# Patient Record
Sex: FEMALE | Race: WHITE | Employment: OTHER | URBAN - METROPOLITAN AREA
[De-identification: names, ages, dates, MRNs, and addresses within clinical notes are randomized per-mention and may not be internally consistent; named-entity substitution may affect disease eponyms.]

---

## 2017-01-03 ENCOUNTER — HOSPITAL ENCOUNTER (OUTPATIENT)
Dept: MAMMOGRAPHY | Age: 77
Discharge: HOME | End: 2017-01-03
Attending: INTERNAL MEDICINE | Admitting: INTERNAL MEDICINE

## 2017-01-03 DIAGNOSIS — R92.8 ABNORMAL MAMMOGRAM, UNSPECIFIED: ICD-10-CM

## 2017-01-03 DIAGNOSIS — Z92.81 PERSONAL HISTORY OF EXTRACORPOREAL MEMBRANE OXYGENATION: ICD-10-CM

## 2017-01-03 DIAGNOSIS — R92.8 ABNORMAL MAMMOGRAM OF LEFT BREAST: ICD-10-CM

## 2017-01-09 RX ORDER — TRAMADOL HYDROCHLORIDE 50 MG/1
TABLET ORAL
Qty: 360 TABLET | Refills: 0 | Status: SHIPPED | OUTPATIENT
Start: 2017-01-09 | End: 2017-04-21 | Stop reason: SDUPTHER

## 2017-02-06 ENCOUNTER — OFFICE VISIT (OUTPATIENT)
Dept: INTERNAL MEDICINE CLINIC | Age: 77
End: 2017-02-06

## 2017-02-06 VITALS — DIASTOLIC BLOOD PRESSURE: 84 MMHG | WEIGHT: 184 LBS | SYSTOLIC BLOOD PRESSURE: 142 MMHG | BODY MASS INDEX: 31.58 KG/M2

## 2017-02-06 DIAGNOSIS — M79.672 PAIN OF LEFT HEEL: Primary | ICD-10-CM

## 2017-02-06 DIAGNOSIS — M77.32 HEEL SPUR, LEFT: ICD-10-CM

## 2017-02-06 DIAGNOSIS — M72.2 PLANTAR FASCIITIS OF LEFT FOOT: ICD-10-CM

## 2017-02-06 PROCEDURE — 99213 OFFICE O/P EST LOW 20 MIN: CPT | Performed by: INTERNAL MEDICINE

## 2017-02-23 ENCOUNTER — PATIENT MESSAGE (OUTPATIENT)
Dept: INTERNAL MEDICINE CLINIC | Age: 77
End: 2017-02-23

## 2017-02-23 DIAGNOSIS — N28.9 RENAL INSUFFICIENCY: ICD-10-CM

## 2017-02-23 DIAGNOSIS — I25.10 CAD IN NATIVE ARTERY: ICD-10-CM

## 2017-02-23 DIAGNOSIS — I10 BENIGN ESSENTIAL HTN: Primary | ICD-10-CM

## 2017-02-23 DIAGNOSIS — E78.5 DYSLIPIDEMIA: ICD-10-CM

## 2017-02-24 ENCOUNTER — HOSPITAL ENCOUNTER (OUTPATIENT)
Dept: OTHER | Age: 77
Discharge: HOME | End: 2017-02-24
Attending: INTERNAL MEDICINE | Admitting: INTERNAL MEDICINE

## 2017-02-24 DIAGNOSIS — N28.9 RENAL INSUFFICIENCY: ICD-10-CM

## 2017-02-24 DIAGNOSIS — I10 BENIGN ESSENTIAL HTN: ICD-10-CM

## 2017-02-24 DIAGNOSIS — E78.5 DYSLIPIDEMIA: ICD-10-CM

## 2017-02-24 DIAGNOSIS — I25.10 CAD IN NATIVE ARTERY: ICD-10-CM

## 2017-02-24 LAB
A/G RATIO: 1.2 (ref 1.1–2.2)
ALBUMIN SERPL-MCNC: 4 G/DL (ref 3.4–5)
ALP BLD-CCNC: 59 U/L (ref 40–129)
ALT SERPL-CCNC: 18 U/L (ref 10–40)
ANION GAP SERPL CALCULATED.3IONS-SCNC: 15 MMOL/L (ref 3–16)
AST SERPL-CCNC: 25 U/L (ref 15–37)
BASOPHILS ABSOLUTE: 0.1 K/UL (ref 0–0.2)
BASOPHILS RELATIVE PERCENT: 1.2 %
BILIRUB SERPL-MCNC: 0.5 MG/DL (ref 0–1)
BUN BLDV-MCNC: 24 MG/DL (ref 7–20)
CALCIUM SERPL-MCNC: 9.8 MG/DL (ref 8.3–10.6)
CHLORIDE BLD-SCNC: 98 MMOL/L (ref 99–110)
CHOLESTEROL, TOTAL: 257 MG/DL (ref 0–199)
CO2: 29 MMOL/L (ref 21–32)
CREAT SERPL-MCNC: 1.1 MG/DL (ref 0.6–1.2)
EOSINOPHILS ABSOLUTE: 0.2 K/UL (ref 0–0.6)
EOSINOPHILS RELATIVE PERCENT: 3 %
GFR AFRICAN AMERICAN: 58
GFR NON-AFRICAN AMERICAN: 48
GLOBULIN: 3.3 G/DL
GLUCOSE BLD-MCNC: 93 MG/DL (ref 70–99)
HCT VFR BLD CALC: 49.3 % (ref 36–48)
HDLC SERPL-MCNC: 50 MG/DL (ref 40–60)
HEMOGLOBIN: 15.9 G/DL (ref 12–16)
LDL CHOLESTEROL CALCULATED: 168 MG/DL
LYMPHOCYTES ABSOLUTE: 1.5 K/UL (ref 1–5.1)
LYMPHOCYTES RELATIVE PERCENT: 25.7 %
MCH RBC QN AUTO: 32 PG (ref 26–34)
MCHC RBC AUTO-ENTMCNC: 32.2 G/DL (ref 31–36)
MCV RBC AUTO: 99.2 FL (ref 80–100)
MONOCYTES ABSOLUTE: 0.4 K/UL (ref 0–1.3)
MONOCYTES RELATIVE PERCENT: 6.6 %
NEUTROPHILS ABSOLUTE: 3.7 K/UL (ref 1.7–7.7)
NEUTROPHILS RELATIVE PERCENT: 63.5 %
PDW BLD-RTO: 14 % (ref 12.4–15.4)
PLATELET # BLD: 136 K/UL (ref 135–450)
PMV BLD AUTO: 10.7 FL (ref 5–10.5)
POTASSIUM SERPL-SCNC: 4.3 MMOL/L (ref 3.5–5.1)
RBC # BLD: 4.97 M/UL (ref 4–5.2)
SODIUM BLD-SCNC: 142 MMOL/L (ref 136–145)
TOTAL PROTEIN: 7.3 G/DL (ref 6.4–8.2)
TRIGL SERPL-MCNC: 197 MG/DL (ref 0–150)
TSH SERPL DL<=0.05 MIU/L-ACNC: 2.22 UIU/ML (ref 0.27–4.2)
VLDLC SERPL CALC-MCNC: 39 MG/DL
WBC # BLD: 5.9 K/UL (ref 4–11)

## 2017-03-02 ENCOUNTER — OFFICE VISIT (OUTPATIENT)
Dept: INTERNAL MEDICINE CLINIC | Age: 77
End: 2017-03-02

## 2017-03-02 VITALS
WEIGHT: 178 LBS | RESPIRATION RATE: 12 BRPM | SYSTOLIC BLOOD PRESSURE: 138 MMHG | HEIGHT: 64 IN | OXYGEN SATURATION: 94 % | HEART RATE: 70 BPM | BODY MASS INDEX: 30.39 KG/M2 | DIASTOLIC BLOOD PRESSURE: 69 MMHG

## 2017-03-02 DIAGNOSIS — E78.5 DYSLIPIDEMIA: ICD-10-CM

## 2017-03-02 DIAGNOSIS — M85.80 OSTEOPENIA: ICD-10-CM

## 2017-03-02 DIAGNOSIS — I10 BENIGN ESSENTIAL HTN: Primary | ICD-10-CM

## 2017-03-02 DIAGNOSIS — Z78.9 STATIN INTOLERANCE: ICD-10-CM

## 2017-03-02 DIAGNOSIS — N18.30 CKD (CHRONIC KIDNEY DISEASE) STAGE 3, GFR 30-59 ML/MIN (HCC): ICD-10-CM

## 2017-03-02 DIAGNOSIS — I25.10 CAD IN NATIVE ARTERY: ICD-10-CM

## 2017-03-02 LAB — PARATHYROID HORMONE INTACT: 39 PG/ML (ref 14–72)

## 2017-03-02 PROCEDURE — 99214 OFFICE O/P EST MOD 30 MIN: CPT | Performed by: INTERNAL MEDICINE

## 2017-03-03 LAB
ALBUMIN SERPL-MCNC: 3.5 G/DL (ref 3.1–4.9)
ALPHA-1-GLOBULIN: 0.3 G/DL (ref 0.2–0.4)
ALPHA-2-GLOBULIN: 0.9 G/DL (ref 0.4–1.1)
BETA GLOBULIN: 1.6 G/DL (ref 0.9–1.6)
GAMMA GLOBULIN: 0.9 G/DL (ref 0.6–1.8)
SPE/IFE INTERPRETATION: NORMAL
TOTAL PROTEIN: 7.1 G/DL (ref 6.4–8.2)

## 2017-03-06 LAB
24HR URINE VOLUME (ML): 1500 ML
PROTEIN 24 HOUR URINE: 0.06 G/24HR

## 2017-03-07 LAB
CREAT SERPL-MCNC: 1.3 MG/DL (ref 0.6–1.2)
CREATININE 24 HOUR URINE: 0.6 G/24HR (ref 0.6–1.5)
CREATININE CLEARANCE: 35 ML/MIN (ref 88–128)
Lab: 24 HOURS
Lab: 24 HR

## 2017-03-08 LAB — URINE ELECTROPHORESIS INTERP: NORMAL

## 2017-03-09 ENCOUNTER — HOSPITAL ENCOUNTER (OUTPATIENT)
Dept: ULTRASOUND IMAGING | Age: 77
Discharge: HOME | End: 2017-03-09
Attending: INTERNAL MEDICINE | Admitting: INTERNAL MEDICINE

## 2017-03-09 DIAGNOSIS — N18.30 CHRONIC KIDNEY DISEASE, STAGE III (MODERATE) (HCC): ICD-10-CM

## 2017-03-09 DIAGNOSIS — N18.30 CKD (CHRONIC KIDNEY DISEASE) STAGE 3, GFR 30-59 ML/MIN (HCC): ICD-10-CM

## 2017-03-10 ENCOUNTER — PATIENT MESSAGE (OUTPATIENT)
Dept: INTERNAL MEDICINE CLINIC | Age: 77
End: 2017-03-10

## 2017-03-10 DIAGNOSIS — N18.30 CKD (CHRONIC KIDNEY DISEASE) STAGE 3, GFR 30-59 ML/MIN (HCC): Primary | ICD-10-CM

## 2017-04-21 RX ORDER — TRAMADOL HYDROCHLORIDE 50 MG/1
TABLET ORAL
Qty: 360 TABLET | Refills: 0 | Status: SHIPPED | OUTPATIENT
Start: 2017-04-21 | End: 2017-07-18 | Stop reason: SDUPTHER

## 2017-06-27 ENCOUNTER — PATIENT MESSAGE (OUTPATIENT)
Dept: INTERNAL MEDICINE CLINIC | Age: 77
End: 2017-06-27

## 2017-06-27 DIAGNOSIS — I25.10 CAD IN NATIVE ARTERY: Primary | ICD-10-CM

## 2017-06-27 DIAGNOSIS — E78.5 DYSLIPIDEMIA: ICD-10-CM

## 2017-06-27 DIAGNOSIS — N18.30 CKD (CHRONIC KIDNEY DISEASE), STAGE III (HCC): ICD-10-CM

## 2017-06-28 ENCOUNTER — HOSPITAL ENCOUNTER (OUTPATIENT)
Dept: OTHER | Age: 77
Discharge: HOME | End: 2017-06-28
Attending: INTERNAL MEDICINE | Admitting: INTERNAL MEDICINE

## 2017-06-28 DIAGNOSIS — N18.30 CKD (CHRONIC KIDNEY DISEASE), STAGE III (HCC): ICD-10-CM

## 2017-06-28 DIAGNOSIS — I25.10 CAD IN NATIVE ARTERY: ICD-10-CM

## 2017-06-28 DIAGNOSIS — E78.5 DYSLIPIDEMIA: ICD-10-CM

## 2017-06-28 LAB
A/G RATIO: 1.3 (ref 1.1–2.2)
ALBUMIN SERPL-MCNC: 4.2 G/DL (ref 3.4–5)
ALP BLD-CCNC: 58 U/L (ref 40–129)
ALT SERPL-CCNC: 19 U/L (ref 10–40)
ANION GAP SERPL CALCULATED.3IONS-SCNC: 11 MMOL/L (ref 3–16)
AST SERPL-CCNC: 25 U/L (ref 15–37)
BASOPHILS ABSOLUTE: 0 K/UL (ref 0–0.2)
BASOPHILS RELATIVE PERCENT: 0.7 %
BILIRUB SERPL-MCNC: 0.5 MG/DL (ref 0–1)
BUN BLDV-MCNC: 24 MG/DL (ref 7–20)
CALCIUM SERPL-MCNC: 10 MG/DL (ref 8.3–10.6)
CHLORIDE BLD-SCNC: 99 MMOL/L (ref 99–110)
CHOLESTEROL, TOTAL: 254 MG/DL (ref 0–199)
CO2: 32 MMOL/L (ref 21–32)
CREAT SERPL-MCNC: 1.1 MG/DL (ref 0.6–1.2)
EOSINOPHILS ABSOLUTE: 0.2 K/UL (ref 0–0.6)
EOSINOPHILS RELATIVE PERCENT: 3.6 %
GFR AFRICAN AMERICAN: 58
GFR NON-AFRICAN AMERICAN: 48
GLOBULIN: 3.3 G/DL
GLUCOSE BLD-MCNC: 87 MG/DL (ref 70–99)
HCT VFR BLD CALC: 49 % (ref 36–48)
HDLC SERPL-MCNC: 56 MG/DL (ref 40–60)
HEMOGLOBIN: 16.4 G/DL (ref 12–16)
LDL CHOLESTEROL CALCULATED: 159 MG/DL
LYMPHOCYTES ABSOLUTE: 1.6 K/UL (ref 1–5.1)
LYMPHOCYTES RELATIVE PERCENT: 32 %
MCH RBC QN AUTO: 33.2 PG (ref 26–34)
MCHC RBC AUTO-ENTMCNC: 33.5 G/DL (ref 31–36)
MCV RBC AUTO: 99.1 FL (ref 80–100)
MONOCYTES ABSOLUTE: 0.4 K/UL (ref 0–1.3)
MONOCYTES RELATIVE PERCENT: 8.2 %
NEUTROPHILS ABSOLUTE: 2.7 K/UL (ref 1.7–7.7)
NEUTROPHILS RELATIVE PERCENT: 55.5 %
PARATHYROID HORMONE INTACT: 45.7 PG/ML (ref 14–72)
PDW BLD-RTO: 13.9 % (ref 12.4–15.4)
PLATELET # BLD: 150 K/UL (ref 135–450)
PMV BLD AUTO: 10.2 FL (ref 5–10.5)
POTASSIUM SERPL-SCNC: 4.7 MMOL/L (ref 3.5–5.1)
RBC # BLD: 4.94 M/UL (ref 4–5.2)
SODIUM BLD-SCNC: 142 MMOL/L (ref 136–145)
TOTAL PROTEIN: 7.5 G/DL (ref 6.4–8.2)
TRIGL SERPL-MCNC: 197 MG/DL (ref 0–150)
VLDLC SERPL CALC-MCNC: 39 MG/DL
WBC # BLD: 4.8 K/UL (ref 4–11)

## 2017-07-03 ENCOUNTER — PATIENT MESSAGE (OUTPATIENT)
Dept: INTERNAL MEDICINE CLINIC | Age: 77
End: 2017-07-03

## 2017-07-03 DIAGNOSIS — R92.8 ABNORMAL MAMMOGRAM: Primary | ICD-10-CM

## 2017-07-13 ENCOUNTER — HOSPITAL ENCOUNTER (OUTPATIENT)
Dept: MAMMOGRAPHY | Age: 77
Discharge: HOME | End: 2017-07-13
Attending: INTERNAL MEDICINE | Admitting: INTERNAL MEDICINE

## 2017-07-13 DIAGNOSIS — R92.8 OTHER ABNORMAL AND INCONCLUSIVE FINDINGS ON DIAGNOSTIC IMAGING OF BREAST: ICD-10-CM

## 2017-07-13 DIAGNOSIS — R92.8 ABNORMAL MAMMOGRAM: ICD-10-CM

## 2017-07-18 RX ORDER — TRAMADOL HYDROCHLORIDE 50 MG/1
TABLET ORAL
Qty: 360 TABLET | Refills: 0 | Status: SHIPPED | OUTPATIENT
Start: 2017-07-18 | End: 2017-10-16 | Stop reason: SDUPTHER

## 2017-08-30 RX ORDER — HYDROCHLOROTHIAZIDE 12.5 MG/1
CAPSULE, GELATIN COATED ORAL
Qty: 90 CAPSULE | Refills: 1 | Status: SHIPPED | OUTPATIENT
Start: 2017-08-30 | End: 2018-03-20 | Stop reason: SDUPTHER

## 2017-08-30 RX ORDER — IRBESARTAN 150 MG/1
TABLET ORAL
Qty: 90 TABLET | Refills: 3 | Status: SHIPPED | OUTPATIENT
Start: 2017-08-30 | End: 2018-09-02 | Stop reason: SDUPTHER

## 2017-08-31 RX ORDER — GABAPENTIN 300 MG/1
CAPSULE ORAL
Qty: 90 CAPSULE | Refills: 0 | Status: SHIPPED | OUTPATIENT
Start: 2017-08-31 | End: 2017-12-06 | Stop reason: SDUPTHER

## 2017-10-02 LAB
24HR URINE VOLUME (ML): 1700 ML
ALBUMIN SERPL-MCNC: 4.1 G/DL (ref 3.4–5)
ANION GAP SERPL CALCULATED.3IONS-SCNC: 15 MMOL/L (ref 3–16)
BASOPHILS ABSOLUTE: 0.1 K/UL (ref 0–0.2)
BASOPHILS RELATIVE PERCENT: 1.1 %
BILIRUBIN URINE: NEGATIVE
BLOOD, URINE: NEGATIVE
BUN BLDV-MCNC: 24 MG/DL (ref 7–20)
CALCIUM SERPL-MCNC: 9.6 MG/DL (ref 8.3–10.6)
CHLORIDE BLD-SCNC: 101 MMOL/L (ref 99–110)
CLARITY: CLEAR
CO2: 28 MMOL/L (ref 21–32)
COLOR: YELLOW
CREAT SERPL-MCNC: 1 MG/DL (ref 0.6–1.2)
CREAT SERPL-MCNC: 1 MG/DL (ref 0.6–1.2)
CREATININE 24 HOUR URINE: 0.8 G/24HR (ref 0.6–1.5)
CREATININE CLEARANCE: 55 ML/MIN (ref 88–128)
CREATININE URINE: 65.1 MG/DL (ref 28–259)
EOSINOPHILS ABSOLUTE: 0.2 K/UL (ref 0–0.6)
EOSINOPHILS RELATIVE PERCENT: 3.3 %
EPITHELIAL CELLS, UA: 0 /HPF (ref 0–5)
GFR AFRICAN AMERICAN: >60
GFR NON-AFRICAN AMERICAN: 54
GLUCOSE BLD-MCNC: 101 MG/DL (ref 70–99)
GLUCOSE URINE: NEGATIVE MG/DL
HCT VFR BLD CALC: 45.4 % (ref 36–48)
HEMOGLOBIN: 15.3 G/DL (ref 12–16)
HYALINE CASTS: 0 /LPF (ref 0–8)
KETONES, URINE: NEGATIVE MG/DL
LEUKOCYTE ESTERASE, URINE: ABNORMAL
LYMPHOCYTES ABSOLUTE: 1.6 K/UL (ref 1–5.1)
LYMPHOCYTES RELATIVE PERCENT: 30.4 %
Lab: 24 HR
MAGNESIUM: 2.2 MG/DL (ref 1.8–2.4)
MCH RBC QN AUTO: 33.3 PG (ref 26–34)
MCHC RBC AUTO-ENTMCNC: 33.6 G/DL (ref 31–36)
MCV RBC AUTO: 99 FL (ref 80–100)
MICROSCOPIC EXAMINATION: YES
MONOCYTES ABSOLUTE: 0.4 K/UL (ref 0–1.3)
MONOCYTES RELATIVE PERCENT: 8 %
NEUTROPHILS ABSOLUTE: 3.1 K/UL (ref 1.7–7.7)
NEUTROPHILS RELATIVE PERCENT: 57.2 %
NITRITE, URINE: NEGATIVE
PARATHYROID HORMONE INTACT: 43.1 PG/ML (ref 14–72)
PDW BLD-RTO: 13.3 % (ref 12.4–15.4)
PH UA: 7
PHOSPHORUS: 3.4 MG/DL (ref 2.5–4.9)
PLATELET # BLD: 148 K/UL (ref 135–450)
PMV BLD AUTO: 10.3 FL (ref 5–10.5)
POTASSIUM SERPL-SCNC: 4.2 MMOL/L (ref 3.5–5.1)
PROTEIN PROTEIN: 7 MG/DL
PROTEIN UA: NEGATIVE MG/DL
RBC # BLD: 4.59 M/UL (ref 4–5.2)
RBC UA: 1 /HPF (ref 0–4)
SODIUM BLD-SCNC: 144 MMOL/L (ref 136–145)
SPECIFIC GRAVITY UA: 1.02
URINE TYPE: ABNORMAL
UROBILINOGEN, URINE: 0.2 E.U./DL
VITAMIN D 25-HYDROXY: 48.4 NG/ML
WBC # BLD: 5.4 K/UL (ref 4–11)
WBC UA: 4 /HPF (ref 0–5)

## 2017-10-06 ENCOUNTER — HOSPITAL ENCOUNTER (OUTPATIENT)
Dept: OTHER | Age: 77
Discharge: HOME | End: 2017-10-06
Attending: INTERNAL MEDICINE | Admitting: INTERNAL MEDICINE

## 2017-10-06 ENCOUNTER — OFFICE VISIT (OUTPATIENT)
Dept: INTERNAL MEDICINE CLINIC | Age: 77
End: 2017-10-06

## 2017-10-06 VITALS
SYSTOLIC BLOOD PRESSURE: 132 MMHG | HEART RATE: 68 BPM | HEIGHT: 64 IN | WEIGHT: 176 LBS | RESPIRATION RATE: 12 BRPM | BODY MASS INDEX: 30.05 KG/M2 | DIASTOLIC BLOOD PRESSURE: 70 MMHG

## 2017-10-06 DIAGNOSIS — R06.09 DOE (DYSPNEA ON EXERTION): ICD-10-CM

## 2017-10-06 DIAGNOSIS — E78.5 DYSLIPIDEMIA: ICD-10-CM

## 2017-10-06 DIAGNOSIS — I10 BENIGN ESSENTIAL HTN: ICD-10-CM

## 2017-10-06 DIAGNOSIS — I25.10 CAD IN NATIVE ARTERY: Primary | ICD-10-CM

## 2017-10-06 DIAGNOSIS — N18.30 CKD (CHRONIC KIDNEY DISEASE), STAGE III (HCC): ICD-10-CM

## 2017-10-06 DIAGNOSIS — Z23 NEED FOR INFLUENZA VACCINATION: ICD-10-CM

## 2017-10-06 DIAGNOSIS — Z78.9 STATIN INTOLERANCE: ICD-10-CM

## 2017-10-06 PROCEDURE — 90662 IIV NO PRSV INCREASED AG IM: CPT | Performed by: INTERNAL MEDICINE

## 2017-10-06 PROCEDURE — 99214 OFFICE O/P EST MOD 30 MIN: CPT | Performed by: INTERNAL MEDICINE

## 2017-10-06 PROCEDURE — G0008 ADMIN INFLUENZA VIRUS VAC: HCPCS | Performed by: INTERNAL MEDICINE

## 2017-10-06 NOTE — MR AVS SNAPSHOT
After Visit Summary             Jazmin Del Castillo   10/6/2017 10:15 AM   Office Visit    Description:  Female : 1940   Provider:  Rossana Pak DO   Department:  [facility]              Your Follow-Up and Future Appointments         Below is a list of your follow-up and future appointments. This may not be a complete list as you may have made appointments directly with providers that we are not aware of or your providers may have made some for you. Please call your providers to confirm appointments. It is important to keep your appointments. Please bring your current insurance card, photo ID, co-pay, and all medication bottles to your appointment. If self-pay, payment is expected at the time of service. Your To-Do List     Follow-Up    Return in about 4 months (around 2018) for check up for chronic medical problems, Medication check. Information from Your Visit        Department     Name Address Phone Fax    [address] [facility] [address] [phone]      You Were Seen for:         Comments    CAD in native artery   [[id number]]         Vital Signs     Blood Pressure Pulse Respirations Height Weight Body Mass Index    132/70 68 12 5' 4\" (1.626 m) 176 lb (79.8 kg) 30.21 kg/m2    Smoking Status                   Never Smoker           Additional Information about your Body Mass Index (BMI)           Your BMI as listed above is considered obese (30 or more). BMI is an estimate of body fat, calculated from your height and weight. The higher your BMI, the greater your risk of heart disease, high blood pressure, type 2 diabetes, stroke, gallstones, arthritis, sleep apnea, and certain cancers. BMI is not perfect. It may overestimate body fat in athletes and people who are more muscular.   Even a small weight loss (between 5 and 10

## 2017-10-06 NOTE — PATIENT INSTRUCTIONS
Chronic Kidney Disease: Care Instructions  Your Care Instructions  Chronic kidney disease happens when your kidneys don't work as well as they should. Your kidneys have a few important jobs. They remove waste from your blood. This waste leaves your body in your urine. They also balance your body's fluids and chemicals. When your kidneys don't work well, extra waste and fluid can build up. This can poison the body and sometimes cause death. The most common causes of this disease are diabetes and high blood pressure. In some cases, the disease develops in 2 to 3 months. But it usually develops over many years. If you take medicine and make healthy changes to your lifestyle, you may be able to prevent the disease from getting worse. But if your kidney damage gets worse, you may need dialysis or a kidney transplant. Dialysis uses a machine to filter waste from the blood. A transplant is surgery to give you a healthy kidney from another person. Follow-up care is a key part of your treatment and safety. Be sure to make and go to all appointments, and call your doctor if you are having problems. It's also a good idea to know your test results and keep a list of the medicines you take. How can you care for yourself at home? Treatments and appointments  · Be safe with medicines. Take your medicines exactly as prescribed. Call your doctor if you have any problems with your medicine. You also may take medicine to control your blood pressure or to treat diabetes. Many people who have diabetes take blood pressure medicine. · If you have diabetes, do your best to keep your blood sugar in your target range. You may do this by eating healthy food and exercising. You may also take medicines. · Go to your dialysis appointments if you have this treatment. · Do not take ibuprofen (Advil, Motrin), naproxen (Aleve), or similar medicines, unless your doctor tells you to. These may make the disease worse.   · Do not take any vitamins, over-the-counter medicines, or herbal products without talking to your doctor first.  · Do not smoke or use other tobacco products. Smoking can reduce blood flow to the kidneys. If you need help quitting, talk to your doctor about stop-smoking programs and medicines. These can increase your chances of quitting for good. · Do not drink alcohol or use illegal drugs. · Talk to your doctor about an exercise plan. Exercise helps lower your blood pressure. It also makes you feel better. · If you have an advance directive, let your doctor know. It may include a living will and a durable power of  for health care. If you don't have one, you may want to prepare one. It lets your doctor and loved ones know your health care wishes if you become unable to speak for yourself. Diet  · Talk to a registered dietitian. He or she can help you make a meal plan that is right for you. Most people with kidney disease need to limit salt (sodium), fluids, and protein. Some also have to limit potassium and phosphorus. · You may have to give up many foods you like. But try to focus on the fact that this will help you stay healthy for as long as possible. · If you have a hard time eating enough, talk to your doctor or dietitian about ways to add calories to your diet. · Your diet may change as your disease changes. See your doctor for regular testing. And work with a dietitian to change your diet as needed. When should you call for help? Call 911 anytime you think you may need emergency care. For example, call if:  · You passed out (lost consciousness). Call your doctor now or seek immediate medical care if:  · You have less urine than normal or no urine. · You have trouble urinating or can urinate only very small amounts. · You are confused or have trouble thinking clearly. · You feel weaker or more tired than usual.  · You are very thirsty, lightheaded, or dizzy. · You have nausea and vomiting.   · You have

## 2017-10-06 NOTE — PROGRESS NOTES
[address] Internal Medicine  Patient Encounter  Dr. Mata Perry    Chief Complaint   Patient presents with   Judy Eckert    Medication Check         HPI-- 69 y.o. female with multiple medical problems seen today for follow up regarding the status of these issues listed below. Overall she has been doing well but reporting some increased shortness of breath with exertion. Coronary Artery Disease--   Patient sees Dr. Duane Lawless, but has not seen for almost three years. Her last stress test was December 8, 2014. Since then she has had no problems with exertional chest pain, palpitations, orthopnea,syncope, dizziness, or increased edema. She does have some SOB with exertion. She remains compliant with her beta blocker, aspirin, ARB. She has lost about 10 pounds. She has plateaued. She has been working on smaller portions. HTN-- patient remains asymptomatic without episodes of unilateral weakness or paresthesias. She specifically denies any new development of headaches, lightheadedness, syncope, swelling, visual disturbances. She is on Avapro 150 mg daily, hydrochlorothiazide 12.5 mg daily, Ziac 106.25 daily. Hyperlipidemia:    Lab Results   Component Value Date    LDLCALC 159 (H) 06/28/2017     Unfortunately, the patient has been intolerant to statin as well as Zetia. She tries to adhere to a low-fat diet but not very strictly. She would like to wait on lab. CKD-- She has seen Dr. Malachi Isabel. She had another 24 hour urine collection. Results reviewed----> CrCl 55. Previously in March CrCl was 35. Protein was OK. She is on HCTZ. The latter seems to be effective for [facility]. But, may need to change. She will follow up on Monday.       Lab Results   Component Value Date    BUN 24 (H) 10/02/2017     Lab Results   Component Value Date    CREATININE 1.0 10/02/2017    CREATININE 1.0 10/02/2017               Past Medical History:   Diagnosis Date    CAD (coronary artery disease)     CKD (chronic kidney disease), stage III     Fibromyalgia     Dr. Bradley Feliz    GERD (gastroesophageal reflux disease)     Headache     Heel spur 08/2016    Hyperlipidemia     Hypertension     IBS (irritable bowel syndrome)     Lyme disease 2009    suspected    MVP (mitral valve prolapse)     Neuroma nos     Osteoarthritis     Osteopenia     Rotator cuff tear     Dr. Susannah Simpson incontinence, female     Urinary incontinence          Review of Systems-- As per HPI     Objective     /70  Pulse 68  Resp 12  Ht 5' 4\" (1.626 m)  Wt 176 lb (79.8 kg)  BMI 30.21 kg/m2  Wt Readings from Last 3 Encounters:   10/06/17 176 lb (79.8 kg)   03/02/17 178 lb (80.7 kg)   02/06/17 184 lb (83.5 kg)     GEN: AND. A&O  HEENT: NC/AT, SWAPNA, EOMI. Oral cavity clear without mucosal lesions. NECK: Supple, No thyromegaly or nodules. No cervical soft tissue masses. LYMPH:  No C/SC nodes  CV: Regular rhythm. Rate normal.   No murmurs, No ectopy  PULM: CTA, no wheezing  VASC: No carotid bruits. Pedal pulses sym. EXT:  No edema  NEURO:  No focal deficits. (-) SLR. SKIN:  No rashes. ASSESSMENT/PLAN:    1. CAD in native artery  Condition stability is uncertain. She's not had a stress test in about 3 years. With worsening shortness of breath with exertion patient should have another stress test.  Refer back to cardiology  Also consider echocardiogram  - Hortensia Darnell MD    2. ZAVALA (dyspnea on exertion)  Etiology is unclear. May be an element of deconditioning rule out anginal equivalent. Hortensia Darnell MD    3. Benign essential HTN  Condition is well controlled  Continue current medications  We'll need to consider stopping the hydrochlorothiazide as part of her regimen due to some renal insufficiency. We'll wait for further recommendations from nephrology    4. Dyslipidemia  Condition is uncontrolled.   She is intolerant to statins and Zetia  Continue aggressive lifestyle modification with low-fat diet along with efforts to increase cardiovascular exercise    5. Statin intolerance      6. CKD (chronic kidney disease), stage III (HCC)  Condition is stable and may be somewhat improved with improved creatinine clearance. Continue to avoid nonsteroidal anti-inflammatories  Continue to stay well hydrated    7.  Need for influenza vaccination    - INFLUENZA, HIGH DOSE, 65 YRS +, IM, PF, PREFILL SYR, 0.5ML (FLUZONE HD)

## 2017-10-16 RX ORDER — TRAMADOL HYDROCHLORIDE 50 MG/1
TABLET ORAL
Qty: 360 TABLET | Refills: 0 | Status: SHIPPED | OUTPATIENT
Start: 2017-10-16 | End: 2018-01-23 | Stop reason: SDUPTHER

## 2017-11-20 ENCOUNTER — OFFICE VISIT (OUTPATIENT)
Dept: INTERNAL MEDICINE CLINIC | Age: 77
End: 2017-11-20

## 2017-11-20 ENCOUNTER — HOSPITAL ENCOUNTER (OUTPATIENT)
Dept: OTHER | Age: 77
Discharge: HOME | End: 2017-11-20
Attending: INTERNAL MEDICINE | Admitting: INTERNAL MEDICINE

## 2017-11-20 VITALS
WEIGHT: 176 LBS | DIASTOLIC BLOOD PRESSURE: 90 MMHG | SYSTOLIC BLOOD PRESSURE: 150 MMHG | OXYGEN SATURATION: 96 % | HEART RATE: 72 BPM | BODY MASS INDEX: 30.21 KG/M2

## 2017-11-20 DIAGNOSIS — J40 BRONCHITIS: Primary | ICD-10-CM

## 2017-11-20 DIAGNOSIS — J40 BRONCHITIS: ICD-10-CM

## 2017-11-20 PROCEDURE — 1036F TOBACCO NON-USER: CPT | Performed by: INTERNAL MEDICINE

## 2017-11-20 PROCEDURE — G8417 CALC BMI ABV UP PARAM F/U: HCPCS | Performed by: INTERNAL MEDICINE

## 2017-11-20 PROCEDURE — G8399 PT W/DXA RESULTS DOCUMENT: HCPCS | Performed by: INTERNAL MEDICINE

## 2017-11-20 PROCEDURE — G8484 FLU IMMUNIZE NO ADMIN: HCPCS | Performed by: INTERNAL MEDICINE

## 2017-11-20 PROCEDURE — 99213 OFFICE O/P EST LOW 20 MIN: CPT | Performed by: INTERNAL MEDICINE

## 2017-11-20 PROCEDURE — G8598 ASA/ANTIPLAT THER USED: HCPCS | Performed by: INTERNAL MEDICINE

## 2017-11-20 PROCEDURE — G8427 DOCREV CUR MEDS BY ELIG CLIN: HCPCS | Performed by: INTERNAL MEDICINE

## 2017-11-20 PROCEDURE — 1123F ACP DISCUSS/DSCN MKR DOCD: CPT | Performed by: INTERNAL MEDICINE

## 2017-11-20 PROCEDURE — 4040F PNEUMOC VAC/ADMIN/RCVD: CPT | Performed by: INTERNAL MEDICINE

## 2017-11-20 PROCEDURE — 1090F PRES/ABSN URINE INCON ASSESS: CPT | Performed by: INTERNAL MEDICINE

## 2017-11-20 RX ORDER — GUAIFENESIN 600 MG/1
600 TABLET, EXTENDED RELEASE ORAL 2 TIMES DAILY
COMMUNITY
Start: 2017-11-20 | End: 2018-02-08 | Stop reason: ALTCHOICE

## 2017-11-20 RX ORDER — METHYLPREDNISOLONE 4 MG/1
4 TABLET ORAL SEE ADMIN INSTRUCTIONS
Qty: 1 KIT | Refills: 0 | Status: SHIPPED | OUTPATIENT
Start: 2017-11-20 | End: 2017-11-26

## 2017-11-20 RX ORDER — AZITHROMYCIN 250 MG/1
TABLET, FILM COATED ORAL
Qty: 1 PACKET | Refills: 0 | Status: SHIPPED | OUTPATIENT
Start: 2017-11-20 | End: 2017-11-30

## 2017-11-20 RX ORDER — ALBUTEROL SULFATE 90 UG/1
2 AEROSOL, METERED RESPIRATORY (INHALATION) EVERY 6 HOURS PRN
Qty: 1 INHALER | Refills: 0 | Status: SHIPPED | OUTPATIENT
Start: 2017-11-20 | End: 2018-05-22 | Stop reason: ALTCHOICE

## 2017-11-20 NOTE — PATIENT INSTRUCTIONS
Patient Education        Bronchitis: Care Instructions  Your Care Instructions    Bronchitis is inflammation of the bronchial tubes, which carry air to the lungs. The tubes swell and produce mucus, or phlegm. The mucus and inflamed bronchial tubes make you cough. You may have trouble breathing. Most cases of bronchitis are caused by viruses like those that cause colds. Antibiotics usually do not help and they may be harmful. Bronchitis usually develops rapidly and lasts about 2 to 3 weeks in otherwise healthy people. Follow-up care is a key part of your treatment and safety. Be sure to make and go to all appointments, and call your doctor if you are having problems. It's also a good idea to know your test results and keep a list of the medicines you take. How can you care for yourself at home? · Take all medicines exactly as prescribed. Call your doctor if you think you are having a problem with your medicine. · Get some extra rest.  · Take an over-the-counter pain medicine, such as acetaminophen (Tylenol), ibuprofen (Advil, Motrin), or naproxen (Aleve) to reduce fever and relieve body aches. Read and follow all instructions on the label. · Do not take two or more pain medicines at the same time unless the doctor told you to. Many pain medicines have acetaminophen, which is Tylenol. Too much acetaminophen (Tylenol) can be harmful. · Take an over-the-counter cough medicine that contains dextromethorphan to help quiet a dry, hacking cough so that you can sleep. Avoid cough medicines that have more than one active ingredient. Read and follow all instructions on the label. · Breathe moist air from a humidifier, hot shower, or sink filled with hot water. The heat and moisture will thin mucus so you can cough it out. · Do not smoke. Smoking can make bronchitis worse. If you need help quitting, talk to your doctor about stop-smoking programs and medicines.  These can increase your chances of quitting for good.  When should you call for help? Call 911 anytime you think you may need emergency care. For example, call if:  · You have severe trouble breathing. Call your doctor now or seek immediate medical care if:  · You have new or worse trouble breathing. · You cough up dark brown or bloody mucus (sputum). · You have a new or higher fever. · You have a new rash. Watch closely for changes in your health, and be sure to contact your doctor if:  · You cough more deeply or more often, especially if you notice more mucus or a change in the color of your mucus. · You are not getting better as expected. Where can you learn more? Go to https://Zentila.fav.or.it. org and sign in to your Accumulate account. Enter H333 in the Wunsch-Brautkleid box to learn more about \"Bronchitis: Care Instructions. \"     If you do not have an account, please click on the \"Sign Up Now\" link. Current as of: March 25, 2017  Content Version: 11.3  © 3304-7411 [employer]. Care instructions adapted under license by [facility] ([facility]). If you have questions about a medical condition or this instruction, always ask your healthcare professional. [place] [address] any warranty or liability for your use of this information.

## 2018-01-23 DIAGNOSIS — M51.36 DDD (DEGENERATIVE DISC DISEASE), LUMBAR: Primary | ICD-10-CM

## 2018-01-23 RX ORDER — TRAMADOL HYDROCHLORIDE 50 MG/1
TABLET ORAL
Qty: 360 TABLET | Refills: 0 | Status: SHIPPED | OUTPATIENT
Start: 2018-01-23 | End: 2018-04-13 | Stop reason: DRUGHIGH

## 2018-02-08 ENCOUNTER — OFFICE VISIT (OUTPATIENT)
Dept: INTERNAL MEDICINE CLINIC | Age: 78
End: 2018-02-08

## 2018-02-08 VITALS
WEIGHT: 177 LBS | HEART RATE: 72 BPM | HEIGHT: 64 IN | RESPIRATION RATE: 16 BRPM | DIASTOLIC BLOOD PRESSURE: 80 MMHG | SYSTOLIC BLOOD PRESSURE: 150 MMHG | BODY MASS INDEX: 30.22 KG/M2

## 2018-02-08 DIAGNOSIS — G89.29 CHRONIC BILATERAL LOW BACK PAIN WITHOUT SCIATICA: ICD-10-CM

## 2018-02-08 DIAGNOSIS — I10 BENIGN ESSENTIAL HTN: Primary | ICD-10-CM

## 2018-02-08 DIAGNOSIS — I25.10 CAD IN NATIVE ARTERY: ICD-10-CM

## 2018-02-08 DIAGNOSIS — M25.551 BILATERAL HIP PAIN: ICD-10-CM

## 2018-02-08 DIAGNOSIS — M43.16 SPONDYLOLISTHESIS OF LUMBAR REGION: ICD-10-CM

## 2018-02-08 DIAGNOSIS — N18.30 CKD (CHRONIC KIDNEY DISEASE), STAGE III (HCC): ICD-10-CM

## 2018-02-08 DIAGNOSIS — M25.552 BILATERAL HIP PAIN: ICD-10-CM

## 2018-02-08 DIAGNOSIS — E78.5 DYSLIPIDEMIA: ICD-10-CM

## 2018-02-08 DIAGNOSIS — M51.36 DDD (DEGENERATIVE DISC DISEASE), LUMBAR: ICD-10-CM

## 2018-02-08 DIAGNOSIS — M54.50 CHRONIC BILATERAL LOW BACK PAIN WITHOUT SCIATICA: ICD-10-CM

## 2018-02-08 LAB
ANION GAP SERPL CALCULATED.3IONS-SCNC: 12 MMOL/L (ref 3–16)
BUN BLDV-MCNC: 26 MG/DL (ref 7–20)
CALCIUM SERPL-MCNC: 10.4 MG/DL (ref 8.3–10.6)
CHLORIDE BLD-SCNC: 97 MMOL/L (ref 99–110)
CHOLESTEROL, TOTAL: 281 MG/DL (ref 0–199)
CO2: 34 MMOL/L (ref 21–32)
CREAT SERPL-MCNC: 1 MG/DL (ref 0.6–1.2)
GFR AFRICAN AMERICAN: >60
GFR NON-AFRICAN AMERICAN: 54
GLUCOSE BLD-MCNC: 89 MG/DL (ref 70–99)
HDLC SERPL-MCNC: 51 MG/DL (ref 40–60)
LDL CHOLESTEROL CALCULATED: ABNORMAL MG/DL
LDL CHOLESTEROL DIRECT: 178 MG/DL
POTASSIUM SERPL-SCNC: 5 MMOL/L (ref 3.5–5.1)
SODIUM BLD-SCNC: 143 MMOL/L (ref 136–145)
TRIGL SERPL-MCNC: 382 MG/DL (ref 0–150)
TSH SERPL DL<=0.05 MIU/L-ACNC: 1.65 UIU/ML (ref 0.27–4.2)
VLDLC SERPL CALC-MCNC: ABNORMAL MG/DL

## 2018-02-08 PROCEDURE — 1123F ACP DISCUSS/DSCN MKR DOCD: CPT | Performed by: INTERNAL MEDICINE

## 2018-02-08 PROCEDURE — 1036F TOBACCO NON-USER: CPT | Performed by: INTERNAL MEDICINE

## 2018-02-08 PROCEDURE — G8417 CALC BMI ABV UP PARAM F/U: HCPCS | Performed by: INTERNAL MEDICINE

## 2018-02-08 PROCEDURE — 99214 OFFICE O/P EST MOD 30 MIN: CPT | Performed by: INTERNAL MEDICINE

## 2018-02-08 PROCEDURE — G8427 DOCREV CUR MEDS BY ELIG CLIN: HCPCS | Performed by: INTERNAL MEDICINE

## 2018-02-08 PROCEDURE — 1090F PRES/ABSN URINE INCON ASSESS: CPT | Performed by: INTERNAL MEDICINE

## 2018-02-08 PROCEDURE — G8598 ASA/ANTIPLAT THER USED: HCPCS | Performed by: INTERNAL MEDICINE

## 2018-02-08 PROCEDURE — G8484 FLU IMMUNIZE NO ADMIN: HCPCS | Performed by: INTERNAL MEDICINE

## 2018-02-08 PROCEDURE — G8399 PT W/DXA RESULTS DOCUMENT: HCPCS | Performed by: INTERNAL MEDICINE

## 2018-02-08 PROCEDURE — 4040F PNEUMOC VAC/ADMIN/RCVD: CPT | Performed by: INTERNAL MEDICINE

## 2018-02-08 ASSESSMENT — PATIENT HEALTH QUESTIONNAIRE - PHQ9
SUM OF ALL RESPONSES TO PHQ QUESTIONS 1-9: 0
2. FEELING DOWN, DEPRESSED OR HOPELESS: 0
SUM OF ALL RESPONSES TO PHQ9 QUESTIONS 1 & 2: 0
1. LITTLE INTEREST OR PLEASURE IN DOING THINGS: 0

## 2018-02-08 NOTE — PATIENT INSTRUCTIONS
muscles. Protect your back when you sit  · Place a small pillow, a rolled-up towel, or a lumbar roll in the curve of your back if you need extra support. · Sit in a chair that is low enough to let you place both feet flat on the floor with both knees nearly level with your hips. If your chair or desk is too high, use a foot rest to raise your knees. · When driving, keep your knees nearly level with your hips. Sit straight, and drive with both hands on the steering wheel. Your arms should be in a slightly bent position. · Try a kneeling chair, which helps tilt your hips forward. This takes pressure off your lower back. · Try sitting on an exercise ball. It can rock from side to side, which helps keep your back loose. Lift properly  · Squat down, bending at the hips and knees only. If you need to, put one knee to the floor and extend your other knee in front of you, bent at a right angle (half kneeling). · Press your chest straight forward. This helps keep your upper back straight while keeping a slight arch in your low back. · Hold the load as close to your body as possible, at the level of your navel. · Use your feet to change direction, taking small steps. · Lead with your hips as you change direction. Keep your shoulders in line with your hips as you move. Do not twist your body. · Set down your load carefully, squatting with your knees and hips only. When should you call for help? Watch closely for changes in your health, and be sure to contact your doctor if you have any problems. Where can you learn more? Go to https://rosa.[employer]. org and sign in to your DealsNear.me account. Enter S810 in the Kluster box to learn more about \"Back Care and Preventing Injuries: Care Instructions. \"     If you do not have an account, please click on the \"Sign Up Now\" link. Current as of: March 21, 2017  Content Version: 11.5  © 4246-9158 [employer].  Care instructions adapted under license by [facility] [address] If you have questions about a medical condition or this instruction, always ask your healthcare professional. [place] [address] any warranty or liability for your use of this information. Patient Education        Back Stretches: Exercises  Your Care Instructions  Here are some examples of exercises for stretching your back. Start each exercise slowly. Ease off the exercise if you start to have pain. Your doctor or physical therapist will tell you when you can start these exercises and which ones will work best for you. How to do the exercises  Overhead stretch    1. Stand comfortably with your feet shoulder-width apart. 2. Looking straight ahead, raise both arms over your head and reach toward the ceiling. Do not allow your head to tilt back. 3. Hold for 15 to 30 seconds, then lower your arms to your sides. 4. Repeat 2 to 4 times. Side stretch    1. Stand comfortably with your feet shoulder-width apart. 2. Raise one arm over your head, and then lean to the other side. 3. Slide your hand down your leg as you let the weight of your arm gently stretch your side muscles. Hold for 15 to 30 seconds. 4. Repeat 2 to 4 times on each side. Press-up    1. Lie on your stomach, supporting your body with your forearms. 2. Press your elbows down into the floor to raise your upper back. As you do this, relax your stomach muscles and allow your back to arch without using your back muscles. As your press up, do not let your hips or pelvis come off the floor. 3. Hold for 15 to 30 seconds, then relax. 4. Repeat 2 to 4 times. Relax and rest    1. Lie on your back with a rolled towel under your neck and a pillow under your knees. Extend your arms comfortably to your sides. 2. Relax and breathe normally. 3. Remain in this position for about 10 minutes. 4. If you can, do this 2 or 3 times each day. Follow-up care is a key part of your treatment and safety.  Be servings of grains each day. A serving is 1 slice of bread, 1 ounce of dry cereal, or ½ cup of cooked rice, pasta, or cooked cereal. Try to choose whole-grain products as much as possible. · Limit lean meat, poultry, and fish to 2 servings each day. A serving is 3 ounces, about the size of a deck of cards. · Eat 4 to 5 servings of nuts, seeds, and legumes (cooked dried beans, lentils, and split peas) each week. A serving is 1/3 cup of nuts, 2 tablespoons of seeds, or ½ cup of cooked beans or peas. · Limit fats and oils to 2 to 3 servings each day. A serving is 1 teaspoon of vegetable oil or 2 tablespoons of salad dressing. · Limit sweets and added sugars to 5 servings or less a week. A serving is 1 tablespoon jelly or jam, ½ cup sorbet, or 1 cup of lemonade. · Eat less than 2,300 milligrams (mg) of sodium a day. If you limit your sodium to 1,500 mg a day, you can lower your blood pressure even more. Tips for success  · Start small. Do not try to make dramatic changes to your diet all at once. You might feel that you are missing out on your favorite foods and then be more likely to not follow the plan. Make small changes, and stick with them. Once those changes become habit, add a few more changes. · Try some of the following:  ¨ Make it a goal to eat a fruit or vegetable at every meal and at snacks. This will make it easy to get the recommended amount of fruits and vegetables each day. ¨ Try yogurt topped with fruit and nuts for a snack or healthy dessert. ¨ Add lettuce, tomato, cucumber, and onion to sandwiches. ¨ Combine a ready-made pizza crust with low-fat mozzarella cheese and lots of vegetable toppings. Try using tomatoes, squash, spinach, broccoli, carrots, cauliflower, and onions. ¨ Have a variety of cut-up vegetables with a low-fat dip as an appetizer instead of chips and dip. ¨ Sprinkle sunflower seeds or chopped almonds over salads.  Or try adding chopped walnuts or almonds to cooked

## 2018-02-13 ENCOUNTER — HOSPITAL ENCOUNTER (OUTPATIENT)
Dept: OTHER | Age: 78
Discharge: HOME | End: 2018-02-13
Attending: INTERNAL MEDICINE | Admitting: INTERNAL MEDICINE

## 2018-02-13 DIAGNOSIS — M25.552 LEFT HIP PAIN: ICD-10-CM

## 2018-02-13 DIAGNOSIS — M25.551 RIGHT HIP PAIN: ICD-10-CM

## 2018-03-05 RX ORDER — GABAPENTIN 300 MG/1
CAPSULE ORAL
Qty: 90 CAPSULE | Refills: 0 | Status: SHIPPED | OUTPATIENT
Start: 2018-03-05 | End: 2018-05-31 | Stop reason: SDUPTHER

## 2018-04-13 ENCOUNTER — OFFICE VISIT (OUTPATIENT)
Dept: INTERNAL MEDICINE CLINIC | Age: 78
End: 2018-04-13

## 2018-04-13 VITALS — SYSTOLIC BLOOD PRESSURE: 120 MMHG | HEART RATE: 68 BPM | DIASTOLIC BLOOD PRESSURE: 86 MMHG

## 2018-04-13 DIAGNOSIS — Z98.1 HISTORY OF LUMBAR FUSION: ICD-10-CM

## 2018-04-13 DIAGNOSIS — M51.36 DDD (DEGENERATIVE DISC DISEASE), LUMBAR: ICD-10-CM

## 2018-04-13 DIAGNOSIS — M54.50 CHRONIC BILATERAL LOW BACK PAIN WITHOUT SCIATICA: Primary | ICD-10-CM

## 2018-04-13 DIAGNOSIS — G89.29 CHRONIC BILATERAL LOW BACK PAIN WITHOUT SCIATICA: Primary | ICD-10-CM

## 2018-04-13 DIAGNOSIS — M16.0 PRIMARY OSTEOARTHRITIS OF BOTH HIPS: ICD-10-CM

## 2018-04-13 PROCEDURE — G8399 PT W/DXA RESULTS DOCUMENT: HCPCS | Performed by: INTERNAL MEDICINE

## 2018-04-13 PROCEDURE — G8598 ASA/ANTIPLAT THER USED: HCPCS | Performed by: INTERNAL MEDICINE

## 2018-04-13 PROCEDURE — 1036F TOBACCO NON-USER: CPT | Performed by: INTERNAL MEDICINE

## 2018-04-13 PROCEDURE — 99213 OFFICE O/P EST LOW 20 MIN: CPT | Performed by: INTERNAL MEDICINE

## 2018-04-13 PROCEDURE — 4040F PNEUMOC VAC/ADMIN/RCVD: CPT | Performed by: INTERNAL MEDICINE

## 2018-04-13 PROCEDURE — G8427 DOCREV CUR MEDS BY ELIG CLIN: HCPCS | Performed by: INTERNAL MEDICINE

## 2018-04-13 PROCEDURE — G8417 CALC BMI ABV UP PARAM F/U: HCPCS | Performed by: INTERNAL MEDICINE

## 2018-04-13 PROCEDURE — 1090F PRES/ABSN URINE INCON ASSESS: CPT | Performed by: INTERNAL MEDICINE

## 2018-04-13 PROCEDURE — 1123F ACP DISCUSS/DSCN MKR DOCD: CPT | Performed by: INTERNAL MEDICINE

## 2018-04-13 RX ORDER — METHYLPREDNISOLONE 4 MG/1
4 TABLET ORAL SEE ADMIN INSTRUCTIONS
Qty: 1 KIT | Refills: 1 | Status: SHIPPED | OUTPATIENT
Start: 2018-04-13 | End: 2018-04-19

## 2018-04-13 RX ORDER — AMOXICILLIN 500 MG/1
CAPSULE ORAL
Refills: 0 | COMMUNITY
Start: 2018-04-03 | End: 2018-10-18

## 2018-04-13 RX ORDER — TRAMADOL HYDROCHLORIDE 50 MG/1
TABLET ORAL
Qty: 360 TABLET | Refills: 0 | Status: SHIPPED | OUTPATIENT
Start: 2018-04-13 | End: 2018-05-08 | Stop reason: CLARIF

## 2018-04-20 ENCOUNTER — HOSPITAL ENCOUNTER (OUTPATIENT)
Dept: MRI IMAGING | Age: 78
Discharge: HOME | End: 2018-04-20
Attending: INTERNAL MEDICINE | Admitting: INTERNAL MEDICINE

## 2018-04-20 DIAGNOSIS — M54.50 CHRONIC BILATERAL LOW BACK PAIN WITHOUT SCIATICA: ICD-10-CM

## 2018-04-20 DIAGNOSIS — G89.29 CHRONIC BILATERAL LOW BACK PAIN WITHOUT SCIATICA: ICD-10-CM

## 2018-04-20 DIAGNOSIS — Z98.1 HISTORY OF LUMBAR FUSION: ICD-10-CM

## 2018-04-20 DIAGNOSIS — M51.36 DDD (DEGENERATIVE DISC DISEASE), LUMBAR: ICD-10-CM

## 2018-04-20 DIAGNOSIS — M54.50 LOW BACK PAIN: ICD-10-CM

## 2018-04-23 ENCOUNTER — PATIENT MESSAGE (OUTPATIENT)
Dept: INTERNAL MEDICINE CLINIC | Age: 78
End: 2018-04-23

## 2018-04-23 DIAGNOSIS — M47.816 LUMBAR FACET ARTHROPATHY: ICD-10-CM

## 2018-04-23 DIAGNOSIS — M48.061 LUMBAR FORAMINAL STENOSIS: ICD-10-CM

## 2018-04-23 DIAGNOSIS — M54.50 CHRONIC BILATERAL LOW BACK PAIN WITHOUT SCIATICA: Primary | ICD-10-CM

## 2018-04-23 DIAGNOSIS — Z98.1 S/P LUMBAR FUSION: ICD-10-CM

## 2018-04-23 DIAGNOSIS — G89.29 CHRONIC BILATERAL LOW BACK PAIN WITHOUT SCIATICA: Primary | ICD-10-CM

## 2018-04-23 DIAGNOSIS — M51.36 DDD (DEGENERATIVE DISC DISEASE), LUMBAR: ICD-10-CM

## 2018-05-06 ENCOUNTER — PATIENT MESSAGE (OUTPATIENT)
Dept: INTERNAL MEDICINE CLINIC | Age: 78
End: 2018-05-06

## 2018-05-06 DIAGNOSIS — I25.10 CAD IN NATIVE ARTERY: Primary | ICD-10-CM

## 2018-05-06 DIAGNOSIS — N18.30 CKD (CHRONIC KIDNEY DISEASE), STAGE III (HCC): ICD-10-CM

## 2018-05-06 DIAGNOSIS — I10 BENIGN ESSENTIAL HTN: ICD-10-CM

## 2018-05-06 DIAGNOSIS — E78.5 DYSLIPIDEMIA: ICD-10-CM

## 2018-05-08 ENCOUNTER — TELEPHONE (OUTPATIENT)
Dept: INTERNAL MEDICINE CLINIC | Age: 78
End: 2018-05-08

## 2018-05-08 ENCOUNTER — HOSPITAL ENCOUNTER (OUTPATIENT)
Dept: OTHER | Age: 78
Discharge: HOME | End: 2018-05-08
Attending: INTERNAL MEDICINE | Admitting: INTERNAL MEDICINE

## 2018-05-08 DIAGNOSIS — E78.5 DYSLIPIDEMIA: ICD-10-CM

## 2018-05-08 DIAGNOSIS — I25.10 CAD IN NATIVE ARTERY: ICD-10-CM

## 2018-05-08 DIAGNOSIS — N18.30 CKD (CHRONIC KIDNEY DISEASE), STAGE III (HCC): ICD-10-CM

## 2018-05-08 DIAGNOSIS — I10 BENIGN ESSENTIAL HTN: ICD-10-CM

## 2018-05-08 LAB
A/G RATIO: 1.4 (ref 1.1–2.2)
ALBUMIN SERPL-MCNC: 4.1 G/DL (ref 3.4–5)
ALP BLD-CCNC: 64 U/L (ref 40–129)
ALT SERPL-CCNC: 20 U/L (ref 10–40)
ANION GAP SERPL CALCULATED.3IONS-SCNC: 16 MMOL/L (ref 3–16)
AST SERPL-CCNC: 30 U/L (ref 15–37)
BASOPHILS ABSOLUTE: 0.1 K/UL (ref 0–0.2)
BASOPHILS RELATIVE PERCENT: 2.3 %
BILIRUB SERPL-MCNC: 0.4 MG/DL (ref 0–1)
BUN BLDV-MCNC: 20 MG/DL (ref 7–20)
CALCIUM SERPL-MCNC: 9.8 MG/DL (ref 8.3–10.6)
CHLORIDE BLD-SCNC: 100 MMOL/L (ref 99–110)
CHOLESTEROL, TOTAL: 278 MG/DL (ref 0–199)
CO2: 30 MMOL/L (ref 21–32)
CREAT SERPL-MCNC: 1 MG/DL (ref 0.6–1.2)
EOSINOPHILS ABSOLUTE: 0.2 K/UL (ref 0–0.6)
EOSINOPHILS RELATIVE PERCENT: 4.6 %
GFR AFRICAN AMERICAN: >60
GFR NON-AFRICAN AMERICAN: 54
GLOBULIN: 3 G/DL
GLUCOSE BLD-MCNC: 93 MG/DL (ref 70–99)
HCT VFR BLD CALC: 45.8 % (ref 36–48)
HDLC SERPL-MCNC: 54 MG/DL (ref 40–60)
HEMOGLOBIN: 15.6 G/DL (ref 12–16)
LDL CHOLESTEROL CALCULATED: 173 MG/DL
LYMPHOCYTES ABSOLUTE: 1.5 K/UL (ref 1–5.1)
LYMPHOCYTES RELATIVE PERCENT: 34.1 %
MCH RBC QN AUTO: 33.3 PG (ref 26–34)
MCHC RBC AUTO-ENTMCNC: 34 G/DL (ref 31–36)
MCV RBC AUTO: 97.8 FL (ref 80–100)
MONOCYTES ABSOLUTE: 0.3 K/UL (ref 0–1.3)
MONOCYTES RELATIVE PERCENT: 6.9 %
NEUTROPHILS ABSOLUTE: 2.4 K/UL (ref 1.7–7.7)
NEUTROPHILS RELATIVE PERCENT: 52.1 %
PDW BLD-RTO: 14 % (ref 12.4–15.4)
PLATELET # BLD: 171 K/UL (ref 135–450)
PMV BLD AUTO: 9.9 FL (ref 5–10.5)
POTASSIUM SERPL-SCNC: 4.1 MMOL/L (ref 3.5–5.1)
RBC # BLD: 4.68 M/UL (ref 4–5.2)
SODIUM BLD-SCNC: 146 MMOL/L (ref 136–145)
TOTAL PROTEIN: 7.1 G/DL (ref 6.4–8.2)
TRIGL SERPL-MCNC: 256 MG/DL (ref 0–150)
VLDLC SERPL CALC-MCNC: 51 MG/DL
WBC # BLD: 4.5 K/UL (ref 4–11)

## 2018-05-10 ENCOUNTER — TELEPHONE (OUTPATIENT)
Dept: DERMATOLOGY | Age: 78
End: 2018-05-10

## 2018-05-10 ENCOUNTER — OFFICE VISIT (OUTPATIENT)
Dept: INTERNAL MEDICINE CLINIC | Age: 78
End: 2018-05-10

## 2018-05-10 VITALS
BODY MASS INDEX: 31 KG/M2 | HEART RATE: 64 BPM | DIASTOLIC BLOOD PRESSURE: 80 MMHG | SYSTOLIC BLOOD PRESSURE: 156 MMHG | RESPIRATION RATE: 14 BRPM | WEIGHT: 180.6 LBS

## 2018-05-10 DIAGNOSIS — M51.36 DDD (DEGENERATIVE DISC DISEASE), LUMBAR: ICD-10-CM

## 2018-05-10 DIAGNOSIS — I25.10 CAD IN NATIVE ARTERY: ICD-10-CM

## 2018-05-10 DIAGNOSIS — L98.9 SKIN LESION OF HAND: ICD-10-CM

## 2018-05-10 DIAGNOSIS — E78.5 DYSLIPIDEMIA: ICD-10-CM

## 2018-05-10 DIAGNOSIS — Z23 NEED FOR SHINGLES VACCINE: ICD-10-CM

## 2018-05-10 DIAGNOSIS — M54.50 CHRONIC BILATERAL LOW BACK PAIN WITHOUT SCIATICA: ICD-10-CM

## 2018-05-10 DIAGNOSIS — Z78.9 STATIN INTOLERANCE: ICD-10-CM

## 2018-05-10 DIAGNOSIS — G89.29 CHRONIC BILATERAL LOW BACK PAIN WITHOUT SCIATICA: ICD-10-CM

## 2018-05-10 DIAGNOSIS — M48.061 SPINAL STENOSIS OF LUMBAR REGION WITHOUT NEUROGENIC CLAUDICATION: ICD-10-CM

## 2018-05-10 DIAGNOSIS — I10 BENIGN ESSENTIAL HTN: Primary | ICD-10-CM

## 2018-05-10 DIAGNOSIS — L85.8 KERATOACANTHOMA: ICD-10-CM

## 2018-05-10 PROCEDURE — 99214 OFFICE O/P EST MOD 30 MIN: CPT | Performed by: INTERNAL MEDICINE

## 2018-05-10 PROCEDURE — 1090F PRES/ABSN URINE INCON ASSESS: CPT | Performed by: INTERNAL MEDICINE

## 2018-05-10 PROCEDURE — G8427 DOCREV CUR MEDS BY ELIG CLIN: HCPCS | Performed by: INTERNAL MEDICINE

## 2018-05-10 PROCEDURE — 1036F TOBACCO NON-USER: CPT | Performed by: INTERNAL MEDICINE

## 2018-05-10 PROCEDURE — G8598 ASA/ANTIPLAT THER USED: HCPCS | Performed by: INTERNAL MEDICINE

## 2018-05-10 PROCEDURE — 4040F PNEUMOC VAC/ADMIN/RCVD: CPT | Performed by: INTERNAL MEDICINE

## 2018-05-10 PROCEDURE — G8417 CALC BMI ABV UP PARAM F/U: HCPCS | Performed by: INTERNAL MEDICINE

## 2018-05-10 PROCEDURE — 1123F ACP DISCUSS/DSCN MKR DOCD: CPT | Performed by: INTERNAL MEDICINE

## 2018-05-10 PROCEDURE — G8399 PT W/DXA RESULTS DOCUMENT: HCPCS | Performed by: INTERNAL MEDICINE

## 2018-05-10 RX ORDER — TRAMADOL HYDROCHLORIDE 50 MG/1
TABLET ORAL
Qty: 360 TABLET | Refills: 0 | Status: SHIPPED | OUTPATIENT
Start: 2018-05-10 | End: 2018-07-16 | Stop reason: SDUPTHER

## 2018-05-11 ENCOUNTER — OFFICE VISIT (OUTPATIENT)
Dept: ORTHOPEDIC SURGERY | Age: 78
End: 2018-05-11

## 2018-05-11 VITALS
HEIGHT: 64 IN | SYSTOLIC BLOOD PRESSURE: 147 MMHG | DIASTOLIC BLOOD PRESSURE: 84 MMHG | WEIGHT: 180.56 LBS | BODY MASS INDEX: 30.83 KG/M2 | HEART RATE: 68 BPM

## 2018-05-11 DIAGNOSIS — M51.36 DDD (DEGENERATIVE DISC DISEASE), LUMBAR: ICD-10-CM

## 2018-05-11 DIAGNOSIS — M96.1 LUMBAR POST-LAMINECTOMY SYNDROME: Primary | ICD-10-CM

## 2018-05-11 DIAGNOSIS — M48.061 SPINAL STENOSIS OF LUMBAR REGION WITHOUT NEUROGENIC CLAUDICATION: ICD-10-CM

## 2018-05-11 DIAGNOSIS — M47.816 SPONDYLOSIS OF LUMBAR REGION WITHOUT MYELOPATHY OR RADICULOPATHY: ICD-10-CM

## 2018-05-11 PROCEDURE — G8427 DOCREV CUR MEDS BY ELIG CLIN: HCPCS | Performed by: PHYSICAL MEDICINE & REHABILITATION

## 2018-05-11 PROCEDURE — 99204 OFFICE O/P NEW MOD 45 MIN: CPT | Performed by: PHYSICAL MEDICINE & REHABILITATION

## 2018-05-11 PROCEDURE — 1090F PRES/ABSN URINE INCON ASSESS: CPT | Performed by: PHYSICAL MEDICINE & REHABILITATION

## 2018-05-11 PROCEDURE — G8417 CALC BMI ABV UP PARAM F/U: HCPCS | Performed by: PHYSICAL MEDICINE & REHABILITATION

## 2018-05-11 RX ORDER — TRAMADOL HYDROCHLORIDE 50 MG/1
50 TABLET ORAL
Qty: 360 TABLET | Refills: 0 | OUTPATIENT
Start: 2018-05-11 | End: 2018-05-21

## 2018-05-14 ENCOUNTER — TELEPHONE (OUTPATIENT)
Dept: ORTHOPEDIC SURGERY | Age: 78
End: 2018-05-14

## 2018-05-14 ENCOUNTER — TELEPHONE (OUTPATIENT)
Dept: DERMATOLOGY | Age: 78
End: 2018-05-14

## 2018-05-14 ENCOUNTER — TELEPHONE (OUTPATIENT)
Dept: INTERNAL MEDICINE CLINIC | Age: 78
End: 2018-05-14

## 2018-05-15 ENCOUNTER — OFFICE VISIT (OUTPATIENT)
Dept: DERMATOLOGY | Age: 78
End: 2018-05-15

## 2018-05-15 DIAGNOSIS — D48.5 NEOPLASM OF UNCERTAIN BEHAVIOR OF SKIN: Primary | ICD-10-CM

## 2018-05-15 PROCEDURE — 11100 PR BIOPSY OF SKIN LESION: CPT | Performed by: DERMATOLOGY

## 2018-05-18 ENCOUNTER — PAT TELEPHONE (OUTPATIENT)
Dept: PREADMISSION TESTING | Age: 78
End: 2018-05-18

## 2018-05-18 ENCOUNTER — TELEPHONE (OUTPATIENT)
Dept: DERMATOLOGY | Age: 78
End: 2018-05-18

## 2018-05-18 DIAGNOSIS — C44.622 SQUAMOUS CELL CARCINOMA OF DORSUM OF RIGHT HAND: Primary | ICD-10-CM

## 2018-05-22 ENCOUNTER — HOSPITAL ENCOUNTER (OUTPATIENT)
Dept: PAIN MANAGEMENT | Age: 78
Discharge: HOME | End: 2018-05-22
Attending: PHYSICAL MEDICINE & REHABILITATION | Admitting: PHYSICAL MEDICINE & REHABILITATION

## 2018-05-22 VITALS
DIASTOLIC BLOOD PRESSURE: 78 MMHG | RESPIRATION RATE: 15 BRPM | SYSTOLIC BLOOD PRESSURE: 146 MMHG | HEART RATE: 53 BPM | OXYGEN SATURATION: 96 % | HEIGHT: 64 IN | WEIGHT: 176 LBS | BODY MASS INDEX: 30.05 KG/M2

## 2018-05-22 ASSESSMENT — PAIN DESCRIPTION - DESCRIPTORS: DESCRIPTORS: ACHING

## 2018-05-22 ASSESSMENT — PAIN - FUNCTIONAL ASSESSMENT
PAIN_FUNCTIONAL_ASSESSMENT: 0-10
PAIN_FUNCTIONAL_ASSESSMENT: 0-10

## 2018-05-22 ASSESSMENT — ACTIVITIES OF DAILY LIVING (ADL): EFFECT OF PAIN ON DAILY ACTIVITIES: STANDING, WALKING

## 2018-05-24 ENCOUNTER — PROCEDURE VISIT (OUTPATIENT)
Dept: SURGERY | Age: 78
End: 2018-05-24

## 2018-05-24 VITALS
SYSTOLIC BLOOD PRESSURE: 138 MMHG | TEMPERATURE: 97.3 F | WEIGHT: 178 LBS | HEART RATE: 57 BPM | BODY MASS INDEX: 30.55 KG/M2 | DIASTOLIC BLOOD PRESSURE: 77 MMHG | OXYGEN SATURATION: 91 %

## 2018-05-24 DIAGNOSIS — C44.622 SQUAMOUS CELL CARCINOMA OF RIGHT HAND: Primary | ICD-10-CM

## 2018-05-24 PROCEDURE — 17311 MOHS 1 STAGE H/N/HF/G: CPT | Performed by: DERMATOLOGY

## 2018-05-24 PROCEDURE — 12042 INTMD RPR N-HF/GENIT2.6-7.5: CPT | Performed by: DERMATOLOGY

## 2018-05-25 ENCOUNTER — TELEPHONE (OUTPATIENT)
Dept: SURGERY | Age: 78
End: 2018-05-25

## 2018-06-01 RX ORDER — GABAPENTIN 300 MG/1
CAPSULE ORAL
Qty: 90 CAPSULE | Refills: 0 | Status: SHIPPED | OUTPATIENT
Start: 2018-06-01 | End: 2018-08-27 | Stop reason: SDUPTHER

## 2018-06-07 ENCOUNTER — NURSE ONLY (OUTPATIENT)
Dept: SURGERY | Age: 78
End: 2018-06-07

## 2018-06-07 DIAGNOSIS — Z48.02 VISIT FOR SUTURE REMOVAL: Primary | ICD-10-CM

## 2018-07-16 DIAGNOSIS — G89.29 CHRONIC BILATERAL LOW BACK PAIN WITHOUT SCIATICA: ICD-10-CM

## 2018-07-16 DIAGNOSIS — M51.36 DDD (DEGENERATIVE DISC DISEASE), LUMBAR: ICD-10-CM

## 2018-07-16 DIAGNOSIS — M48.061 SPINAL STENOSIS OF LUMBAR REGION WITHOUT NEUROGENIC CLAUDICATION: ICD-10-CM

## 2018-07-16 DIAGNOSIS — M54.50 CHRONIC BILATERAL LOW BACK PAIN WITHOUT SCIATICA: ICD-10-CM

## 2018-07-16 RX ORDER — TRAMADOL HYDROCHLORIDE 50 MG/1
TABLET ORAL
Qty: 360 TABLET | Refills: 0 | Status: SHIPPED | OUTPATIENT
Start: 2018-07-16 | End: 2018-11-01 | Stop reason: SDUPTHER

## 2018-07-16 NOTE — TELEPHONE ENCOUNTER
From: Alise Burciaga  Sent: 7/16/2018 1:11 PM EDT  Subject: Medication Renewal Request    Alise Burciaga would like a refill of the following medications:     traMADol (ULTRAM) 50 MG tablet [Hill James, DO]   Patient Comment: Previously sent order from Dr. Sangeeta Hernandez is now past 30 days. [facility] needs a new prescription.     Preferred pharmacy: [address] [place] [address] , [address] [facility] [phone] - F [phone]

## 2018-08-09 ENCOUNTER — PATIENT MESSAGE (OUTPATIENT)
Dept: INTERNAL MEDICINE CLINIC | Age: 78
End: 2018-08-09

## 2018-08-09 DIAGNOSIS — I25.10 CAD IN NATIVE ARTERY: Primary | ICD-10-CM

## 2018-08-09 DIAGNOSIS — E78.5 DYSLIPIDEMIA: ICD-10-CM

## 2018-08-09 DIAGNOSIS — I10 BENIGN ESSENTIAL HTN: ICD-10-CM

## 2018-08-09 NOTE — TELEPHONE ENCOUNTER
From: Chaitanya Mcgraw  To: Chelle Mendez DO  Sent: 8/9/2018 1:19 PM EDT  Subject: Non-Urgent Medical Question    Do I need blood work this time for my appointment on the 15th?     Radha Love

## 2018-08-10 ENCOUNTER — HOSPITAL ENCOUNTER (OUTPATIENT)
Dept: OTHER | Age: 78
Discharge: HOME | End: 2018-08-10
Attending: INTERNAL MEDICINE | Admitting: INTERNAL MEDICINE

## 2018-08-10 DIAGNOSIS — E78.5 DYSLIPIDEMIA: ICD-10-CM

## 2018-08-10 DIAGNOSIS — I25.10 CAD IN NATIVE ARTERY: ICD-10-CM

## 2018-08-10 DIAGNOSIS — I10 BENIGN ESSENTIAL HTN: ICD-10-CM

## 2018-08-10 LAB
A/G RATIO: 1.3 (ref 1.1–2.2)
ALBUMIN SERPL-MCNC: 4.1 G/DL (ref 3.4–5)
ALP BLD-CCNC: 64 U/L (ref 40–129)
ALT SERPL-CCNC: 20 U/L (ref 10–40)
ANION GAP SERPL CALCULATED.3IONS-SCNC: 12 MMOL/L (ref 3–16)
AST SERPL-CCNC: 27 U/L (ref 15–37)
BILIRUB SERPL-MCNC: 0.4 MG/DL (ref 0–1)
BUN BLDV-MCNC: 28 MG/DL (ref 7–20)
CALCIUM SERPL-MCNC: 9.9 MG/DL (ref 8.3–10.6)
CHLORIDE BLD-SCNC: 99 MMOL/L (ref 99–110)
CHOLESTEROL, TOTAL: 260 MG/DL (ref 0–199)
CO2: 32 MMOL/L (ref 21–32)
CREAT SERPL-MCNC: 1.2 MG/DL (ref 0.6–1.2)
GFR AFRICAN AMERICAN: 53
GFR NON-AFRICAN AMERICAN: 44
GLOBULIN: 3.1 G/DL
GLUCOSE BLD-MCNC: 92 MG/DL (ref 70–99)
HDLC SERPL-MCNC: 49 MG/DL (ref 40–60)
LDL CHOLESTEROL CALCULATED: 175 MG/DL
POTASSIUM SERPL-SCNC: 4.5 MMOL/L (ref 3.5–5.1)
SODIUM BLD-SCNC: 143 MMOL/L (ref 136–145)
TOTAL PROTEIN: 7.2 G/DL (ref 6.4–8.2)
TRIGL SERPL-MCNC: 178 MG/DL (ref 0–150)
VLDLC SERPL CALC-MCNC: 36 MG/DL

## 2018-08-15 ENCOUNTER — OFFICE VISIT (OUTPATIENT)
Dept: INTERNAL MEDICINE CLINIC | Age: 78
End: 2018-08-15

## 2018-08-15 VITALS
RESPIRATION RATE: 12 BRPM | DIASTOLIC BLOOD PRESSURE: 70 MMHG | HEART RATE: 70 BPM | WEIGHT: 183 LBS | SYSTOLIC BLOOD PRESSURE: 126 MMHG | BODY MASS INDEX: 32.43 KG/M2 | HEIGHT: 63 IN

## 2018-08-15 DIAGNOSIS — I10 BENIGN ESSENTIAL HTN: ICD-10-CM

## 2018-08-15 DIAGNOSIS — M54.50 CHRONIC BILATERAL LOW BACK PAIN WITHOUT SCIATICA: ICD-10-CM

## 2018-08-15 DIAGNOSIS — M48.061 SPINAL STENOSIS OF LUMBAR REGION WITHOUT NEUROGENIC CLAUDICATION: ICD-10-CM

## 2018-08-15 DIAGNOSIS — N18.30 CKD (CHRONIC KIDNEY DISEASE), STAGE III (HCC): ICD-10-CM

## 2018-08-15 DIAGNOSIS — G89.29 CHRONIC BILATERAL LOW BACK PAIN WITHOUT SCIATICA: ICD-10-CM

## 2018-08-15 DIAGNOSIS — I25.10 CAD IN NATIVE ARTERY: Primary | ICD-10-CM

## 2018-08-15 DIAGNOSIS — E78.5 DYSLIPIDEMIA: ICD-10-CM

## 2018-08-15 DIAGNOSIS — C44.622 SQUAMOUS CELL CANCER OF SKIN OF RIGHT HAND: ICD-10-CM

## 2018-08-15 PROCEDURE — 1101F PT FALLS ASSESS-DOCD LE1/YR: CPT | Performed by: INTERNAL MEDICINE

## 2018-08-15 PROCEDURE — G8399 PT W/DXA RESULTS DOCUMENT: HCPCS | Performed by: INTERNAL MEDICINE

## 2018-08-15 PROCEDURE — 1090F PRES/ABSN URINE INCON ASSESS: CPT | Performed by: INTERNAL MEDICINE

## 2018-08-15 PROCEDURE — 4040F PNEUMOC VAC/ADMIN/RCVD: CPT | Performed by: INTERNAL MEDICINE

## 2018-08-15 PROCEDURE — G8417 CALC BMI ABV UP PARAM F/U: HCPCS | Performed by: INTERNAL MEDICINE

## 2018-08-15 PROCEDURE — 99214 OFFICE O/P EST MOD 30 MIN: CPT | Performed by: INTERNAL MEDICINE

## 2018-08-15 PROCEDURE — 1036F TOBACCO NON-USER: CPT | Performed by: INTERNAL MEDICINE

## 2018-08-15 PROCEDURE — G8598 ASA/ANTIPLAT THER USED: HCPCS | Performed by: INTERNAL MEDICINE

## 2018-08-15 PROCEDURE — G8427 DOCREV CUR MEDS BY ELIG CLIN: HCPCS | Performed by: INTERNAL MEDICINE

## 2018-08-15 PROCEDURE — 1123F ACP DISCUSS/DSCN MKR DOCD: CPT | Performed by: INTERNAL MEDICINE

## 2018-08-15 NOTE — PATIENT INSTRUCTIONS
Patient Education        Learning About Coronary Artery Disease (CAD)  What is coronary artery disease? Coronary artery disease (CAD) occurs when plaque builds up in the arteries that bring oxygen-rich blood to your heart. Plaque is a fatty substance made of cholesterol, calcium, and other substances in the blood. This process is called hardening of the arteries, or atherosclerosis. What happens when you have coronary artery disease? · Plaque may narrow the coronary arteries. Narrowed arteries cause poor blood flow. This can lead to angina symptoms such as chest pain or discomfort. If blood flow is completely blocked, you could have a heart attack. · You can slow CAD and reduce the risk of future problems by making changes in your lifestyle. These include quitting smoking and eating heart-healthy foods. · Treatments for CAD, along with changes in your lifestyle, can help you live a longer and healthier life. How can you prevent coronary artery disease? · Do not smoke. It may be the best thing you can do to prevent heart disease. If you need help quitting, talk to your doctor about stop-smoking programs and medicines. These can increase your chances of quitting for good. · Be active. Get at least 30 minutes of exercise on most days of the week. Walking is a good choice. You also may want to do other activities, such as running, swimming, cycling, or playing tennis or team sports. · Eat heart-healthy foods. Eat more fruits and vegetables and less foods that contain saturated and trans fats. Limit alcohol, sodium, and sweets. · Stay at a healthy weight. Lose weight if you need to. · Manage other health problems such as diabetes, high blood pressure, and high cholesterol. · Manage stress. Stress can hurt your heart. To keep stress low, talk about your problems and feelings. Don't keep your feelings hidden. · If you have talked about it with your doctor, take a low-dose aspirin every day.  Aspirin can help certain people lower their risk of a heart attack or stroke. But taking aspirin isn't right for everyone, because it can cause serious bleeding. Do not start taking daily aspirin unless your doctor knows about it. How is coronary artery disease treated? · Your doctor will suggest that you make lifestyle changes. For example, your doctor may ask you to eat healthy foods, quit smoking, lose extra weight, and be more active. · You will have to take medicines. · Your doctor may suggest a procedure to open narrowed or blocked arteries. This is called angioplasty. Or your doctor may suggest using healthy blood vessels to create detours around narrowed or blocked arteries. This is called bypass surgery. Follow-up care is a key part of your treatment and safety. Be sure to make and go to all appointments, and call your doctor if you are having problems. It's also a good idea to know your test results and keep a list of the medicines you take. Where can you learn more? Go to https://[employer].Vatler. org and sign in to your OneFold account. Enter ([phone] in the Neoprospecta box to learn more about \"Learning About Coronary Artery Disease (CAD). \"     If you do not have an account, please click on the \"Sign Up Now\" link. Current as of: December 6, 2017  Content Version: 11.7  © 0863-0354 [employer]. Care instructions adapted under license by [facility] ([facility]). If you have questions about a medical condition or this instruction, always ask your healthcare professional. [place] [address] any warranty or liability for your use of this information.

## 2018-08-27 RX ORDER — GABAPENTIN 300 MG/1
CAPSULE ORAL
Qty: 90 CAPSULE | Refills: 0 | Status: SHIPPED | OUTPATIENT
Start: 2018-08-27 | End: 2018-11-23 | Stop reason: SDUPTHER

## 2018-09-04 RX ORDER — IRBESARTAN 150 MG/1
TABLET ORAL
Qty: 90 TABLET | Refills: 3 | Status: SHIPPED | OUTPATIENT
Start: 2018-09-04 | End: 2019-08-19 | Stop reason: SDUPTHER

## 2018-09-04 RX ORDER — BISOPROLOL FUMARATE AND HYDROCHLOROTHIAZIDE 10; 6.25 MG/1; MG/1
TABLET ORAL
Qty: 90 TABLET | Refills: 1 | Status: SHIPPED | OUTPATIENT
Start: 2018-09-04 | End: 2019-01-28 | Stop reason: SDUPTHER

## 2018-09-04 RX ORDER — HYDROCHLOROTHIAZIDE 12.5 MG/1
CAPSULE, GELATIN COATED ORAL
Qty: 90 CAPSULE | Refills: 1 | Status: SHIPPED | OUTPATIENT
Start: 2018-09-04 | End: 2019-01-28 | Stop reason: SDUPTHER

## 2018-09-17 NOTE — PROGRESS NOTES
[facility]     Outpatient Cardiology         Chief Complaint   Patient presents with    Check-Up       HPI     Cecy Rawls a 69 y.o. female hx CAD s/p PCI in 2016, HTN, HLD (statin intolerant), comes today for follow up. No acute complains. Does complain of mild SOB but other than that she is ok. No chest pain/jaw/shoulder, palpitations, shortness of breath, ZAVALA, orthopnea, lower extremity edema, or PND. No cramping in legs or buttocks. No recent numbness, weakness, or paralysis.     PMH  Past Medical History:   Diagnosis Date    Bilateral primary osteoarthritis of hip     CAD (coronary artery disease)     Chronic bilateral low back pain without sciatica 2/8/2018    CKD (chronic kidney disease), stage III     DDD (degenerative disc disease), lumbar     Dr. Sreedhar Adair GERD (gastroesophageal reflux disease)     Headache(784.0)     Heel spur 08/2016    History of lumbar spinal fusion     Hyperlipidemia     Hypertension     IBS (irritable bowel syndrome)     Lyme disease 2009    suspected    MVP (mitral valve prolapse)     Neuroma nos     Osteoarthritis     Osteopenia     Rotator cuff tear     Dr. Juan Brantley    Squamous cell cancer of skin of right hand     Stress incontinence, female     Urinary incontinence        PSH  Past Surgical History:   Procedure Laterality Date    BREAST SURGERY      cyst removal    CARDIAC SURGERY  2002    stentx1    COLONOSCOPY  2006    LAMINECTOMY  5/19/14    LUMBAR 4-5 DECOMPRESSIVE LAMINECTOMY AND POSTERIOR LATERAL FUSION LUMBAR 4-5 AND PEDICLE SCREW FIXATION     PTCA  2002    stent, LAD    TONSILLECTOMY      age 10        Social HIstory  Social History   Substance Use Topics    Smoking status: Never Smoker    Smokeless tobacco: Never Used    Alcohol use No       Family History  Family History   Problem Relation Age of Onset    Diabetes Brother     Diabetes Mother     Heart Disease Mother reveals no gallop and no friction rub. No murmur heard. Pulmonary/Chest: Effort normal and breath sounds normal. No stridor. No respiratory distress. She has no wheezes. She has no rales. She exhibits no tenderness. Abdominal: Soft. Bowel sounds are normal. She exhibits no distension. There is no tenderness. There is no rebound and no guarding. Musculoskeletal: Normal range of motion. She exhibits no edema or tenderness. Lymphadenopathy:     She has no cervical adenopathy. Neurological: She is alert and oriented to person, place, and time. Coordination normal.   Skin: Skin is warm and dry. No rash noted. She is not diaphoretic. No erythema. Psychiatric: She has a normal mood and affect.  Her behavior is normal. Judgment and thought content normal.       Labs:       Lab Results   Component Value Date    WBC 4.5 05/08/2018    HGB 15.6 05/08/2018    HCT 45.8 05/08/2018    MCV 97.8 05/08/2018     05/08/2018     Lab Results   Component Value Date     08/10/2018    K 4.5 08/10/2018    CL 99 08/10/2018    CO2 32 08/10/2018    BUN 28 (H) 08/10/2018    CREATININE 1.2 08/10/2018    GLUCOSE 92 08/10/2018    CALCIUM 9.9 08/10/2018    PROT 7.2 08/10/2018    LABALBU 4.1 08/10/2018    BILITOT 0.4 08/10/2018    ALKPHOS 64 08/10/2018    AST 27 08/10/2018    ALT 20 08/10/2018    LABGLOM 44 (A) 08/10/2018    GFRAA 53 (A) 08/10/2018    AGRATIO 1.3 08/10/2018    GLOB 3.1 08/10/2018         Lab Results   Component Value Date    CHOL 260 (H) 08/10/2018    CHOL 278 (H) 05/08/2018    CHOL 281 (H) 02/08/2018     Lab Results   Component Value Date    TRIG 178 (H) 08/10/2018    TRIG 256 (H) 05/08/2018    TRIG 382 (H) 02/08/2018     Lab Results   Component Value Date    HDL 49 08/10/2018    HDL 54 05/08/2018    HDL 51 02/08/2018     Lab Results   Component Value Date    LDLCALC 175 (H) 08/10/2018    LDLCALC 173 (H) 05/08/2018    LDLCALC see below 02/08/2018     Lab Results   Component Value Date    LABVLDL 36

## 2018-09-18 ENCOUNTER — OFFICE VISIT (OUTPATIENT)
Dept: CARDIOLOGY CLINIC | Age: 78
End: 2018-09-18

## 2018-09-18 VITALS
HEART RATE: 73 BPM | WEIGHT: 184 LBS | SYSTOLIC BLOOD PRESSURE: 121 MMHG | BODY MASS INDEX: 32.6 KG/M2 | DIASTOLIC BLOOD PRESSURE: 70 MMHG | OXYGEN SATURATION: 97 % | HEIGHT: 63 IN

## 2018-09-18 DIAGNOSIS — I10 BENIGN ESSENTIAL HTN: ICD-10-CM

## 2018-09-18 DIAGNOSIS — N39.3 STRESS INCONTINENCE, FEMALE: ICD-10-CM

## 2018-09-18 DIAGNOSIS — I25.10 CAD IN NATIVE ARTERY: ICD-10-CM

## 2018-09-18 DIAGNOSIS — E78.5 DYSLIPIDEMIA: ICD-10-CM

## 2018-09-18 PROCEDURE — 1101F PT FALLS ASSESS-DOCD LE1/YR: CPT | Performed by: INTERNAL MEDICINE

## 2018-09-18 PROCEDURE — 1090F PRES/ABSN URINE INCON ASSESS: CPT | Performed by: INTERNAL MEDICINE

## 2018-09-18 PROCEDURE — 1123F ACP DISCUSS/DSCN MKR DOCD: CPT | Performed by: INTERNAL MEDICINE

## 2018-09-18 PROCEDURE — G8598 ASA/ANTIPLAT THER USED: HCPCS | Performed by: INTERNAL MEDICINE

## 2018-09-18 PROCEDURE — 1036F TOBACCO NON-USER: CPT | Performed by: INTERNAL MEDICINE

## 2018-09-18 PROCEDURE — G8399 PT W/DXA RESULTS DOCUMENT: HCPCS | Performed by: INTERNAL MEDICINE

## 2018-09-18 PROCEDURE — G8417 CALC BMI ABV UP PARAM F/U: HCPCS | Performed by: INTERNAL MEDICINE

## 2018-09-18 PROCEDURE — 4040F PNEUMOC VAC/ADMIN/RCVD: CPT | Performed by: INTERNAL MEDICINE

## 2018-09-18 PROCEDURE — 93000 ELECTROCARDIOGRAM COMPLETE: CPT | Performed by: INTERNAL MEDICINE

## 2018-09-18 PROCEDURE — G8427 DOCREV CUR MEDS BY ELIG CLIN: HCPCS | Performed by: INTERNAL MEDICINE

## 2018-09-18 PROCEDURE — 99204 OFFICE O/P NEW MOD 45 MIN: CPT | Performed by: INTERNAL MEDICINE

## 2018-09-18 PROCEDURE — 0509F URINE INCON PLAN DOCD: CPT | Performed by: INTERNAL MEDICINE

## 2018-09-18 ASSESSMENT — ENCOUNTER SYMPTOMS
ABDOMINAL PAIN: 0
EYE DISCHARGE: 0
CHEST TIGHTNESS: 0
COLOR CHANGE: 0
WHEEZING: 0
BLOOD IN STOOL: 0
BACK PAIN: 0
VOMITING: 0
COUGH: 0
ABDOMINAL DISTENTION: 0
FACIAL SWELLING: 0
SHORTNESS OF BREATH: 0

## 2018-09-24 ENCOUNTER — HOSPITAL ENCOUNTER (OUTPATIENT)
Dept: ULTRASOUND IMAGING | Age: 78
Discharge: HOME | End: 2018-09-24
Payer: MEDICARE

## 2018-09-24 DIAGNOSIS — N28.1 ACQUIRED CYST OF KIDNEY: ICD-10-CM

## 2018-09-24 DIAGNOSIS — N28.1 RENAL CYST, LEFT: ICD-10-CM

## 2018-09-24 PROCEDURE — 76770 US EXAM ABDO BACK WALL COMP: CPT

## 2018-10-05 ENCOUNTER — HOSPITAL ENCOUNTER (OUTPATIENT)
Age: 78
Discharge: HOME | End: 2018-10-05
Payer: MEDICARE

## 2018-10-05 LAB
ALBUMIN SERPL-MCNC: 4.3 G/DL (ref 3.4–5)
ANION GAP SERPL CALCULATED.3IONS-SCNC: 13 MMOL/L (ref 3–16)
BASOPHILS ABSOLUTE: 0 K/UL (ref 0–0.2)
BASOPHILS RELATIVE PERCENT: 0.8 %
BUN BLDV-MCNC: 27 MG/DL (ref 7–20)
CALCIUM SERPL-MCNC: 9.9 MG/DL (ref 8.3–10.6)
CHLORIDE BLD-SCNC: 100 MMOL/L (ref 99–110)
CO2: 28 MMOL/L (ref 21–32)
CREAT SERPL-MCNC: 1.3 MG/DL (ref 0.6–1.2)
CREATININE URINE: 41.1 MG/DL (ref 28–259)
EOSINOPHILS ABSOLUTE: 0.2 K/UL (ref 0–0.6)
EOSINOPHILS RELATIVE PERCENT: 3.2 %
GFR AFRICAN AMERICAN: 48
GFR NON-AFRICAN AMERICAN: 40
GLUCOSE BLD-MCNC: 84 MG/DL (ref 70–99)
HCT VFR BLD CALC: 45.2 % (ref 36–48)
HEMOGLOBIN: 15.2 G/DL (ref 12–16)
LYMPHOCYTES ABSOLUTE: 2 K/UL (ref 1–5.1)
LYMPHOCYTES RELATIVE PERCENT: 31.9 %
MCH RBC QN AUTO: 33.2 PG (ref 26–34)
MCHC RBC AUTO-ENTMCNC: 33.6 G/DL (ref 31–36)
MCV RBC AUTO: 99.1 FL (ref 80–100)
MONOCYTES ABSOLUTE: 0.6 K/UL (ref 0–1.3)
MONOCYTES RELATIVE PERCENT: 9.1 %
NEUTROPHILS ABSOLUTE: 3.4 K/UL (ref 1.7–7.7)
NEUTROPHILS RELATIVE PERCENT: 55 %
PARATHYROID HORMONE INTACT: 38.2 PG/ML (ref 14–72)
PDW BLD-RTO: 13.3 % (ref 12.4–15.4)
PHOSPHORUS: 3.2 MG/DL (ref 2.5–4.9)
PLATELET # BLD: 154 K/UL (ref 135–450)
PMV BLD AUTO: 10.7 FL (ref 5–10.5)
POTASSIUM SERPL-SCNC: 4.6 MMOL/L (ref 3.5–5.1)
PROTEIN PROTEIN: <4 MG/DL
RBC # BLD: 4.56 M/UL (ref 4–5.2)
SODIUM BLD-SCNC: 141 MMOL/L (ref 136–145)
VITAMIN D 25-HYDROXY: 45.4 NG/ML
WBC # BLD: 6.2 K/UL (ref 4–11)

## 2018-10-05 PROCEDURE — 36415 COLL VENOUS BLD VENIPUNCTURE: CPT

## 2018-10-05 PROCEDURE — 82306 VITAMIN D 25 HYDROXY: CPT

## 2018-10-05 PROCEDURE — 84156 ASSAY OF PROTEIN URINE: CPT

## 2018-10-05 PROCEDURE — 85025 COMPLETE CBC W/AUTO DIFF WBC: CPT

## 2018-10-05 PROCEDURE — 83970 ASSAY OF PARATHORMONE: CPT

## 2018-10-05 PROCEDURE — 80069 RENAL FUNCTION PANEL: CPT

## 2018-10-05 PROCEDURE — 82570 ASSAY OF URINE CREATININE: CPT

## 2018-10-12 ENCOUNTER — OFFICE VISIT (OUTPATIENT)
Dept: ORTHOPEDIC SURGERY | Age: 78
End: 2018-10-12
Payer: MEDICARE

## 2018-10-12 VITALS
WEIGHT: 182 LBS | HEIGHT: 64 IN | SYSTOLIC BLOOD PRESSURE: 131 MMHG | BODY MASS INDEX: 31.07 KG/M2 | DIASTOLIC BLOOD PRESSURE: 78 MMHG

## 2018-10-12 DIAGNOSIS — M48.061 LUMBAR STENOSIS WITHOUT NEUROGENIC CLAUDICATION: ICD-10-CM

## 2018-10-12 DIAGNOSIS — M51.36 DDD (DEGENERATIVE DISC DISEASE), LUMBAR: ICD-10-CM

## 2018-10-12 DIAGNOSIS — M96.1 LUMBAR POST-LAMINECTOMY SYNDROME: Primary | ICD-10-CM

## 2018-10-12 DIAGNOSIS — M47.816 SPONDYLOSIS OF LUMBAR REGION WITHOUT MYELOPATHY OR RADICULOPATHY: ICD-10-CM

## 2018-10-12 PROCEDURE — 4040F PNEUMOC VAC/ADMIN/RCVD: CPT | Performed by: PHYSICAL MEDICINE & REHABILITATION

## 2018-10-12 PROCEDURE — 1036F TOBACCO NON-USER: CPT | Performed by: PHYSICAL MEDICINE & REHABILITATION

## 2018-10-12 PROCEDURE — G8399 PT W/DXA RESULTS DOCUMENT: HCPCS | Performed by: PHYSICAL MEDICINE & REHABILITATION

## 2018-10-12 PROCEDURE — 99213 OFFICE O/P EST LOW 20 MIN: CPT | Performed by: PHYSICAL MEDICINE & REHABILITATION

## 2018-10-12 PROCEDURE — G8417 CALC BMI ABV UP PARAM F/U: HCPCS | Performed by: PHYSICAL MEDICINE & REHABILITATION

## 2018-10-12 PROCEDURE — G8598 ASA/ANTIPLAT THER USED: HCPCS | Performed by: PHYSICAL MEDICINE & REHABILITATION

## 2018-10-12 PROCEDURE — G8427 DOCREV CUR MEDS BY ELIG CLIN: HCPCS | Performed by: PHYSICAL MEDICINE & REHABILITATION

## 2018-10-12 PROCEDURE — 1123F ACP DISCUSS/DSCN MKR DOCD: CPT | Performed by: PHYSICAL MEDICINE & REHABILITATION

## 2018-10-12 PROCEDURE — G8484 FLU IMMUNIZE NO ADMIN: HCPCS | Performed by: PHYSICAL MEDICINE & REHABILITATION

## 2018-10-12 PROCEDURE — 1090F PRES/ABSN URINE INCON ASSESS: CPT | Performed by: PHYSICAL MEDICINE & REHABILITATION

## 2018-10-12 PROCEDURE — 1101F PT FALLS ASSESS-DOCD LE1/YR: CPT | Performed by: PHYSICAL MEDICINE & REHABILITATION

## 2018-10-12 NOTE — PROGRESS NOTES
Lumbar alignment is normal. No instability is noted. · Palpation:   No evidence of tenderness at the midline. No tenderness bilaterally at the paraspinal or trochanters. There is no paraspinal spasm. · Range of Motion: limited by 25% in all planes due to pain  · Strength:   Strength testing is 5/5 in all muscle groups tested. · Special Tests:   Straight leg raise and crossed SLR negative  · Skin: There are no rashes, ulcerations or lesions. · Reflexes: Reflexes are symmetrically 2+ at the patellar and ankle tendons. Clonus absent bilaterally at the feet. · Gait & station: normal, patient ambulates without assistance  · Additional Examinations:  · RIGHT LOWER EXTREMITY: Inspection/examination of the right lower extremity does not show any tenderness, deformity or injury. Range of motion is normal and pain-free. There is no gross instability. There are no rashes, ulcerations or lesions. Strength and tone are normal. No atrophy or abnormal movements are noted. · LEFT LOWER EXTREMITY:  Inspection/examination of the left lower extremity does not show any tenderness, deformity or injury. Range of motion is normal and pain-free. There is no gross instability. There are no rashes, ulcerations or lesions. Strength and tone are normal. No atrophy or abnormal movements are noted.       Diagnostic Testing:    MR Lumbar spine shows    Approximately 8 mm anterolisthesis at L4-5.  Posterior fixation at L4-5 is   noted.       There is moderate spinal canal stenosis at L2-3.  There is moderate   multilevel neural foraminal narrowing.           Results for orders placed or performed during the hospital encounter of 10/05/18   Vitamin D 25 Hydroxy   Result Value Ref Range    Vit D, 25-Hydroxy 45.4 >=30 ng/mL   CBC Auto Differential   Result Value Ref Range    WBC 6.2 4.0 - 11.0 K/uL    RBC 4.56 4.00 - 5.20 M/uL    Hemoglobin 15.2 12.0 - 16.0 g/dL    Hematocrit 45.2 36.0 - 48.0 %    MCV 99.1 80.0 - 100.0 fL    MCH 33.2 26.0 -

## 2018-10-17 ENCOUNTER — TELEPHONE (OUTPATIENT)
Dept: ORTHOPEDIC SURGERY | Age: 78
End: 2018-10-17

## 2018-10-24 ENCOUNTER — HOSPITAL ENCOUNTER (OUTPATIENT)
Age: 78
Setting detail: OUTPATIENT SURGERY
Discharge: HOME | End: 2018-10-24
Attending: PHYSICAL MEDICINE & REHABILITATION | Admitting: PHYSICAL MEDICINE & REHABILITATION
Payer: MEDICARE

## 2018-10-24 ENCOUNTER — APPOINTMENT (OUTPATIENT)
Dept: GENERAL RADIOLOGY | Age: 78
End: 2018-10-24
Attending: PHYSICAL MEDICINE & REHABILITATION
Payer: MEDICARE

## 2018-10-24 VITALS
HEIGHT: 64 IN | HEART RATE: 57 BPM | TEMPERATURE: 97 F | DIASTOLIC BLOOD PRESSURE: 64 MMHG | BODY MASS INDEX: 31.58 KG/M2 | WEIGHT: 185 LBS | SYSTOLIC BLOOD PRESSURE: 172 MMHG | RESPIRATION RATE: 16 BRPM | OXYGEN SATURATION: 97 %

## 2018-10-24 PROCEDURE — 2709999900 HC NON-CHARGEABLE SUPPLY: Performed by: PHYSICAL MEDICINE & REHABILITATION

## 2018-10-24 PROCEDURE — 77003 FLUOROGUIDE FOR SPINE INJECT: CPT

## 2018-10-24 PROCEDURE — 6360000002 HC RX W HCPCS: Performed by: PHYSICAL MEDICINE & REHABILITATION

## 2018-10-24 PROCEDURE — 2580000003 HC RX 258: Performed by: PHYSICAL MEDICINE & REHABILITATION

## 2018-10-24 PROCEDURE — 2500000003 HC RX 250 WO HCPCS: Performed by: PHYSICAL MEDICINE & REHABILITATION

## 2018-10-24 PROCEDURE — 3610000054 HC PAIN LEVEL 3 BASE (NON-OR): Performed by: PHYSICAL MEDICINE & REHABILITATION

## 2018-10-24 RX ORDER — LIDOCAINE HYDROCHLORIDE 10 MG/ML
INJECTION, SOLUTION EPIDURAL; INFILTRATION; INTRACAUDAL; PERINEURAL
Status: COMPLETED | OUTPATIENT
Start: 2018-10-24 | End: 2018-10-24

## 2018-10-24 RX ORDER — METHYLPREDNISOLONE ACETATE 80 MG/ML
INJECTION, SUSPENSION INTRA-ARTICULAR; INTRALESIONAL; INTRAMUSCULAR; SOFT TISSUE
Status: COMPLETED | OUTPATIENT
Start: 2018-10-24 | End: 2018-10-24

## 2018-10-24 RX ORDER — SODIUM CHLORIDE 0.9 % (FLUSH) 0.9 %
SYRINGE (ML) INJECTION
Status: COMPLETED | OUTPATIENT
Start: 2018-10-24 | End: 2018-10-24

## 2018-10-24 ASSESSMENT — PAIN SCALES - GENERAL: PAINLEVEL_OUTOF10: 2

## 2018-10-24 ASSESSMENT — PAIN - FUNCTIONAL ASSESSMENT: PAIN_FUNCTIONAL_ASSESSMENT: 0-10

## 2018-10-24 ASSESSMENT — PAIN DESCRIPTION - DESCRIPTORS: DESCRIPTORS: ACHING

## 2018-10-24 NOTE — OP NOTE
Patient:  Angela Hinds  YOB: 1940  Medical Record #:  [id number]   Place:   [address] [place] [address]  Date:  10/24/2018   Physician:  Shameka Deshpande MD, MARCELINA    Procedure:  Caudal Lumbar Epidural Steroid Injection     Pre-Procedure Diagnosis: Post lumbar laminectomy syndrome, Lumbar stenosis with neurogenic claudication    Post-Procedure Diagnosis: Same    Sedation: Local with 1% Lidocaine 3 ml and no IV sedation    EBL: None    Complications: None    Procedure Summary:  The patient was seen in the office for complaints of low back and bilateral leg pain. Review of the imaging and physical exam of the patient confirmed the pre-procedure diagnosis. After a thorough discussion of risks, benefits and alternatives informed consent was obtained. The patient was brought to the procedure suite and placed in the prone position. The skin overlying the lumbar spine was prepped and draped in the usual sterile fashion. Using fluoroscopic guidance, the sacrococcygeal ligament was identified. Through anesthetized skin a 22 gauge 3.5 inch curved tip needle  was advanced into the epidural spac. Isovue M300 was instilled and an epidurogram was noted without evidence of intrathecal or vascular spread. 10 ml of a solution containing preservative free normal saline and 80 mg of Depomedrol was instilled. The needle was removed and a band-aid applied. The patient was transferred to the post-operative area in stable condition.

## 2018-10-31 DIAGNOSIS — G89.29 CHRONIC BILATERAL LOW BACK PAIN WITHOUT SCIATICA: ICD-10-CM

## 2018-10-31 DIAGNOSIS — M54.50 CHRONIC BILATERAL LOW BACK PAIN WITHOUT SCIATICA: ICD-10-CM

## 2018-10-31 DIAGNOSIS — M48.061 SPINAL STENOSIS OF LUMBAR REGION WITHOUT NEUROGENIC CLAUDICATION: ICD-10-CM

## 2018-10-31 DIAGNOSIS — M51.36 DDD (DEGENERATIVE DISC DISEASE), LUMBAR: ICD-10-CM

## 2018-10-31 RX ORDER — TRAMADOL HYDROCHLORIDE 50 MG/1
50 TABLET ORAL EVERY 6 HOURS PRN
Qty: 360 TABLET | Refills: 0 | Status: CANCELLED | OUTPATIENT
Start: 2018-10-31 | End: 2019-01-29

## 2018-10-31 RX ORDER — TRAMADOL HYDROCHLORIDE 50 MG/1
50 TABLET ORAL EVERY 6 HOURS PRN
Qty: 360 TABLET | Refills: 0 | Status: CANCELLED | OUTPATIENT
Start: 2018-10-31 | End: 2018-11-10

## 2018-11-01 RX ORDER — TRAMADOL HYDROCHLORIDE 50 MG/1
50 TABLET ORAL 4 TIMES DAILY PRN
Qty: 360 TABLET | Refills: 1 | Status: SHIPPED | OUTPATIENT
Start: 2018-11-01 | End: 2019-04-30

## 2018-11-12 ENCOUNTER — OFFICE VISIT (OUTPATIENT)
Dept: ORTHOPEDIC SURGERY | Age: 78
End: 2018-11-12
Payer: MEDICARE

## 2018-11-12 VITALS
DIASTOLIC BLOOD PRESSURE: 78 MMHG | WEIGHT: 184.97 LBS | SYSTOLIC BLOOD PRESSURE: 131 MMHG | HEIGHT: 64 IN | BODY MASS INDEX: 31.58 KG/M2

## 2018-11-12 DIAGNOSIS — M25.552 PAIN OF BOTH HIP JOINTS: ICD-10-CM

## 2018-11-12 DIAGNOSIS — M25.551 PAIN OF BOTH HIP JOINTS: ICD-10-CM

## 2018-11-12 DIAGNOSIS — M48.061 LUMBAR FORAMINAL STENOSIS: ICD-10-CM

## 2018-11-12 DIAGNOSIS — M43.16 SPONDYLOLISTHESIS OF LUMBAR REGION: Primary | ICD-10-CM

## 2018-11-12 PROCEDURE — 1123F ACP DISCUSS/DSCN MKR DOCD: CPT | Performed by: PHYSICAL MEDICINE & REHABILITATION

## 2018-11-12 PROCEDURE — G8484 FLU IMMUNIZE NO ADMIN: HCPCS | Performed by: PHYSICAL MEDICINE & REHABILITATION

## 2018-11-12 PROCEDURE — 4040F PNEUMOC VAC/ADMIN/RCVD: CPT | Performed by: PHYSICAL MEDICINE & REHABILITATION

## 2018-11-12 PROCEDURE — G8598 ASA/ANTIPLAT THER USED: HCPCS | Performed by: PHYSICAL MEDICINE & REHABILITATION

## 2018-11-12 PROCEDURE — 1036F TOBACCO NON-USER: CPT | Performed by: PHYSICAL MEDICINE & REHABILITATION

## 2018-11-12 PROCEDURE — 1090F PRES/ABSN URINE INCON ASSESS: CPT | Performed by: PHYSICAL MEDICINE & REHABILITATION

## 2018-11-12 PROCEDURE — 99213 OFFICE O/P EST LOW 20 MIN: CPT | Performed by: PHYSICAL MEDICINE & REHABILITATION

## 2018-11-12 PROCEDURE — 1101F PT FALLS ASSESS-DOCD LE1/YR: CPT | Performed by: PHYSICAL MEDICINE & REHABILITATION

## 2018-11-12 PROCEDURE — G8399 PT W/DXA RESULTS DOCUMENT: HCPCS | Performed by: PHYSICAL MEDICINE & REHABILITATION

## 2018-11-12 PROCEDURE — G8417 CALC BMI ABV UP PARAM F/U: HCPCS | Performed by: PHYSICAL MEDICINE & REHABILITATION

## 2018-11-12 PROCEDURE — G8427 DOCREV CUR MEDS BY ELIG CLIN: HCPCS | Performed by: PHYSICAL MEDICINE & REHABILITATION

## 2018-11-13 ENCOUNTER — TELEPHONE (OUTPATIENT)
Dept: INTERNAL MEDICINE CLINIC | Age: 78
End: 2018-11-13

## 2018-11-13 DIAGNOSIS — I10 BENIGN ESSENTIAL HTN: Primary | ICD-10-CM

## 2018-11-13 DIAGNOSIS — E78.5 DYSLIPIDEMIA: ICD-10-CM

## 2018-11-13 DIAGNOSIS — I25.10 CAD IN NATIVE ARTERY: ICD-10-CM

## 2018-11-13 NOTE — TELEPHONE ENCOUNTER
Patient is requesting an order for all labs deemed necessary please be placed in Epic to be done @ that hospital.  She wants to get them done tomorrow so results are available for appointment this  Friday. Please contact patient @ phone # provided.

## 2018-11-16 ENCOUNTER — OFFICE VISIT (OUTPATIENT)
Dept: INTERNAL MEDICINE CLINIC | Age: 78
End: 2018-11-16
Payer: MEDICARE

## 2018-11-16 VITALS
RESPIRATION RATE: 12 BRPM | DIASTOLIC BLOOD PRESSURE: 80 MMHG | HEIGHT: 64 IN | WEIGHT: 182 LBS | HEART RATE: 76 BPM | BODY MASS INDEX: 31.07 KG/M2 | SYSTOLIC BLOOD PRESSURE: 120 MMHG

## 2018-11-16 DIAGNOSIS — G89.29 CHRONIC BILATERAL LOW BACK PAIN WITHOUT SCIATICA: ICD-10-CM

## 2018-11-16 DIAGNOSIS — M54.50 CHRONIC BILATERAL LOW BACK PAIN WITHOUT SCIATICA: ICD-10-CM

## 2018-11-16 DIAGNOSIS — I10 BENIGN ESSENTIAL HTN: Primary | ICD-10-CM

## 2018-11-16 DIAGNOSIS — I83.93 VARICOSE VEINS OF BOTH LOWER EXTREMITIES, UNSPECIFIED WHETHER COMPLICATED: ICD-10-CM

## 2018-11-16 DIAGNOSIS — E78.5 DYSLIPIDEMIA: ICD-10-CM

## 2018-11-16 DIAGNOSIS — I25.10 CAD IN NATIVE ARTERY: ICD-10-CM

## 2018-11-16 DIAGNOSIS — N18.30 CKD (CHRONIC KIDNEY DISEASE), STAGE III (HCC): ICD-10-CM

## 2018-11-16 DIAGNOSIS — Z23 FLU VACCINE NEED: ICD-10-CM

## 2018-11-16 PROBLEM — Z48.02 VISIT FOR SUTURE REMOVAL: Status: RESOLVED | Noted: 2018-06-07 | Resolved: 2018-11-16

## 2018-11-16 PROCEDURE — G8482 FLU IMMUNIZE ORDER/ADMIN: HCPCS | Performed by: INTERNAL MEDICINE

## 2018-11-16 PROCEDURE — G8417 CALC BMI ABV UP PARAM F/U: HCPCS | Performed by: INTERNAL MEDICINE

## 2018-11-16 PROCEDURE — G8399 PT W/DXA RESULTS DOCUMENT: HCPCS | Performed by: INTERNAL MEDICINE

## 2018-11-16 PROCEDURE — 1123F ACP DISCUSS/DSCN MKR DOCD: CPT | Performed by: INTERNAL MEDICINE

## 2018-11-16 PROCEDURE — 99214 OFFICE O/P EST MOD 30 MIN: CPT | Performed by: INTERNAL MEDICINE

## 2018-11-16 PROCEDURE — 4040F PNEUMOC VAC/ADMIN/RCVD: CPT | Performed by: INTERNAL MEDICINE

## 2018-11-16 PROCEDURE — 1036F TOBACCO NON-USER: CPT | Performed by: INTERNAL MEDICINE

## 2018-11-16 PROCEDURE — 1090F PRES/ABSN URINE INCON ASSESS: CPT | Performed by: INTERNAL MEDICINE

## 2018-11-16 PROCEDURE — G8427 DOCREV CUR MEDS BY ELIG CLIN: HCPCS | Performed by: INTERNAL MEDICINE

## 2018-11-16 PROCEDURE — G0008 ADMIN INFLUENZA VIRUS VAC: HCPCS | Performed by: INTERNAL MEDICINE

## 2018-11-16 PROCEDURE — 90662 IIV NO PRSV INCREASED AG IM: CPT | Performed by: INTERNAL MEDICINE

## 2018-11-16 PROCEDURE — 1101F PT FALLS ASSESS-DOCD LE1/YR: CPT | Performed by: INTERNAL MEDICINE

## 2018-11-16 PROCEDURE — G8598 ASA/ANTIPLAT THER USED: HCPCS | Performed by: INTERNAL MEDICINE

## 2018-11-16 NOTE — PROGRESS NOTES
program  Continue tramadol. OARRS is up to date    10. Flu vaccine need    - INFLUENZA, HIGH DOSE, 65 YRS +, IM, PF, PREFILL SYR, 0.5ML (FLUZONE HD)    7. Varicose veins of both lower extremities, unspecified whether complicated  Compression stockings may be helpful.

## 2018-11-16 NOTE — PATIENT INSTRUCTIONS
Patient Education        Varicose Veins: Care Instructions  Your Care Instructions  Varicose veins are twisted, enlarged veins near the surface of the skin. They develop most often in the legs and ankles. Some people may be more likely than others to get varicose veins because of aging or hormone changes or because a parent has them. Being overweight or pregnant can make varicose veins worse. Jobs that require standing for long periods of time also can make them worse. Follow-up care is a key part of your treatment and safety. Be sure to make and go to all appointments, and call your doctor if you are having problems. It's also a good idea to know your test results and keep a list of the medicines you take. How can you care for yourself at home? · Wear compression stockings during the day to help relieve symptoms. They improve blood flow and are the main treatment for varicose veins. Talk to your doctor about which ones to get and where to get them. · Prop up your legs at or above the level of your heart when possible. This helps keep the blood from pooling in your lower legs and improves blood flow to the rest of your body. · Avoid sitting and standing for long periods. This puts added stress on your veins. · Get regular exercise, and control your weight. Walk, bicycle, or swim to improve blood flow in your legs. · If you bump your leg so hard that you know it is likely to bruise, prop up your leg and put ice or a cold pack on the area for 10 to 20 minutes at a time. Try to do this every 1 to 2 hours for the next 3 days (when you are awake) or until the swelling goes down. Put a thin cloth between the ice and your skin. · If you cut or scratch the skin over a vein, it may bleed a lot. Prop up your leg and apply firm pressure with a clean bandage over the site of the bleeding. Continue to apply pressure for a full 15 minutes. Do not check sooner to see if the bleeding has stopped.  If the bleeding has not

## 2018-11-26 RX ORDER — GABAPENTIN 300 MG/1
CAPSULE ORAL
Qty: 90 CAPSULE | Refills: 0 | Status: SHIPPED | OUTPATIENT
Start: 2018-11-26 | End: 2019-03-08 | Stop reason: SDUPTHER

## 2019-01-18 ENCOUNTER — TELEPHONE (OUTPATIENT)
Dept: ORTHOPEDIC SURGERY | Age: 79
End: 2019-01-18

## 2019-01-18 ENCOUNTER — OFFICE VISIT (OUTPATIENT)
Dept: ORTHOPEDIC SURGERY | Age: 79
End: 2019-01-18
Payer: MEDICARE

## 2019-01-18 VITALS
WEIGHT: 182.1 LBS | SYSTOLIC BLOOD PRESSURE: 131 MMHG | BODY MASS INDEX: 31.09 KG/M2 | DIASTOLIC BLOOD PRESSURE: 78 MMHG | HEIGHT: 64 IN

## 2019-01-18 DIAGNOSIS — M51.36 DDD (DEGENERATIVE DISC DISEASE), LUMBAR: ICD-10-CM

## 2019-01-18 DIAGNOSIS — M16.0 PRIMARY OSTEOARTHRITIS OF BOTH HIPS: ICD-10-CM

## 2019-01-18 DIAGNOSIS — M47.816 SPONDYLOSIS OF LUMBAR REGION WITHOUT MYELOPATHY OR RADICULOPATHY: ICD-10-CM

## 2019-01-18 DIAGNOSIS — G89.4 CHRONIC PAIN SYNDROME: ICD-10-CM

## 2019-01-18 DIAGNOSIS — M96.1 LUMBAR POST-LAMINECTOMY SYNDROME: Primary | ICD-10-CM

## 2019-01-18 PROCEDURE — G8399 PT W/DXA RESULTS DOCUMENT: HCPCS | Performed by: PHYSICAL MEDICINE & REHABILITATION

## 2019-01-18 PROCEDURE — 1123F ACP DISCUSS/DSCN MKR DOCD: CPT | Performed by: PHYSICAL MEDICINE & REHABILITATION

## 2019-01-18 PROCEDURE — G8482 FLU IMMUNIZE ORDER/ADMIN: HCPCS | Performed by: PHYSICAL MEDICINE & REHABILITATION

## 2019-01-18 PROCEDURE — 1090F PRES/ABSN URINE INCON ASSESS: CPT | Performed by: PHYSICAL MEDICINE & REHABILITATION

## 2019-01-18 PROCEDURE — G8598 ASA/ANTIPLAT THER USED: HCPCS | Performed by: PHYSICAL MEDICINE & REHABILITATION

## 2019-01-18 PROCEDURE — 99213 OFFICE O/P EST LOW 20 MIN: CPT | Performed by: PHYSICAL MEDICINE & REHABILITATION

## 2019-01-18 PROCEDURE — 1036F TOBACCO NON-USER: CPT | Performed by: PHYSICAL MEDICINE & REHABILITATION

## 2019-01-18 PROCEDURE — G8417 CALC BMI ABV UP PARAM F/U: HCPCS | Performed by: PHYSICAL MEDICINE & REHABILITATION

## 2019-01-18 PROCEDURE — 1101F PT FALLS ASSESS-DOCD LE1/YR: CPT | Performed by: PHYSICAL MEDICINE & REHABILITATION

## 2019-01-18 PROCEDURE — 4040F PNEUMOC VAC/ADMIN/RCVD: CPT | Performed by: PHYSICAL MEDICINE & REHABILITATION

## 2019-01-18 PROCEDURE — G8427 DOCREV CUR MEDS BY ELIG CLIN: HCPCS | Performed by: PHYSICAL MEDICINE & REHABILITATION

## 2019-01-18 NOTE — TELEPHONE ENCOUNTER
Patient has been referred to Dr. Dave Olson for psychology evaluation to undergo SCS trial.  Referral faxed to Dr. Dave Olson office for scheduling purposes. Patient was instructed to call our office and notify staff of appointment date/time. Patient was asked to not schedule follow up appointment at this time, as we will wait until psychology evaluation has been received and reviewed by Dr. Nikole Copeland. Per Dr. Nikole Copeland, patient will be scheduled with [facility] ([place] for SCS consult once evaluation is received.

## 2019-01-28 RX ORDER — HYDROCHLOROTHIAZIDE 12.5 MG/1
CAPSULE, GELATIN COATED ORAL
Qty: 90 CAPSULE | Refills: 1 | Status: SHIPPED | OUTPATIENT
Start: 2019-01-28 | End: 2019-08-19 | Stop reason: SDUPTHER

## 2019-01-28 RX ORDER — BISOPROLOL FUMARATE AND HYDROCHLOROTHIAZIDE 10; 6.25 MG/1; MG/1
TABLET ORAL
Qty: 90 TABLET | Refills: 1 | Status: SHIPPED | OUTPATIENT
Start: 2019-01-28 | End: 2019-08-19 | Stop reason: SDUPTHER

## 2019-02-06 ENCOUNTER — TELEPHONE (OUTPATIENT)
Dept: ORTHOPEDIC SURGERY | Age: 79
End: 2019-02-06

## 2019-02-15 ENCOUNTER — TELEPHONE (OUTPATIENT)
Dept: ORTHOPEDIC SURGERY | Age: 79
End: 2019-02-15

## 2019-02-15 ENCOUNTER — OFFICE VISIT (OUTPATIENT)
Dept: ORTHOPEDIC SURGERY | Age: 79
End: 2019-02-15
Payer: MEDICARE

## 2019-02-15 VITALS
DIASTOLIC BLOOD PRESSURE: 78 MMHG | HEIGHT: 64 IN | SYSTOLIC BLOOD PRESSURE: 131 MMHG | BODY MASS INDEX: 31.09 KG/M2 | WEIGHT: 182.1 LBS

## 2019-02-15 DIAGNOSIS — M47.814 SPONDYLOSIS OF THORACIC REGION WITHOUT MYELOPATHY OR RADICULOPATHY: Primary | ICD-10-CM

## 2019-02-15 DIAGNOSIS — G89.4 CHRONIC PAIN SYNDROME: ICD-10-CM

## 2019-02-15 DIAGNOSIS — M96.1 LUMBAR POST-LAMINECTOMY SYNDROME: ICD-10-CM

## 2019-02-15 PROCEDURE — G8482 FLU IMMUNIZE ORDER/ADMIN: HCPCS | Performed by: PHYSICAL MEDICINE & REHABILITATION

## 2019-02-15 PROCEDURE — G8417 CALC BMI ABV UP PARAM F/U: HCPCS | Performed by: PHYSICAL MEDICINE & REHABILITATION

## 2019-02-15 PROCEDURE — 4040F PNEUMOC VAC/ADMIN/RCVD: CPT | Performed by: PHYSICAL MEDICINE & REHABILITATION

## 2019-02-15 PROCEDURE — 1090F PRES/ABSN URINE INCON ASSESS: CPT | Performed by: PHYSICAL MEDICINE & REHABILITATION

## 2019-02-15 PROCEDURE — G8399 PT W/DXA RESULTS DOCUMENT: HCPCS | Performed by: PHYSICAL MEDICINE & REHABILITATION

## 2019-02-15 PROCEDURE — G8598 ASA/ANTIPLAT THER USED: HCPCS | Performed by: PHYSICAL MEDICINE & REHABILITATION

## 2019-02-15 PROCEDURE — 99214 OFFICE O/P EST MOD 30 MIN: CPT | Performed by: PHYSICAL MEDICINE & REHABILITATION

## 2019-02-15 PROCEDURE — 1101F PT FALLS ASSESS-DOCD LE1/YR: CPT | Performed by: PHYSICAL MEDICINE & REHABILITATION

## 2019-02-15 PROCEDURE — G8427 DOCREV CUR MEDS BY ELIG CLIN: HCPCS | Performed by: PHYSICAL MEDICINE & REHABILITATION

## 2019-02-15 PROCEDURE — 1123F ACP DISCUSS/DSCN MKR DOCD: CPT | Performed by: PHYSICAL MEDICINE & REHABILITATION

## 2019-02-15 PROCEDURE — 1036F TOBACCO NON-USER: CPT | Performed by: PHYSICAL MEDICINE & REHABILITATION

## 2019-02-22 ENCOUNTER — HOSPITAL ENCOUNTER (OUTPATIENT)
Dept: MRI IMAGING | Age: 79
Discharge: HOME | End: 2019-02-22
Payer: MEDICARE

## 2019-02-22 DIAGNOSIS — M47.814 SPONDYLOSIS OF THORACIC REGION WITHOUT MYELOPATHY OR RADICULOPATHY: ICD-10-CM

## 2019-02-22 PROCEDURE — 72146 MRI CHEST SPINE W/O DYE: CPT

## 2019-03-07 RX ORDER — CEPHALEXIN 500 MG/1
500 CAPSULE ORAL 3 TIMES DAILY
Qty: 15 CAPSULE | Refills: 0 | Status: SHIPPED | OUTPATIENT
Start: 2019-03-11 | End: 2019-03-16

## 2019-03-08 RX ORDER — GABAPENTIN 300 MG/1
300 CAPSULE ORAL NIGHTLY
Qty: 90 CAPSULE | Refills: 1 | Status: SHIPPED | OUTPATIENT
Start: 2019-03-08 | End: 2019-08-30 | Stop reason: SDUPTHER

## 2019-03-11 ENCOUNTER — HOSPITAL ENCOUNTER (OUTPATIENT)
Age: 79
Setting detail: OUTPATIENT SURGERY
Discharge: HOME | End: 2019-03-11
Attending: PHYSICAL MEDICINE & REHABILITATION | Admitting: PHYSICAL MEDICINE & REHABILITATION
Payer: MEDICARE

## 2019-03-11 ENCOUNTER — APPOINTMENT (OUTPATIENT)
Dept: GENERAL RADIOLOGY | Age: 79
End: 2019-03-11
Attending: PHYSICAL MEDICINE & REHABILITATION
Payer: MEDICARE

## 2019-03-11 VITALS
HEART RATE: 56 BPM | HEIGHT: 64 IN | DIASTOLIC BLOOD PRESSURE: 91 MMHG | WEIGHT: 182 LBS | OXYGEN SATURATION: 96 % | BODY MASS INDEX: 31.07 KG/M2 | SYSTOLIC BLOOD PRESSURE: 119 MMHG | TEMPERATURE: 98 F | RESPIRATION RATE: 16 BRPM

## 2019-03-11 LAB
APTT: 31.6 SEC (ref 26–36)
HCT VFR BLD CALC: 46.2 % (ref 36–48)
HEMOGLOBIN: 15.5 G/DL (ref 12–16)
INR BLD: 0.9 (ref 0.86–1.14)
MCH RBC QN AUTO: 32.6 PG (ref 26–34)
MCHC RBC AUTO-ENTMCNC: 33.5 G/DL (ref 31–36)
MCV RBC AUTO: 97.4 FL (ref 80–100)
PDW BLD-RTO: 13.2 % (ref 12.4–15.4)
PLATELET # BLD: 175 K/UL (ref 135–450)
PMV BLD AUTO: 9.1 FL (ref 5–10.5)
PROTHROMBIN TIME: 10.3 SEC (ref 9.8–13)
RBC # BLD: 4.74 M/UL (ref 4–5.2)
WBC # BLD: 5.4 K/UL (ref 4–11)

## 2019-03-11 PROCEDURE — C1778 LEAD, NEUROSTIMULATOR: HCPCS | Performed by: PHYSICAL MEDICINE & REHABILITATION

## 2019-03-11 PROCEDURE — 72070 X-RAY EXAM THORAC SPINE 2VWS: CPT

## 2019-03-11 PROCEDURE — 3209999900 FLUORO FOR SURGICAL PROCEDURES

## 2019-03-11 PROCEDURE — 85610 PROTHROMBIN TIME: CPT

## 2019-03-11 PROCEDURE — 6360000002 HC RX W HCPCS: Performed by: PHYSICAL MEDICINE & REHABILITATION

## 2019-03-11 PROCEDURE — 99153 MOD SED SAME PHYS/QHP EA: CPT | Performed by: PHYSICAL MEDICINE & REHABILITATION

## 2019-03-11 PROCEDURE — 2709999900 HC NON-CHARGEABLE SUPPLY: Performed by: PHYSICAL MEDICINE & REHABILITATION

## 2019-03-11 PROCEDURE — 3610000059 HC PAIN LEVEL 5 ADDL 15 MIN (NON-OR): Performed by: PHYSICAL MEDICINE & REHABILITATION

## 2019-03-11 PROCEDURE — 36415 COLL VENOUS BLD VENIPUNCTURE: CPT

## 2019-03-11 PROCEDURE — 2500000003 HC RX 250 WO HCPCS: Performed by: PHYSICAL MEDICINE & REHABILITATION

## 2019-03-11 PROCEDURE — 2580000003 HC RX 258: Performed by: PHYSICAL MEDICINE & REHABILITATION

## 2019-03-11 PROCEDURE — 3610000058 HC PAIN LEVEL 5 BASE (NON-OR): Performed by: PHYSICAL MEDICINE & REHABILITATION

## 2019-03-11 PROCEDURE — 99152 MOD SED SAME PHYS/QHP 5/>YRS: CPT | Performed by: PHYSICAL MEDICINE & REHABILITATION

## 2019-03-11 PROCEDURE — 85730 THROMBOPLASTIN TIME PARTIAL: CPT

## 2019-03-11 PROCEDURE — 85027 COMPLETE CBC AUTOMATED: CPT

## 2019-03-11 DEVICE — TRIAL LEAD KIT, 50CM WITH 5MM SPACING
Type: IMPLANTABLE DEVICE | Status: FUNCTIONAL
Brand: NEVRO®

## 2019-03-11 RX ORDER — 0.9 % SODIUM CHLORIDE 0.9 %
VIAL (ML) INJECTION
Status: COMPLETED | OUTPATIENT
Start: 2019-03-11 | End: 2019-03-11

## 2019-03-11 RX ORDER — CEFAZOLIN SODIUM 2 G/100ML
2 INJECTION, SOLUTION INTRAVENOUS ONCE
Status: COMPLETED | OUTPATIENT
Start: 2019-03-11 | End: 2019-03-11

## 2019-03-11 RX ORDER — SODIUM CHLORIDE 9 MG/ML
INJECTION, SOLUTION INTRAVENOUS CONTINUOUS
Status: DISCONTINUED | OUTPATIENT
Start: 2019-03-11 | End: 2019-03-11 | Stop reason: HOSPADM

## 2019-03-11 RX ORDER — MIDAZOLAM HYDROCHLORIDE 1 MG/ML
INJECTION INTRAMUSCULAR; INTRAVENOUS
Status: COMPLETED | OUTPATIENT
Start: 2019-03-11 | End: 2019-03-11

## 2019-03-11 RX ORDER — FENTANYL CITRATE 50 UG/ML
INJECTION, SOLUTION INTRAMUSCULAR; INTRAVENOUS
Status: COMPLETED | OUTPATIENT
Start: 2019-03-11 | End: 2019-03-11

## 2019-03-11 RX ORDER — LIDOCAINE HYDROCHLORIDE 10 MG/ML
INJECTION, SOLUTION EPIDURAL; INFILTRATION; INTRACAUDAL; PERINEURAL
Status: COMPLETED | OUTPATIENT
Start: 2019-03-11 | End: 2019-03-11

## 2019-03-11 RX ADMIN — CEFAZOLIN SODIUM 2 G: 2 INJECTION, SOLUTION INTRAVENOUS at 08:04

## 2019-03-11 RX ADMIN — SODIUM CHLORIDE: 9 INJECTION, SOLUTION INTRAVENOUS at 08:03

## 2019-03-11 ASSESSMENT — PAIN - FUNCTIONAL ASSESSMENT: PAIN_FUNCTIONAL_ASSESSMENT: 0-10

## 2019-03-11 ASSESSMENT — PAIN DESCRIPTION - DESCRIPTORS: DESCRIPTORS: ACHING

## 2019-03-11 ASSESSMENT — PAIN SCALES - GENERAL: PAINLEVEL_OUTOF10: 1

## 2019-03-12 ENCOUNTER — TELEPHONE (OUTPATIENT)
Dept: ORTHOPEDIC SURGERY | Age: 79
End: 2019-03-12

## 2019-03-12 NOTE — TELEPHONE ENCOUNTER
Patient underwent SCS trial on 03/11/19. Today the patient reports low back pain level to be 4/10 and bilateral hip pain level to be 6/10. The patient reports that since the SCS trial, they are able to walk more freely and with less issues. They are also able to stand 2 minutes longer, along with sitting without issues. The patient notes that sleeping well since the trial began. The patient denies any new symptoms such as leg weakness/numbness/tingling. The patient also denies any fevers or chills. The patient reports taking antibiotic 3x daily since procedure. Overall, as of day 1, the patient notes 10% overall improvement.

## 2019-03-15 ENCOUNTER — OFFICE VISIT (OUTPATIENT)
Dept: ORTHOPEDIC SURGERY | Age: 79
End: 2019-03-15

## 2019-03-15 VITALS — HEIGHT: 64 IN | BODY MASS INDEX: 31.09 KG/M2 | WEIGHT: 182.1 LBS

## 2019-03-15 DIAGNOSIS — M47.814 SPONDYLOSIS OF THORACIC REGION WITHOUT MYELOPATHY OR RADICULOPATHY: ICD-10-CM

## 2019-03-15 DIAGNOSIS — M96.1 LUMBAR POST-LAMINECTOMY SYNDROME: Primary | ICD-10-CM

## 2019-03-15 DIAGNOSIS — G89.4 CHRONIC PAIN SYNDROME: ICD-10-CM

## 2019-03-15 PROCEDURE — 99024 POSTOP FOLLOW-UP VISIT: CPT | Performed by: PHYSICAL MEDICINE & REHABILITATION

## 2019-03-18 ENCOUNTER — TELEPHONE (OUTPATIENT)
Dept: ORTHOPEDIC SURGERY | Age: 79
End: 2019-03-18

## 2019-03-29 ENCOUNTER — TELEPHONE (OUTPATIENT)
Dept: CARDIOLOGY CLINIC | Age: 79
End: 2019-03-29

## 2019-04-01 ENCOUNTER — TELEPHONE (OUTPATIENT)
Dept: INTERNAL MEDICINE CLINIC | Age: 79
End: 2019-04-01

## 2019-04-01 NOTE — TELEPHONE ENCOUNTER
Patient requesting a pre-op appointment this week for spinal cord stimulation surgery scheduled 4/9 at [facility] with Dr. Gama Abdul. Please advise. Patient can be reached at phone number provided.

## 2019-04-03 NOTE — PROGRESS NOTES
during my hospitalization, the order may or may not be in effect during this procedure. I give my doctor permission to give me blood or blood products. I understand that there are risks with receiving blood such as hepatitis, AIDS, fever, or allergic reaction. I acknowledge that the risks, benefits, and alternatives of this treatment have been explained to me and that no express or implied warranty has been given by the hospital, any blood bank, or any person or entity as to the blood or blood components transfused. At the discretion of my doctor, I agree to allow observers, equipment/product representatives and allow photographing, and/or televising of the procedure, provided my name or identity is maintained confidentially. I agree the hospital may dispose of or use for scientific or educational purposes any tissue, fluid, or body parts which may be removed.     ________________________________Date________Time______ am/pm  ([place] [facility])  Patient or Signature of Closest Relative or Legal Guardian    ________________________________Date________Time______am/pm      Page 1 of  1  Witness

## 2019-04-04 ENCOUNTER — OFFICE VISIT (OUTPATIENT)
Dept: INTERNAL MEDICINE CLINIC | Age: 79
End: 2019-04-04
Payer: MEDICARE

## 2019-04-04 ENCOUNTER — HOSPITAL ENCOUNTER (OUTPATIENT)
Dept: NON INVASIVE DIAGNOSTICS | Age: 79
Discharge: HOME | End: 2019-04-04
Payer: MEDICARE

## 2019-04-04 VITALS
SYSTOLIC BLOOD PRESSURE: 120 MMHG | RESPIRATION RATE: 12 BRPM | HEIGHT: 64 IN | HEART RATE: 72 BPM | WEIGHT: 185 LBS | BODY MASS INDEX: 31.58 KG/M2 | DIASTOLIC BLOOD PRESSURE: 70 MMHG

## 2019-04-04 DIAGNOSIS — I25.10 CAD IN NATIVE ARTERY: ICD-10-CM

## 2019-04-04 DIAGNOSIS — E78.5 DYSLIPIDEMIA: ICD-10-CM

## 2019-04-04 DIAGNOSIS — N18.30 CKD (CHRONIC KIDNEY DISEASE), STAGE III (HCC): ICD-10-CM

## 2019-04-04 DIAGNOSIS — Z01.818 PREOP EXAM FOR INTERNAL MEDICINE: Primary | ICD-10-CM

## 2019-04-04 DIAGNOSIS — I10 BENIGN ESSENTIAL HTN: ICD-10-CM

## 2019-04-04 LAB
LV EF: 53 %
LVEF MODALITY: NORMAL

## 2019-04-04 PROCEDURE — 99214 OFFICE O/P EST MOD 30 MIN: CPT | Performed by: INTERNAL MEDICINE

## 2019-04-04 PROCEDURE — 93306 TTE W/DOPPLER COMPLETE: CPT

## 2019-04-04 PROCEDURE — G8598 ASA/ANTIPLAT THER USED: HCPCS | Performed by: INTERNAL MEDICINE

## 2019-04-04 PROCEDURE — 1036F TOBACCO NON-USER: CPT | Performed by: INTERNAL MEDICINE

## 2019-04-04 PROCEDURE — 1123F ACP DISCUSS/DSCN MKR DOCD: CPT | Performed by: INTERNAL MEDICINE

## 2019-04-04 PROCEDURE — 1090F PRES/ABSN URINE INCON ASSESS: CPT | Performed by: INTERNAL MEDICINE

## 2019-04-04 PROCEDURE — 4040F PNEUMOC VAC/ADMIN/RCVD: CPT | Performed by: INTERNAL MEDICINE

## 2019-04-04 PROCEDURE — G8427 DOCREV CUR MEDS BY ELIG CLIN: HCPCS | Performed by: INTERNAL MEDICINE

## 2019-04-04 PROCEDURE — G8399 PT W/DXA RESULTS DOCUMENT: HCPCS | Performed by: INTERNAL MEDICINE

## 2019-04-04 PROCEDURE — 93000 ELECTROCARDIOGRAM COMPLETE: CPT | Performed by: INTERNAL MEDICINE

## 2019-04-04 PROCEDURE — G8417 CALC BMI ABV UP PARAM F/U: HCPCS | Performed by: INTERNAL MEDICINE

## 2019-04-04 NOTE — PATIENT INSTRUCTIONS
Stop Aspirin 1 week prior to surgery    Avoid NSAID's (Motrin, Aleve, Advil, Ibuprofen),  vitamin supplements and fish oil 1 week prior to procedure

## 2019-04-04 NOTE — PROGRESS NOTES
[facility]) Physicians  Internal Medicine  Patient Encounter  Kezia Beebe D.O., [facility]          Chief Complaint   Patient presents with    Pre-op Exam       HPI-- 67 y.o. female presents today for a preoperative physical.  Pt diagnosed with post-laminectomy syndrome with associated chronic low back pain. Patient underwent an MRI in the past which showed significant L4-5 spondylolisthesis. She's had prior spine surgery. She was found to have moderate spinal stenosis at L2-3 and multiple levels of neural foraminal stenosis, facet arthropathy and spondylolisthesis. Her pain is now affecting her activities of daily living. She has failed outpatient conservative management including epidural steroid injections and physical therapy. She did a trial of spinal cord stimulator and did very well. She states the results \"were amazing. \"  Pt is now scheduled for a thoracic laminectomy and spinal cord stimulator placement. I have been asked to see patient for pre-operative risk assessment and clearance. Surgery scheduled for 4/9/2019 by Dr. Fiorella Davis    CAD-- Patient denies any chest pain at rest or with exertion. She denies any palpitations, dizziness, lightheadedness, syncope. She denies any increased edema, or orthopnea. She does have SOB with exertion, but not appreciably worse than usual.  She has not been as active due to her pain. She is under the care of Dr. Todd Rendon. H/O PTCA with stent. Her EKG has been abnormal showing Left axis, LVICD (Near LBBB). She is scheduled to have an echo this afternoon. CKD--Patient denies any new problems with foamy or bubbly urine, hematuria. She is under the care of a nephrologist.  She sees a nephrologist.    Lab Results   Component Value Date    BUN 27 (H) 10/05/2018      Lab Results   Component Value Date    CREATININE 1.3 (H) 10/05/2018      HTN-- She states her BP has been doing well. She denies symptoms suggestive of accelerated BP.   She denies HA's, dizziness. She may have transient orthostasis upon standing in the AM.  No syncope. Hyperlipidemia:    Lab Results   Component Value Date    LDLCALC 175 (H) 08/10/2018   Pt is statin intolerant. She does try to eat healthy.          Medical/Surgical Histories     Past Medical History:   Diagnosis Date    Bilateral primary osteoarthritis of hip     CAD (coronary artery disease)     Chronic bilateral low back pain without sciatica 2/8/2018    CKD (chronic kidney disease), stage III (HCC)     DDD (degenerative disc disease), lumbar     Dr. Dwayne Jimenez GERD (gastroesophageal reflux disease)     Headache(784.0)     Heel spur 08/2016    History of lumbar spinal fusion     Hyperlipidemia     Hypertension     IBS (irritable bowel syndrome)     Lyme disease 2009    suspected    MVP (mitral valve prolapse)     Neuroma nos     Osteoarthritis     Osteopenia     Rotator cuff tear     Dr. Oneita Phalen    Squamous cell cancer of skin of right hand     squamous cell carcinoma    Stress incontinence, female     Urinary incontinence       No prior H/O DVT, PE or bleeding dyscrasias    Past Surgical History:   Procedure Laterality Date    BREAST SURGERY      cyst removal    COLONOSCOPY  2006    CORONARY ANGIOPLASTY WITH STENT PLACEMENT      x1    LAMINECTOMY  5/19/14    LUMBAR 4-5 DECOMPRESSIVE LAMINECTOMY AND POSTERIOR LATERAL FUSION LUMBAR 4-5 AND PEDICLE SCREW FIXATION     RI ARTHROCENTESIS ASPIR&/INJ INTERM JT/BURS W/O US N/A 10/24/2018    MIDLINE CAUDEL INJECTION WITH FLUOROSCOPY performed by Herma Mcardle, MD at [facility]  2002    stent, LAD    SPINAL CORD STIMULATOR IMPLANTATION N/A 3/11/2019    SPINAL CORD STIMULATOR IMPLANT TRIAL WITH FLUOROSCOPY WITH NEVRO performed by Herma Mcardle, MD at [facility]      age 10       No reported problems with anesthesia     Medications/Allergies     Current Outpatient Medications   Medication Sig Dispense Refill    gabapentin (NEURONTIN) 300 MG capsule Take 1 capsule by mouth nightly for 180 days. 90 capsule 1    bisoprolol-hydrochlorothiazide (ZIAC) 10-6.25 MG per tablet TAKE 1 TABLET DAILY 90 tablet 1    hydrochlorothiazide (MICROZIDE) 12.5 MG capsule TAKE 1 CAPSULE DAILY 90 capsule 1    traMADol (ULTRAM) 50 MG tablet Take 1 tablet by mouth 4 times daily as needed for Pain for up to 180 days. TAKE 1 TABLET 4 times daily as needed for low back and hip pain. 360 tablet 1    irbesartan (AVAPRO) 150 MG tablet TAKE 1 TABLET DAILY 90 tablet 3    melatonin (RA MELATONIN) 3 MG TABS tablet Take 1 tablet by mouth nightly. 3    Calcium-Vitamin D (CALTRATE 600 PLUS-VIT D PO) Take  by mouth.  Misc Natural Products (OSTEO BI-FLEX TRIPLE STRENGTH PO) Take  by mouth.  acetaminophen (TYLENOL) 325 MG tablet Take 650 mg by mouth every 6 hours as needed for Pain      Omega-3 Fatty Acids (FISH OIL) 1000 MG CAPS Take 3 capsules by mouth daily 90 capsule 3    aspirin 81 MG EC tablet Take 81 mg by mouth daily.  vitamin E 400 UNIT capsule Take 400 Units by mouth daily. No current facility-administered medications for this visit. Allergies   Allergen Reactions    Statins [Statins] Other (See Comments)     Severe muscle pain    Adhesive Tape Itching and Rash         Substance Use History     Social History     Tobacco Use    Smoking status: Never Smoker    Smokeless tobacco: Never Used   Substance Use Topics    Alcohol use: No    Drug use: No          Family History     Family History   Problem Relation Age of Onset    Diabetes Brother     Diabetes Mother     Heart Disease Mother     Heart Disease Father     Cancer Father         skin        No family history of problems with general anesthesia, venous thrombosis, or bleeding dyscrasias.       REVIEW OF SYSTEMS:    CONSTITUTIONAL:  Neg   Recent weight changes, fatigue, fever, chills or night sweats, anorexia, Sleep difficulties  EYES: Neg Blurry vision, loss of vision, double vision, tearing, itching, eye pain. EARS:  Neg Hearing loss, tinnitus, vertigo, discharge or otalgia. NOSE:  Neg  Rhinorrhea, sneezing, itching, allergy, epistaxis, or snoring  MOUTH/THROAT:  Neg Bleeding gums, hoarseness, sore throat, dysphagia, throat infections, or dentures  RESPIRATORY:  Neg SOB ,wheeze, cough, sputum, hemoptysis. No report of + TB test.    CARDIOVASCULAR:  Neg Chest pain, palpitations, heart murmur,  orthopnea, paroxysmal nocturnal dyspnea or edema of extremities, or claudication.  + ZAVALA, no worse than usual.    GASTROINTESTINAL:  Neg   Nausea, vomiting, or diarrhea, constipation hematemesis, heart burn, dysphagia,change in bowel movements or stool caliber, hematochezia, melena, abdominal pain  GENITOURINARY:  Neg  Urinary frequency, hesitancy, urgency, polyuria, dysuria, hematuria, nocturia, incontinence, change in stream, genital pain or swelling, kidney stones  HEMATOLOGIC/LYMPHATIC:  Neg  Anemia, bleeding dyscrasias, easy bruising, blood clots (DVT/PE), transfusions, or enlarged lymph nodes  MUSCULOSKELETAL:  + BL Low back pain and Hip pain. NEUROLOGICAL:  Neg  Loss of Consciousness, memeory loss or forgetfulness, confusion, difficulty concentrating, seizures, insomina, aphasia or dysarthria unilateral weakness or paresthesias, ataxia, headaches, syncope, tremor, or H/O head trauma. PSYCHIATRIC:  Neg  Depression, anxiety  SKIN :  Neg  Rash, nail changes, sun burns, tattoos, change in moles, or skin color changes  ENDOCRINE:  Neg  Polydipsia,polyuria,abnormal weight changes,heat /cold intolerance, Hair changes. No H/O Diabetes or Thyroid disease. Physical Exam    /70   Pulse 72   Resp 12   Ht 5' 4\" (1.626 m)   Wt 185 lb (83.9 kg)   BMI 31.76 kg/m²   GEN:  67 y.o. female who is in NAD, A&O. She appears stated age and well nourished. She is cooperative and pleasant. HEAD:  NC/AT, no lesions.   EYES:  KANG, EOMI, No scleral icterus or conjunctival injection or discharge. Visual fields in tact to confrontation. EARS:  EAC's clear, TM's normal.  NOSE:  Nasal cavity is clear. No mucosal congestion or discharge. Sinuses are nontender. MOUTH & THROAT:  Oral cavity is clear without mucosal lesions. Tongue is midline. Dentition is in good repair. No pharyngeal erythema or exudate. NECK:  Supple. Full ROM. Trachea is midline. No increased JVD. No thyromegaly or nodules. No masses  LYMPH: No C/SC/A/F nodes  CARDIAC:  S1S2 NL. Regular rhythm. No murmur/clicks/rubs. No ectopy. VASC:  Pedal pulses 2/4. Carotid upstrokes 2+. No bruits noted. PULM:  Lungs are CTA. Symmetric breath sounds noted. AP Diameter NL. GI:  Abdomen is soft and nontender. No distension. No organomegaly. No masses. No pulsatile masses. EXT:  No Cyanosis or clubbing. No edema. SKIN: Warm and dry, normal turgor, no rash or lesions of concern. NEURO:  Cranial nerves 2-12 are NL. Speech fluent and coherent. Strength is 5/5 in all muscle groups. No sensory deficits. No focal or lateralizing deficits. Reflexes 2/4 and symmetric. Gait is normal.  MS:  Tenderness with palpation of the lumbar paraspinals. Increased soft tissue tone and spasm noted. ROM is limited in all plains. BL knees with bony hypertrophy and crepitus with ROM. PSYCH:  Mood and affect NL. Judgement and insight NL. Encounter Diagnoses   Name Primary?     Preop exam for internal medicine Yes    Benign essential HTN     CKD (chronic kidney disease), stage III ([address]     CAD in native artery     Dyslipidemia        Plan:  Acceptable risk for the planned procedure pending echocardiogram.  Her cardiologist will need to clear her as well based on the echocardiogram result    EKG-- See report  Lab drawn    Pt will stop ASA 1 week prior to surgery  Pt will avoid NSAID's, OTC vitamin supplements and fish oil 1 week prior to procedure            Electronically Signed:  Aure Shah D.O      Copy of H&P given to pt and sent to Sx

## 2019-04-05 RX ORDER — M-VIT,TX,IRON,MINS/CALC/FOLIC 27MG-0.4MG
1 TABLET ORAL DAILY
COMMUNITY

## 2019-04-05 NOTE — PROGRESS NOTES
you have not been preregistered yet. On the day of your procedure bring your insurance card and photo ID. You will be registered at your bedside once brought back to your room. 5. DO NOT EAT ANYTHING eight hours prior to surgery. May have 8 ounces of water 4 hours prior to surgery. 6. MEDICATIONS    Take the following medications with a SMALL sip of water:    Use your usual dose of inhalers the morning of surgery. BRING your rescue inhaler with you to hospital.    Anesthesia does NOT want you to take insulin the morning of surgery. They will control your blood sugar while you are at the hospital. Please contact your ordering physician for instructions regarding your insulin the night before your procedure. If you have an insulin pump, please keep it set on basal rate. 7. Do not swallow water when brushing teeth. No gum, candy, mints or ice chips. Refrain from smoking or at least decrease the amount. 8. Dress in loose, comfortable clothing appropriate for redressing after your procedure. Do not wear jewelry (including body piercings), make-up (especially NO eye make-up), fingernail polish (NO toenail polish if foot/leg surgery), lotion, powders or metal hairclips. 9. Dentures, glasses, or contacts will need to be removed before your procedure. Bring cases for your glasses, contacts, dentures, or hearing aids to protect them while you are in surgery. 10. If you use a CPAP, please bring it with you on the day of your procedure. 11. We recommend that valuable personal  belongings such as cash, cell phones, e-tablets or jewelry, be left at home during your stay. The hospital will not be responsible for valuables that are not secured in the hospital safe. However, if your insurance requires a co-pay, you may want to bring a method of payment, i.e. Check or credit card, if you wish to pay your co-pay the day of surgery.       12. If you are to stay overnight, you may bring a bag with personal items. Please have any large items you may need brought in by your family after your arrival to your hospital room. 15. If you have a Living Will or Durable Power of , please bring a copy on the day of your procedure. 15. With your permission, one family member may accompany you while you are being prepared for surgery. Once you are ready, additional family members may join you. HOW WE KEEP YOU SAFE and WORK TO PREVENT SURGICAL SITE INFECTIONS:  1. Health care workers should always check your ID bracelet to verify your name and birth date. You will be asked many times to state your name, date of birth, and allergies. 2. Health care workers should always clean their hands with soap or alcohol gel before providing care to you. It is okay to ask anyone if they cleaned their hands before they touch you. 3. You will be actively involved in verifying the type of procedure you are having and ensuring the correct surgical site. This will be confirmed multiple times prior to your procedure. Do NOT tonia your surgery site UNLESS instructed to by your surgeon. 4. Do not shave or wax for 72 hours prior to procedure near your operative site. Shaving with a razor can irritate your skin and make it easier to develop an infection. On the day of your procedure, any hair that needs to be removed near the surgical site will be clipped by a healthcare worker using a special clippers designed to avoid skin irritation. 5. When you are in the operating room, your surgical site will be cleansed with a special soap, and in most cases, you will be given an antibiotic before the surgery begins. What to expect AFTER YOUR PROCEDURE:  1. Immediately following your procedure, your will be taken to the PACU for the first phase of your recovery. Your nurse will help you recover from any potential side effects of anesthesia, such as extreme drowsiness, changes in your vital signs or breathing patterns.  Nausea, headache, muscle aches, or sore throat may also occur after anesthesia. Your nurse will help you manage these potential side effects. 2. For comfort and safety, arrange to have someone at home with you for the first 24 hours after discharge. 3. You and your family will be given written instructions about your diet, activity, dressing care, medications, and return visits. 4. Once at home, should issues with nausea, pain, or bleeding occur, or should you notice any signs of infection, you should call your surgeon. 5. Always clean your hands before and after caring for your wound. Do not let your family touch your surgery site without cleaning their hands. 6. Narcotic pain medications can cause significant constipation. You may want to add a stool softener to your postoperative medication schedule or speak to your surgeon on how best to manage this SIDE EFFECT. SPECIAL INSTRUCTIONS     Thank you for allowing us to care for you. We strive to exceed your expectations in the delivery of care and service provided to you and your family. If you need to contact us for any reason, please call us at [phone]    Instructions reviewed with patient during preadmission testing phone interview. Keren Dunlap. 4/5/2019 .8:29 AM      ADDITIONAL EDUCATIONAL INFORMATION REVIEWED PER PHONE WITH YOU AND/OR YOUR FAMILY:  No Bring a urine sample on day of surgery  Yes Pain Goal-Taking Control of Your Pain  No FAQs about Surgical Site Infections  No Hibiclens® Bathing Instructions   Yes Antibacterial Soap  No Jose® Wipes Bathing Instructions (Obtained from: https://www.Snip.ly.com/. pdf )  No Incentive Spirometer Education  No Other

## 2019-04-08 ENCOUNTER — ANESTHESIA EVENT (OUTPATIENT)
Dept: OPERATING ROOM | Age: 79
End: 2019-04-08
Payer: MEDICARE

## 2019-04-09 ENCOUNTER — APPOINTMENT (OUTPATIENT)
Dept: GENERAL RADIOLOGY | Age: 79
End: 2019-04-09
Attending: NEUROLOGICAL SURGERY
Payer: MEDICARE

## 2019-04-09 ENCOUNTER — HOSPITAL ENCOUNTER (OUTPATIENT)
Age: 79
Setting detail: OUTPATIENT SURGERY
Discharge: HOME | End: 2019-04-09
Attending: NEUROLOGICAL SURGERY | Admitting: NEUROLOGICAL SURGERY
Payer: MEDICARE

## 2019-04-09 ENCOUNTER — ANESTHESIA (OUTPATIENT)
Dept: OPERATING ROOM | Age: 79
End: 2019-04-09
Payer: MEDICARE

## 2019-04-09 VITALS
OXYGEN SATURATION: 99 % | TEMPERATURE: 95.4 F | SYSTOLIC BLOOD PRESSURE: 143 MMHG | RESPIRATION RATE: 10 BRPM | DIASTOLIC BLOOD PRESSURE: 77 MMHG

## 2019-04-09 VITALS
WEIGHT: 185 LBS | OXYGEN SATURATION: 95 % | TEMPERATURE: 97.7 F | HEIGHT: 64 IN | RESPIRATION RATE: 16 BRPM | BODY MASS INDEX: 31.58 KG/M2 | HEART RATE: 67 BPM | DIASTOLIC BLOOD PRESSURE: 91 MMHG | SYSTOLIC BLOOD PRESSURE: 180 MMHG

## 2019-04-09 DIAGNOSIS — G89.29 CHRONIC BILATERAL LOW BACK PAIN WITHOUT SCIATICA: Primary | ICD-10-CM

## 2019-04-09 DIAGNOSIS — M54.50 CHRONIC BILATERAL LOW BACK PAIN WITHOUT SCIATICA: Primary | ICD-10-CM

## 2019-04-09 LAB
APTT: 32.4 SEC (ref 26–36)
INR BLD: 0.99 (ref 0.86–1.14)
PROTHROMBIN TIME: 11.3 SEC (ref 9.8–13)

## 2019-04-09 PROCEDURE — 3600000014 HC SURGERY LEVEL 4 ADDTL 15MIN: Performed by: NEUROLOGICAL SURGERY

## 2019-04-09 PROCEDURE — 85610 PROTHROMBIN TIME: CPT

## 2019-04-09 PROCEDURE — 2709999900 HC NON-CHARGEABLE SUPPLY: Performed by: NEUROLOGICAL SURGERY

## 2019-04-09 PROCEDURE — 2580000003 HC RX 258: Performed by: ANESTHESIOLOGY

## 2019-04-09 PROCEDURE — 2580000003 HC RX 258: Performed by: NEUROLOGICAL SURGERY

## 2019-04-09 PROCEDURE — 2720000010 HC SURG SUPPLY STERILE: Performed by: NEUROLOGICAL SURGERY

## 2019-04-09 PROCEDURE — 2500000003 HC RX 250 WO HCPCS: Performed by: NURSE ANESTHETIST, CERTIFIED REGISTERED

## 2019-04-09 PROCEDURE — C1787 PATIENT PROGR, NEUROSTIM: HCPCS | Performed by: NEUROLOGICAL SURGERY

## 2019-04-09 PROCEDURE — 7100000001 HC PACU RECOVERY - ADDTL 15 MIN: Performed by: NEUROLOGICAL SURGERY

## 2019-04-09 PROCEDURE — 3600000004 HC SURGERY LEVEL 4 BASE: Performed by: NEUROLOGICAL SURGERY

## 2019-04-09 PROCEDURE — 6360000002 HC RX W HCPCS: Performed by: NURSE ANESTHETIST, CERTIFIED REGISTERED

## 2019-04-09 PROCEDURE — 3209999900 FLUORO FOR SURGICAL PROCEDURES

## 2019-04-09 PROCEDURE — C1822 GEN, NEURO, HF, RECHG BAT: HCPCS | Performed by: NEUROLOGICAL SURGERY

## 2019-04-09 PROCEDURE — 2500000003 HC RX 250 WO HCPCS: Performed by: NEUROLOGICAL SURGERY

## 2019-04-09 PROCEDURE — 85730 THROMBOPLASTIN TIME PARTIAL: CPT

## 2019-04-09 PROCEDURE — 72020 X-RAY EXAM OF SPINE 1 VIEW: CPT

## 2019-04-09 PROCEDURE — 7100000011 HC PHASE II RECOVERY - ADDTL 15 MIN: Performed by: NEUROLOGICAL SURGERY

## 2019-04-09 PROCEDURE — 3700000000 HC ANESTHESIA ATTENDED CARE: Performed by: NEUROLOGICAL SURGERY

## 2019-04-09 PROCEDURE — C1778 LEAD, NEUROSTIMULATOR: HCPCS | Performed by: NEUROLOGICAL SURGERY

## 2019-04-09 PROCEDURE — 6360000002 HC RX W HCPCS: Performed by: NEUROLOGICAL SURGERY

## 2019-04-09 PROCEDURE — 7100000000 HC PACU RECOVERY - FIRST 15 MIN: Performed by: NEUROLOGICAL SURGERY

## 2019-04-09 PROCEDURE — 7100000010 HC PHASE II RECOVERY - FIRST 15 MIN: Performed by: NEUROLOGICAL SURGERY

## 2019-04-09 PROCEDURE — 3700000001 HC ADD 15 MINUTES (ANESTHESIA): Performed by: NEUROLOGICAL SURGERY

## 2019-04-09 DEVICE — SENZA®  IPG KIT
Type: IMPLANTABLE DEVICE | Site: HIP | Status: FUNCTIONAL
Brand: SENZA®

## 2019-04-09 DEVICE — SURGICAL LEAD KIT, 70CM
Type: IMPLANTABLE DEVICE | Site: HIP | Status: FUNCTIONAL
Brand: NEVRO®

## 2019-04-09 RX ORDER — SODIUM CHLORIDE, SODIUM LACTATE, POTASSIUM CHLORIDE, CALCIUM CHLORIDE 600; 310; 30; 20 MG/100ML; MG/100ML; MG/100ML; MG/100ML
INJECTION, SOLUTION INTRAVENOUS CONTINUOUS
Status: DISCONTINUED | OUTPATIENT
Start: 2019-04-09 | End: 2019-04-09 | Stop reason: HOSPADM

## 2019-04-09 RX ORDER — FENTANYL CITRATE 50 UG/ML
50 INJECTION, SOLUTION INTRAMUSCULAR; INTRAVENOUS EVERY 5 MIN PRN
Status: CANCELLED | OUTPATIENT
Start: 2019-04-09

## 2019-04-09 RX ORDER — OXYCODONE HYDROCHLORIDE AND ACETAMINOPHEN 5; 325 MG/1; MG/1
2 TABLET ORAL EVERY 6 HOURS PRN
Qty: 40 TABLET | Refills: 0 | Status: SHIPPED | OUTPATIENT
Start: 2019-04-09 | End: 2019-04-14

## 2019-04-09 RX ORDER — FENTANYL CITRATE 50 UG/ML
25 INJECTION, SOLUTION INTRAMUSCULAR; INTRAVENOUS EVERY 5 MIN PRN
Status: CANCELLED | OUTPATIENT
Start: 2019-04-09

## 2019-04-09 RX ORDER — DEXAMETHASONE SODIUM PHOSPHATE 4 MG/ML
INJECTION, SOLUTION INTRA-ARTICULAR; INTRALESIONAL; INTRAMUSCULAR; INTRAVENOUS; SOFT TISSUE PRN
Status: DISCONTINUED | OUTPATIENT
Start: 2019-04-09 | End: 2019-04-09 | Stop reason: SDUPTHER

## 2019-04-09 RX ORDER — DEXAMETHASONE SODIUM PHOSPHATE 4 MG/ML
4 INJECTION, SOLUTION INTRA-ARTICULAR; INTRALESIONAL; INTRAMUSCULAR; INTRAVENOUS; SOFT TISSUE
Status: CANCELLED | OUTPATIENT
Start: 2019-04-09 | End: 2019-04-09

## 2019-04-09 RX ORDER — ROCURONIUM BROMIDE 10 MG/ML
INJECTION, SOLUTION INTRAVENOUS PRN
Status: DISCONTINUED | OUTPATIENT
Start: 2019-04-09 | End: 2019-04-09 | Stop reason: SDUPTHER

## 2019-04-09 RX ORDER — FENTANYL CITRATE 50 UG/ML
INJECTION, SOLUTION INTRAMUSCULAR; INTRAVENOUS PRN
Status: DISCONTINUED | OUTPATIENT
Start: 2019-04-09 | End: 2019-04-09 | Stop reason: SDUPTHER

## 2019-04-09 RX ORDER — EPHEDRINE SULFATE 50 MG/ML
INJECTION, SOLUTION INTRAVENOUS PRN
Status: DISCONTINUED | OUTPATIENT
Start: 2019-04-09 | End: 2019-04-09 | Stop reason: SDUPTHER

## 2019-04-09 RX ORDER — LABETALOL HYDROCHLORIDE 5 MG/ML
5 INJECTION, SOLUTION INTRAVENOUS EVERY 10 MIN PRN
Status: CANCELLED | OUTPATIENT
Start: 2019-04-09

## 2019-04-09 RX ORDER — PROPOFOL 10 MG/ML
INJECTION, EMULSION INTRAVENOUS PRN
Status: DISCONTINUED | OUTPATIENT
Start: 2019-04-09 | End: 2019-04-09 | Stop reason: SDUPTHER

## 2019-04-09 RX ORDER — HYDRALAZINE HYDROCHLORIDE 20 MG/ML
5 INJECTION INTRAMUSCULAR; INTRAVENOUS EVERY 10 MIN PRN
Status: CANCELLED | OUTPATIENT
Start: 2019-04-09

## 2019-04-09 RX ORDER — ONDANSETRON 2 MG/ML
INJECTION INTRAMUSCULAR; INTRAVENOUS PRN
Status: DISCONTINUED | OUTPATIENT
Start: 2019-04-09 | End: 2019-04-09 | Stop reason: SDUPTHER

## 2019-04-09 RX ORDER — MEPERIDINE HYDROCHLORIDE 25 MG/ML
12.5 INJECTION INTRAMUSCULAR; INTRAVENOUS; SUBCUTANEOUS EVERY 5 MIN PRN
Status: CANCELLED | OUTPATIENT
Start: 2019-04-09

## 2019-04-09 RX ADMIN — DEXAMETHASONE SODIUM PHOSPHATE 4 MG: 4 INJECTION, SOLUTION INTRAMUSCULAR; INTRAVENOUS at 14:15

## 2019-04-09 RX ADMIN — PROPOFOL 150 MG: 10 INJECTION, EMULSION INTRAVENOUS at 13:41

## 2019-04-09 RX ADMIN — ROCURONIUM BROMIDE 10 MG: 10 INJECTION, SOLUTION INTRAVENOUS at 14:13

## 2019-04-09 RX ADMIN — PROPOFOL 50 MG: 10 INJECTION, EMULSION INTRAVENOUS at 14:13

## 2019-04-09 RX ADMIN — EPHEDRINE SULFATE 10 MG: 50 INJECTION, SOLUTION INTRAMUSCULAR; INTRAVENOUS; SUBCUTANEOUS at 14:48

## 2019-04-09 RX ADMIN — ROCURONIUM BROMIDE 40 MG: 10 INJECTION, SOLUTION INTRAVENOUS at 13:41

## 2019-04-09 RX ADMIN — Medication 2 G: at 13:59

## 2019-04-09 RX ADMIN — FENTANYL CITRATE 50 MCG: 50 INJECTION INTRAMUSCULAR; INTRAVENOUS at 14:13

## 2019-04-09 RX ADMIN — FENTANYL CITRATE 25 MCG: 50 INJECTION INTRAMUSCULAR; INTRAVENOUS at 13:32

## 2019-04-09 RX ADMIN — SODIUM CHLORIDE, SODIUM LACTATE, POTASSIUM CHLORIDE, AND CALCIUM CHLORIDE: 600; 310; 30; 20 INJECTION, SOLUTION INTRAVENOUS at 13:32

## 2019-04-09 RX ADMIN — ONDANSETRON 4 MG: 2 INJECTION INTRAMUSCULAR; INTRAVENOUS at 15:22

## 2019-04-09 RX ADMIN — SODIUM CHLORIDE, SODIUM LACTATE, POTASSIUM CHLORIDE, AND CALCIUM CHLORIDE: 600; 310; 30; 20 INJECTION, SOLUTION INTRAVENOUS at 11:00

## 2019-04-09 RX ADMIN — SUGAMMADEX 200 MG: 100 INJECTION, SOLUTION INTRAVENOUS at 15:23

## 2019-04-09 RX ADMIN — LIDOCAINE HYDROCHLORIDE 80 MG: 20 INJECTION, SOLUTION INTRAVENOUS at 13:41

## 2019-04-09 ASSESSMENT — PULMONARY FUNCTION TESTS
PIF_VALUE: 1
PIF_VALUE: 22
PIF_VALUE: 22
PIF_VALUE: 23
PIF_VALUE: 24
PIF_VALUE: 18
PIF_VALUE: 22
PIF_VALUE: 21
PIF_VALUE: 22
PIF_VALUE: 36
PIF_VALUE: 23
PIF_VALUE: 19
PIF_VALUE: 1
PIF_VALUE: 1
PIF_VALUE: 22
PIF_VALUE: 21
PIF_VALUE: 22
PIF_VALUE: 19
PIF_VALUE: 23
PIF_VALUE: 23
PIF_VALUE: 21
PIF_VALUE: 21
PIF_VALUE: 22
PIF_VALUE: 21
PIF_VALUE: 19
PIF_VALUE: 19
PIF_VALUE: 1
PIF_VALUE: 22
PIF_VALUE: 22
PIF_VALUE: 34
PIF_VALUE: 23
PIF_VALUE: 22
PIF_VALUE: 23
PIF_VALUE: 1
PIF_VALUE: 2
PIF_VALUE: 3
PIF_VALUE: 22
PIF_VALUE: 19
PIF_VALUE: 19
PIF_VALUE: 23
PIF_VALUE: 23
PIF_VALUE: 22
PIF_VALUE: 20
PIF_VALUE: 22
PIF_VALUE: 0
PIF_VALUE: 20
PIF_VALUE: 19
PIF_VALUE: 21
PIF_VALUE: 20
PIF_VALUE: 23
PIF_VALUE: 25
PIF_VALUE: 19
PIF_VALUE: 21
PIF_VALUE: 8
PIF_VALUE: 22
PIF_VALUE: 21
PIF_VALUE: 21
PIF_VALUE: 22
PIF_VALUE: 19
PIF_VALUE: 22
PIF_VALUE: 22
PIF_VALUE: 21
PIF_VALUE: 26
PIF_VALUE: 23
PIF_VALUE: 22
PIF_VALUE: 19
PIF_VALUE: 23
PIF_VALUE: 22
PIF_VALUE: 23
PIF_VALUE: 21
PIF_VALUE: 3
PIF_VALUE: 22
PIF_VALUE: 23
PIF_VALUE: 19
PIF_VALUE: 22
PIF_VALUE: 22
PIF_VALUE: 19
PIF_VALUE: 22
PIF_VALUE: 5
PIF_VALUE: 22
PIF_VALUE: 5
PIF_VALUE: 23
PIF_VALUE: 20
PIF_VALUE: 22
PIF_VALUE: 22
PIF_VALUE: 23
PIF_VALUE: 23
PIF_VALUE: 22
PIF_VALUE: 21
PIF_VALUE: 22
PIF_VALUE: 22
PIF_VALUE: 21
PIF_VALUE: 22
PIF_VALUE: 23
PIF_VALUE: 23
PIF_VALUE: 21
PIF_VALUE: 23
PIF_VALUE: 23
PIF_VALUE: 19
PIF_VALUE: 23
PIF_VALUE: 31
PIF_VALUE: 21
PIF_VALUE: 22
PIF_VALUE: 21
PIF_VALUE: 21
PIF_VALUE: 25

## 2019-04-09 ASSESSMENT — PAIN SCALES - GENERAL
PAINLEVEL_OUTOF10: 0
PAINLEVEL_OUTOF10: 0

## 2019-04-09 NOTE — H&P
Taryn Carter    [id number]    [facility] Same Day Surgery Update H & P  Department of General Surgery   Surgical Service   Physician Assistant Pre-operative History and Physical  Last H & P within the last 30 days. DIAGNOSIS:   POSTLAMINECTOMY SYNDROME    PROCEDURE:  VA LAMINECTOMY,>2 SGMT,THORACIC [18441] (THORACIC LAMINECTOMY POSTERIOR)     HISTORY OF PRESENT ILLNESS:   Please see initial H & P    Patient has back pain with sciatica symptoms. Symptoms are unresolved with conservative treatments.      Past Medical History:        Diagnosis Date    Bilateral primary osteoarthritis of hip     CAD (coronary artery disease)     Chronic bilateral low back pain without sciatica 2/8/2018    CKD (chronic kidney disease), stage III (HCC)     DDD (degenerative disc disease), lumbar     Dr. Ankur Sow    GERD (gastroesophageal reflux disease)     Headache(784.0)     Heel spur 08/2016    History of lumbar spinal fusion     Hyperlipidemia     Hypertension     IBS (irritable bowel syndrome)     Lyme disease 2009    suspected    MVP (mitral valve prolapse)     Neuroma nos     Osteoarthritis     Osteopenia     Rotator cuff tear     Dr. Karolina Lafleur    Squamous cell cancer of skin of right hand     squamous cell carcinoma    Stress incontinence, female     Urinary incontinence      Past Surgical History:        Procedure Laterality Date    BREAST SURGERY      cyst removal    COLONOSCOPY  2006    CORONARY ANGIOPLASTY WITH STENT PLACEMENT      x1    LAMINECTOMY  5/19/14    LUMBAR 4-5 DECOMPRESSIVE LAMINECTOMY AND POSTERIOR LATERAL FUSION LUMBAR 4-5 AND PEDICLE SCREW FIXATION     VA ARTHROCENTESIS ASPIR&/INJ INTERM JT/BURS W/O US N/A 10/24/2018    MIDLINE CAUDEL INJECTION WITH FLUOROSCOPY performed by Vanna Ferrer MD at [place] [address]  2002    stent, LAD    2000 [facility] N/A 3/11/2019    SPINAL CORD STIMULATOR IMPLANT TRIAL WITH FLUOROSCOPY WITH KRISTEN performed by Priya Penn MD at [address]      age 10     Past Social History:  Social History     Socioeconomic History    Marital status:      Spouse name: None    Number of children: None    Years of education: None    Highest education level: None   Occupational History    None   Social Needs    Financial resource strain: None    Food insecurity:     Worry: None     Inability: None    Transportation needs:     Medical: None     Non-medical: None   Tobacco Use    Smoking status: Never Smoker    Smokeless tobacco: Never Used   Substance and Sexual Activity    Alcohol use: No    Drug use: No    Sexual activity: None   Lifestyle    Physical activity:     Days per week: None     Minutes per session: None    Stress: None   Relationships    Social connections:     Talks on phone: None     Gets together: None     Attends [employer] service: None     Active member of club or organization: None     Attends meetings of clubs or organizations: None     Relationship status: None    Intimate partner violence:     Fear of current or ex partner: None     Emotionally abused: None     Physically abused: None     Forced sexual activity: None   Other Topics Concern    None   Social History Narrative    None         Medications Prior to Admission:      Prior to Admission medications    Medication Sig Start Date End Date Taking? Authorizing Provider   Multiple Vitamins-Minerals (THERAPEUTIC MULTIVITAMIN-MINERALS) tablet Take 1 tablet by mouth daily   Yes Historical Provider, MD   gabapentin (NEURONTIN) 300 MG capsule Take 1 capsule by mouth nightly for 180 days. 3/8/19 9/4/19 Yes Hill James DO   bisoprolol-hydrochlorothiazide (ZIAC) 10-6.25 MG per tablet TAKE 1 TABLET DAILY 1/28/19  Yes Hill James DO   hydrochlorothiazide (MICROZIDE) 12.5 MG capsule TAKE 1 CAPSULE DAILY 1/28/19  Yes Hill James DO   traMADol (ULTRAM) 50 MG tablet Take 1 tablet by mouth 4 times daily as needed for Pain for up to 180 days.  TAKE 1 TABLET 4 times daily as needed for low back and hip pain. 11/1/18 4/30/19 Yes Hill James DO   irbesartan (AVAPRO) 150 MG tablet TAKE 1 TABLET DAILY 9/4/18  Yes Hill James DO   acetaminophen (TYLENOL) 325 MG tablet Take 650 mg by mouth every 6 hours as needed for Pain   Yes Historical Provider, MD   Omega-3 Fatty Acids (FISH OIL) 1000 MG CAPS Take 3 capsules by mouth daily 7/22/15  Yes Hill James DO   melatonin (RA MELATONIN) 3 MG TABS tablet Take 1 tablet by mouth nightly. 9/17/14  Yes Hill James DO   Calcium-Vitamin D (CALTRATE 600 PLUS-VIT D PO) Take  by mouth. Yes Historical Provider, MD   Misc Natural Products (OSTEO BI-FLEX TRIPLE STRENGTH PO) Take  by mouth. Yes Historical Provider, MD   vitamin E 400 UNIT capsule Take 400 Units by mouth daily. Yes Historical Provider, MD   aspirin 81 MG EC tablet Take 81 mg by mouth daily. Historical Provider, MD         Allergies:  Statins [statins] and Adhesive tape    PHYSICAL EXAM:      BP (!) 152/85   Pulse 72   Temp 97.5 °F (36.4 °C) (Oral)   Resp 18   Ht 5' 4\" (1.626 m)   Wt 185 lb (83.9 kg)   SpO2 95%   BMI 31.76 kg/m²      Heart:  regular rate and rhythm, no murmur    Lungs:  No increased work of breathing, good air exchange, clear to auscultation bilaterally, no crackles or wheezing    Abdomen:  soft, non-distended, non-tender, no rebound tenderness or guarding, normal active bowel sounds and no masses palpated    ASSESSMENT AND PLAN:    1. Patient seen and focused exam done today- no new changes since last physical exam on 4-4-2019    2. Access to ancillary services are available per request of the provider.     Kb Rivero     4/9/2019

## 2019-04-09 NOTE — PROGRESS NOTES
Patient admitted to PACU #16 from OR per stretcher at 1535 s/p THORACIC LAMINECTOMY WITH PADDLE LEAD AND RECHARGEBLE BATTERY IN RIGHT HIP (Right Hip). Report received at bedside in PACU per CRNA. Patient was reported to by hypotensive in OR during surgery which she was medicated for, see anesthesia record. Patient connected to PACU monitoring equipment. IVF's infusing with site unremarkable. Patient arrived to PACU responsive but not fully wakeful from anesthesia but with respirations easy, even and regular. No pain noted or verbalized. Right posterior hip surgical dressing remains DANIEL with surgical glue with site unremarkable. No further changes noted. Will continue to monitor.

## 2019-04-09 NOTE — PROGRESS NOTES
Pt to SDS alert; oriented X 4; speech clear; breathing easily on RA; denies any pain at rest; states \"only with movement. \" Two IV's placed in urbano hands and blood drawn and sent to lab; Representative from [facility] for device placement has been to bedside and discussed device with pt and pt's daughter-in-law. Pt's nostrils swabbed with 3-M swab per Dr. Ania Jama orders; Regarding betablocker, pt's last dose of her combo pill bisoprolol/hctz was yesterday morning; pt did not take today because of the HCTZ per pt statement. Aubree Gaspar called pharmacy to ask for only bisoprolol, but pill not available.   Then spoke with Dr. Lyndsey Ingram regarding the betablocker, and MD stated that pt would receive IV betablocker in the OR, and none would be given in preop area; information noted in the preop checklist.  Ancef 2 g IVPB will go with pt to OR; pt has family at bedside;

## 2019-04-09 NOTE — ANESTHESIA POSTPROCEDURE EVALUATION
Department of Anesthesiology  Postprocedure Note    Patient: Emmie Durbin  MRN: [id number]  YOB: 1940  Date of evaluation: 4/9/2019  Time:  5:01 PM     Procedure Summary     Date:  04/09/19 [facility] / [facility]    Anesthesia Start:  1332 Anesthesia Stop:  53-69-10-18    Procedure:  THORACIC LAMINECTOMY WITH PADDLE LEAD AND RECHARGEBLE BATTERY IN RIGHT HIP (Right Hip) Diagnosis:  (POSTLAMINECTOMY SYNDROME)    Surgeon:  Carol Torres MD Responsible Provider:  Yousuf Steele MD    Anesthesia Type:  general ASA Status:  3          Anesthesia Type: general    Lucie Phase I: Lucie Score: 10    Lucie Phase II:      Last vitals: Reviewed and per EMR flowsheets.        Anesthesia Post Evaluation    Patient location during evaluation: PACU  Patient participation: complete - patient participated  Level of consciousness: awake and alert  Pain score: 0  Airway patency: patent  Nausea & Vomiting: no nausea and no vomiting  Complications: no  Cardiovascular status: hemodynamically stable  Respiratory status: acceptable  Hydration status: euvolemic

## 2019-04-09 NOTE — PROGRESS NOTES
PACU Transfer to [facility]    Vitals:    04/09/19 1645   BP: (!) 165/83   Pulse: 70   Resp: 15   Temp: 97.5 °F (36.4 °C)   SpO2: 97%         Intake/Output Summary (Last 24 hours) at 4/9/2019 1655  Last data filed at 4/9/2019 1645  Gross per 24 hour   Intake 1655 ml   Output --   Net 1655 ml       Pain assessment:  none  Pain Level: 0    Patient transferred to care of [facility] RN. Patient meets jessica discharge criteria from PACU.  [facility] aware of patients blood pressure and stated patient needs to take her medication when she gets home since she hasn't had it today. Family waiting informed of patient going to Same day.      4/9/2019 4:55 PM

## 2019-04-09 NOTE — PROGRESS NOTES
Ambulatory Surgery/Procedure Discharge Note    Vitals:    04/09/19 1700   BP: (!) 180/91   Pulse: 67   Resp: 16   Temp: 97.7 °F (36.5 °C)   SpO2: 95%       In: 1655 [P.O.:30; I.V.:1625]  Out: -     Restroom use offered before discharge. yes    Pain assessment:   Pain Level: 0    Discharge instructions along with medication instructions and side effects given to patient and . Verbalized understanding. Tolerating po fluids. Denies nausea, pain controlled. Feels ready to go home. IV's x 2 removed without complication. Patient discharged to home/self care.  Patient discharged via wheel chair by transporter to waiting family/S.O.       4/9/2019 5:28 PM

## 2019-04-09 NOTE — ANESTHESIA POSTPROCEDURE EVALUATION
Department of Anesthesiology  Postprocedure Note    Patient: Chevy Mock  MRN: [id number]  YOB: 1940  Date of evaluation: 4/9/2019  Time:  5:02 PM     Procedure Summary     Date:  04/09/19 Room / Location:  [address] [place], [place] [address]e N OR 05 / [address] [facility] [address] OR    Anesthesia Start:  1332 Anesthesia Stop:  8445    Procedure:  THORACIC LAMINECTOMY WITH PADDLE LEAD AND RECHARGEBLE BATTERY IN RIGHT HIP (Right Hip) Diagnosis:  (POSTLAMINECTOMY SYNDROME)    Surgeon:  Zackery Jenkins MD Responsible Provider:  Sofie Wilkinson MD    Anesthesia Type:  general ASA Status:  3          Anesthesia Type: general    Lucie Phase I: Lucie Score: 10    Lucie Phase II:      Last vitals: Reviewed and per EMR flowsheets.        Anesthesia Post Evaluation    Patient location during evaluation: PACU  Patient participation: complete - patient participated  Level of consciousness: awake and alert  Pain score: 0  Airway patency: patent  Nausea & Vomiting: no nausea and no vomiting  Complications: no  Cardiovascular status: hemodynamically stable  Respiratory status: acceptable  Hydration status: euvolemic

## 2019-04-09 NOTE — ANESTHESIA PRE PROCEDURE
Department of Anesthesiology  Preprocedure Note       Name:  Danette Barnes   Age:  67 y.o.  :  1940                                          MRN:  [id number]         Date:  2019      Surgeon: Daryl Justin):  Orly Love MD    Procedure: THORACIC LAMINECTOMY WITH PADDLE LEAD AND RECHARGEBLE BATTERY IN RIGHT HIP (Right Hip)    Medications prior to admission:   Prior to Admission medications    Medication Sig Start Date End Date Taking? Authorizing Provider   Multiple Vitamins-Minerals (THERAPEUTIC MULTIVITAMIN-MINERALS) tablet Take 1 tablet by mouth daily   Yes Historical Provider, MD   gabapentin (NEURONTIN) 300 MG capsule Take 1 capsule by mouth nightly for 180 days. 3/8/19 9/4/19 Yes Hill James DO   bisoprolol-hydrochlorothiazide (ZIAC) 10-6.25 MG per tablet TAKE 1 TABLET DAILY 19  Yes Hill James DO   hydrochlorothiazide (MICROZIDE) 12.5 MG capsule TAKE 1 CAPSULE DAILY 19  Yes Hill James DO   traMADol (ULTRAM) 50 MG tablet Take 1 tablet by mouth 4 times daily as needed for Pain for up to 180 days. TAKE 1 TABLET 4 times daily as needed for low back and hip pain. 18 Yes Hill James DO   irbesartan (AVAPRO) 150 MG tablet TAKE 1 TABLET DAILY 18  Yes Hill James DO   acetaminophen (TYLENOL) 325 MG tablet Take 650 mg by mouth every 6 hours as needed for Pain   Yes Historical Provider, MD   Omega-3 Fatty Acids (FISH OIL) 1000 MG CAPS Take 3 capsules by mouth daily 7/22/15  Yes Hill James DO   melatonin (RA MELATONIN) 3 MG TABS tablet Take 1 tablet by mouth nightly. 14  Yes Hill James DO   Calcium-Vitamin D (CALTRATE 600 PLUS-VIT D PO) Take  by mouth. Yes Historical Provider, MD   Misc Natural Products (OSTEO BI-FLEX TRIPLE STRENGTH PO) Take 1 tablet by mouth 2 times daily    Yes Historical Provider, MD   aspirin 81 MG EC tablet Take 81 mg by mouth daily.    Yes Historical Provider, MD   vitamin E 400 UNIT capsule Take 400 Units by mouth daily. Yes Historical Provider, MD       Current medications:    Current Facility-Administered Medications   Medication Dose Route Frequency Provider Last Rate Last Dose    ceFAZolin (ANCEF) 2 g in dextrose 5 % 50 mL IVPB  2 g Intravenous Once Orly Love MD        lactated ringers infusion   Intravenous Continuous Wilman Silva  mL/hr at 04/09/19 1100         Allergies:     Allergies   Allergen Reactions    Statins [Statins] Other (See Comments)     Severe muscle pain    Adhesive Tape Itching and Rash     Pt Okay with paper tape       Problem List:    Patient Active Problem List   Diagnosis Code    Stress incontinence, female N39.3    OA (osteoarthritis) of hip M16.9    Benign essential HTN I5    CAD in native artery I25.10    DDD (degenerative disc disease), lumbar M51.36    CKD (chronic kidney disease), stage III ([address] N18.3    Statin intolerance Z78.9    Dyslipidemia E78.5    Osteopenia M85.80    Chronic bilateral low back pain without sciatica M54.5, G89.29    Spinal stenosis of lumbar region without neurogenic claudication M48.061    Squamous cell cancer of skin of right hand C44.622       Past Medical History:        Diagnosis Date    Bilateral primary osteoarthritis of hip     CAD (coronary artery disease)     one stent in heart    Chronic bilateral low back pain without sciatica 2/8/2018    CKD (chronic kidney disease), stage III ([facility])     DDD (degenerative disc disease), lumbar     Dr. Verna Bullock Dr. Emmalene Fothergill GERD (gastroesophageal reflux disease)     Headache(784.0)     Heel spur 08/2016    History of lumbar spinal fusion     Hyperlipidemia     Hypertension     IBS (irritable bowel syndrome)     Lyme disease 2009    suspected    MVP (mitral valve prolapse)     Neuroma nos     Osteoarthritis     Osteopenia     Rotator cuff tear     Dr. Maryanne Etienne    Squamous cell cancer of skin of right hand     squamous cell carcinoma    Stress incontinence, female     Urinary incontinence        Past Surgical History:        Procedure Laterality Date    BREAST SURGERY      cyst removal    COLONOSCOPY  2006    CORONARY ANGIOPLASTY WITH STENT PLACEMENT      x1    LAMINECTOMY  5/19/14    LUMBAR 4-5 DECOMPRESSIVE LAMINECTOMY AND POSTERIOR LATERAL FUSION LUMBAR 4-5 AND PEDICLE SCREW FIXATION     UT ARTHROCENTESIS ASPIR&/INJ INTERM JT/BURS W/O US N/A 10/24/2018    MIDLINE CAUDEL INJECTION WITH FLUOROSCOPY performed by Juan Yates MD at [facility]  2002    stent, LAD    SPINAL CORD STIMULATOR IMPLANTATION N/A 3/11/2019    SPINAL CORD STIMULATOR IMPLANT TRIAL WITH FLUOROSCOPY WITH NEVRO performed by Juan Yates MD at [address] [facility]      age 10       Social History:    Social History     Tobacco Use    Smoking status: Never Smoker    Smokeless tobacco: Never Used   Substance Use Topics    Alcohol use: No                                Counseling given: Not Answered      Vital Signs (Current):   Vitals:    04/05/19 0824 04/09/19 1029   BP:  (!) 152/85   Pulse:  72   Resp:  18   Temp:  97.5 °F (36.4 °C)   TempSrc:  Oral   SpO2:  95%   Weight: 185 lb (83.9 kg) 185 lb (83.9 kg)   Height: 5' 4\" (1.626 m) 5' 4\" (1.626 m)                                              BP Readings from Last 3 Encounters:   04/09/19 (!) 152/85   04/04/19 120/70   03/11/19 (!) 119/91       NPO Status: Time of last liquid consumption: 0900                        Time of last solid consumption: 2300                        Date of last liquid consumption: 04/09/19                        Date of last solid food consumption: 04/08/19    BMI:   Wt Readings from Last 3 Encounters:   04/09/19 185 lb (83.9 kg)   04/04/19 185 lb (83.9 kg)   03/15/19 182 lb 1.6 oz (82.6 kg)     Body mass index is 31.76 kg/m².     CBC:   Lab Results   Component Value Date    WBC 3.7 04/04/2019    RBC 4.74 04/04/2019    HGB 15.5 04/04/2019    HCT 46.8 04/04/2019    MCV 98.9 04/04/2019 RDW 13.4 04/04/2019     04/04/2019       CMP:   Lab Results   Component Value Date     04/04/2019    K 4.2 04/04/2019     04/04/2019    CO2 28 04/04/2019    BUN 25 04/04/2019    CREATININE 1.0 04/04/2019    GFRAA >60 04/04/2019    GFRAA 57 03/19/2013    AGRATIO 1.3 08/10/2018    LABGLOM 54 04/04/2019    GLUCOSE 93 04/04/2019    PROT 7.2 08/10/2018    PROT 7.1 03/19/2013    CALCIUM 9.3 04/04/2019    BILITOT 0.4 08/10/2018    ALKPHOS 64 08/10/2018    AST 27 08/10/2018    ALT 20 08/10/2018       POC Tests: No results for input(s): POCGLU, POCNA, POCK, POCCL, POCBUN, POCHEMO, POCHCT in the last 72 hours. Coags:   Lab Results   Component Value Date    PROTIME 11.3 04/09/2019    INR 0.99 04/09/2019    APTT 32.4 04/09/2019       HCG (If Applicable): No results found for: PREGTESTUR, PREGSERUM, HCG, HCGQUANT     ABGs: No results found for: PHART, PO2ART, CYX3DKH, QCO6KCH, BEART, M0LLRIBA     Type & Screen (If Applicable):  No results found for: LABABO, LABRH    Anesthesia Evaluation    Airway: Mallampati: III  TM distance: >3 FB   Neck ROM: limited  Mouth opening: > = 3 FB Dental: normal exam         Pulmonary:normal exam  breath sounds clear to auscultation                             Cardiovascular:          ECG reviewed  Rhythm: regular  Rate: normal  Echocardiogram reviewed  Stress test reviewed                Neuro/Psych:               GI/Hepatic/Renal:             Endo/Other:                     Abdominal:           Vascular:                                        Anesthesia Plan      general     ASA 3       Induction: intravenous. MIPS: Postoperative opioids intended and Prophylactic antiemetics administered. Anesthetic plan and risks discussed with patient.         Attending anesthesiologist reviewed and agrees with Danae Mathis MD   4/9/2019

## 2019-04-10 NOTE — OP NOTE
[address] [facility]               [address] [place], [address]                                OPERATIVE REPORT    PATIENT NAME: Payton Carrillo               :        1940  MED REC NO:   [id number]                          ROOM:  ACCOUNT NO:   [de-identified]                           ADMIT DATE: 2019  PROVIDER:     Axel Mcrae MD    DATE OF PROCEDURE:  2019    PREOPERATIVE DIAGNOSIS:  Medically intractable back pain, failed back  syndrome. POSTOPERATIVE DIAGNOSIS:  Medically intractable back pain, failed back  syndrome. OPERATION PERFORMED:  Thoracic laminectomy, placement of paddle lead,  placement of rechargeable battery in the left hip area. OPERATING SURGEON:  Axel Mcrae M.D. ANESTHESIA:  General endotracheal.    ESTIMATED BLOOD LOSS:  Less than 20 mL. INDICATIONS:  The patient is a 67-year-old female with history of failed  back syndrome, underwent a successful spinal cord stimulator trial.  The  patient understood the procedure, risks, and benefits and agreed to  proceed in this fashion. PROCEDURE IN DETAIL:  The patient was brought to the operating room. General endotracheal anesthesia was induced. The area of the back was  prepped out and draped in sterile fashion as well as the left hip. Incision was then infiltrated with local anesthetic, incised with a 10  blade, carried down to the bone using Bovie cautery. Subperiosteal  dissection was then performed. Fluoroscopy was used to confirm proper  position of T10, T11 interspace. Partial removal of the spinous process  and ligament were performed using [place]ell rongeur and Kerrison punch. Partial laminotomy was then performed with Kerrison punch rongeurs.    Epidural dissection was then performed and placement of 2 x 8 electrode  was partial midline due to the patient's severe scoliosis and length of  the paddle lead, it was impossible to maintain perfect midline  throughout the curved spine. The majority of the electrode appeared to  be midline and slightly left paracentric. This seemed adequate. Incision made in the left hip, sized for the battery Senza I. Tunneling  made, connections made. Intraoperative analysis demonstrated no  problems with any of the connections. Wounds were irrigated,  hemostased, and closed. Hemostasis was acquired using Bovie bipolar  cautery, FloSeal and bone wax. No problems were noted. No excessive  bleeding noted. Wounds closed in multiple layers, two interrupted  Vicryl sutures for the deep layers, three interrupted 4-0 Monocryl, and  Dermabond for the skin. No issues were noted. The patient was then  reversed from anesthesia, extubated, taken to recovery room in good  stable condition. No complications noted. I certify that I was present  and available for the entire procedure.         Maia Ramachandran MD    D: 04/09/2019 18:53:50       T: 04/09/2019 21:58:12     [email or web address]  Job#: [id number]     Doc#: [id number]    CC:

## 2019-05-09 DIAGNOSIS — M54.50 CHRONIC BILATERAL LOW BACK PAIN WITHOUT SCIATICA: Primary | ICD-10-CM

## 2019-05-09 DIAGNOSIS — G89.29 CHRONIC BILATERAL LOW BACK PAIN WITHOUT SCIATICA: Primary | ICD-10-CM

## 2019-05-09 DIAGNOSIS — M25.552 BILATERAL HIP PAIN: ICD-10-CM

## 2019-05-09 DIAGNOSIS — M25.551 BILATERAL HIP PAIN: ICD-10-CM

## 2019-05-09 DIAGNOSIS — M48.061 SPINAL STENOSIS OF LUMBAR REGION WITHOUT NEUROGENIC CLAUDICATION: ICD-10-CM

## 2019-05-09 DIAGNOSIS — M47.816 LUMBAR FACET ARTHROPATHY: ICD-10-CM

## 2019-05-09 RX ORDER — TRAMADOL HYDROCHLORIDE 50 MG/1
50 TABLET ORAL EVERY 6 HOURS PRN
Qty: 360 TABLET | Refills: 0 | Status: SHIPPED | OUTPATIENT
Start: 2019-05-09 | End: 2019-08-19 | Stop reason: SDUPTHER

## 2019-07-19 ENCOUNTER — OFFICE VISIT (OUTPATIENT)
Dept: INTERNAL MEDICINE CLINIC | Age: 79
End: 2019-07-19
Payer: MEDICARE

## 2019-07-19 VITALS
RESPIRATION RATE: 14 BRPM | SYSTOLIC BLOOD PRESSURE: 132 MMHG | OXYGEN SATURATION: 95 % | DIASTOLIC BLOOD PRESSURE: 88 MMHG | HEART RATE: 71 BPM | BODY MASS INDEX: 31.86 KG/M2 | HEIGHT: 64 IN | WEIGHT: 186.6 LBS

## 2019-07-19 DIAGNOSIS — G89.29 CHRONIC BILATERAL LOW BACK PAIN WITHOUT SCIATICA: Primary | ICD-10-CM

## 2019-07-19 DIAGNOSIS — I10 BENIGN ESSENTIAL HTN: ICD-10-CM

## 2019-07-19 DIAGNOSIS — Z96.89 S/P INSERTION OF SPINAL CORD STIMULATOR: ICD-10-CM

## 2019-07-19 DIAGNOSIS — I25.10 CAD IN NATIVE ARTERY: ICD-10-CM

## 2019-07-19 DIAGNOSIS — M54.50 CHRONIC BILATERAL LOW BACK PAIN WITHOUT SCIATICA: Primary | ICD-10-CM

## 2019-07-19 DIAGNOSIS — E78.5 DYSLIPIDEMIA: ICD-10-CM

## 2019-07-19 PROCEDURE — G8399 PT W/DXA RESULTS DOCUMENT: HCPCS | Performed by: INTERNAL MEDICINE

## 2019-07-19 PROCEDURE — 4040F PNEUMOC VAC/ADMIN/RCVD: CPT | Performed by: INTERNAL MEDICINE

## 2019-07-19 PROCEDURE — 1090F PRES/ABSN URINE INCON ASSESS: CPT | Performed by: INTERNAL MEDICINE

## 2019-07-19 PROCEDURE — 1036F TOBACCO NON-USER: CPT | Performed by: INTERNAL MEDICINE

## 2019-07-19 PROCEDURE — 99214 OFFICE O/P EST MOD 30 MIN: CPT | Performed by: INTERNAL MEDICINE

## 2019-07-19 PROCEDURE — G8598 ASA/ANTIPLAT THER USED: HCPCS | Performed by: INTERNAL MEDICINE

## 2019-07-19 PROCEDURE — 1123F ACP DISCUSS/DSCN MKR DOCD: CPT | Performed by: INTERNAL MEDICINE

## 2019-07-19 PROCEDURE — G8427 DOCREV CUR MEDS BY ELIG CLIN: HCPCS | Performed by: INTERNAL MEDICINE

## 2019-07-19 PROCEDURE — G8417 CALC BMI ABV UP PARAM F/U: HCPCS | Performed by: INTERNAL MEDICINE

## 2019-07-19 ASSESSMENT — PATIENT HEALTH QUESTIONNAIRE - PHQ9
SUM OF ALL RESPONSES TO PHQ9 QUESTIONS 1 & 2: 0
2. FEELING DOWN, DEPRESSED OR HOPELESS: 0
SUM OF ALL RESPONSES TO PHQ QUESTIONS 1-9: 0
SUM OF ALL RESPONSES TO PHQ QUESTIONS 1-9: 0
1. LITTLE INTEREST OR PLEASURE IN DOING THINGS: 0

## 2019-07-31 ENCOUNTER — TELEPHONE (OUTPATIENT)
Dept: DERMATOLOGY | Age: 79
End: 2019-07-31

## 2019-07-31 NOTE — TELEPHONE ENCOUNTER
Dr. Clement Mancini patient. She has a sore on nose that recurs intermittently She has had it for several weeks. Patient does have an appointment in September but doesn't want to wait.       Call back# [phone]

## 2019-08-15 ENCOUNTER — TELEPHONE (OUTPATIENT)
Dept: ORTHOPEDIC SURGERY | Age: 79
End: 2019-08-15

## 2019-08-15 NOTE — TELEPHONE ENCOUNTER
Called patient to schedule appointment for SCS implant follow up. Patient unavailable left message to call and schedule.

## 2019-08-19 DIAGNOSIS — M54.50 CHRONIC BILATERAL LOW BACK PAIN WITHOUT SCIATICA: ICD-10-CM

## 2019-08-19 DIAGNOSIS — M25.552 BILATERAL HIP PAIN: ICD-10-CM

## 2019-08-19 DIAGNOSIS — G89.29 CHRONIC BILATERAL LOW BACK PAIN WITHOUT SCIATICA: ICD-10-CM

## 2019-08-19 DIAGNOSIS — M47.816 LUMBAR FACET ARTHROPATHY: ICD-10-CM

## 2019-08-19 DIAGNOSIS — M25.551 BILATERAL HIP PAIN: ICD-10-CM

## 2019-08-19 DIAGNOSIS — M48.061 SPINAL STENOSIS OF LUMBAR REGION WITHOUT NEUROGENIC CLAUDICATION: ICD-10-CM

## 2019-08-19 RX ORDER — HYDROCHLOROTHIAZIDE 12.5 MG/1
CAPSULE, GELATIN COATED ORAL
Qty: 90 CAPSULE | Refills: 3 | Status: SHIPPED | OUTPATIENT
Start: 2019-08-19 | End: 2020-08-17

## 2019-08-19 RX ORDER — TRAMADOL HYDROCHLORIDE 50 MG/1
50 TABLET ORAL EVERY 6 HOURS PRN
Qty: 360 TABLET | Refills: 0 | Status: SHIPPED | OUTPATIENT
Start: 2019-08-19 | End: 2019-12-20 | Stop reason: SDUPTHER

## 2019-08-19 RX ORDER — IRBESARTAN 150 MG/1
TABLET ORAL
Qty: 90 TABLET | Refills: 3 | Status: SHIPPED | OUTPATIENT
Start: 2019-08-19 | End: 2020-03-12

## 2019-08-19 RX ORDER — BISOPROLOL FUMARATE AND HYDROCHLOROTHIAZIDE 10; 6.25 MG/1; MG/1
TABLET ORAL
Qty: 90 TABLET | Refills: 3 | Status: SHIPPED | OUTPATIENT
Start: 2019-08-19 | End: 2020-08-17

## 2019-08-26 ENCOUNTER — OFFICE VISIT (OUTPATIENT)
Dept: ORTHOPEDIC SURGERY | Age: 79
End: 2019-08-26
Payer: MEDICARE

## 2019-08-26 VITALS
SYSTOLIC BLOOD PRESSURE: 169 MMHG | HEIGHT: 64 IN | WEIGHT: 185 LBS | BODY MASS INDEX: 31.58 KG/M2 | HEART RATE: 76 BPM | RESPIRATION RATE: 16 BRPM | DIASTOLIC BLOOD PRESSURE: 79 MMHG

## 2019-08-26 DIAGNOSIS — M47.816 SPONDYLOSIS OF LUMBAR REGION WITHOUT MYELOPATHY OR RADICULOPATHY: ICD-10-CM

## 2019-08-26 DIAGNOSIS — M96.1 LUMBAR POST-LAMINECTOMY SYNDROME: Primary | ICD-10-CM

## 2019-08-26 DIAGNOSIS — M51.36 DDD (DEGENERATIVE DISC DISEASE), LUMBAR: ICD-10-CM

## 2019-08-26 DIAGNOSIS — G89.4 CHRONIC PAIN SYNDROME: ICD-10-CM

## 2019-08-26 PROCEDURE — G8399 PT W/DXA RESULTS DOCUMENT: HCPCS | Performed by: PHYSICIAN ASSISTANT

## 2019-08-26 PROCEDURE — G8417 CALC BMI ABV UP PARAM F/U: HCPCS | Performed by: PHYSICIAN ASSISTANT

## 2019-08-26 PROCEDURE — 4040F PNEUMOC VAC/ADMIN/RCVD: CPT | Performed by: PHYSICIAN ASSISTANT

## 2019-08-26 PROCEDURE — G8598 ASA/ANTIPLAT THER USED: HCPCS | Performed by: PHYSICIAN ASSISTANT

## 2019-08-26 PROCEDURE — 99213 OFFICE O/P EST LOW 20 MIN: CPT | Performed by: PHYSICIAN ASSISTANT

## 2019-08-26 PROCEDURE — 1123F ACP DISCUSS/DSCN MKR DOCD: CPT | Performed by: PHYSICIAN ASSISTANT

## 2019-08-26 PROCEDURE — 1090F PRES/ABSN URINE INCON ASSESS: CPT | Performed by: PHYSICIAN ASSISTANT

## 2019-08-26 PROCEDURE — 1036F TOBACCO NON-USER: CPT | Performed by: PHYSICIAN ASSISTANT

## 2019-08-26 PROCEDURE — G8427 DOCREV CUR MEDS BY ELIG CLIN: HCPCS | Performed by: PHYSICIAN ASSISTANT

## 2019-08-26 NOTE — PROGRESS NOTES
Follow up: [address] [facility]  1940  [id number]         Chief Complaint   Patient presents with    Back Pain     Check SCS         HISTORY OF PRESENT ILLNESS:  Ms. Maria L Kang is a 67 y.o. female returns for a follow up visit for multiple medical problems. Her current presenting problems are   1. Lumbar post-laminectomy syndrome    2. Chronic pain syndrome    3. DDD (degenerative disc disease), lumbar    4. Spondylosis of lumbar region without myelopathy or radiculopathy    . As per information/history obtained from the PADT(patient assessment and documentation tool) - She complains of pain in the lower back with radiation to the N/A She rates the pain 4/10 and describes it as aching. Pain is made worse by: movement. She denies side effects from the current pain regimen. Patient reports that since the last follow up visit the physical functioning is unchanged, family/social relationships are unchanged, mood is unchanged and sleep patterns are unchanged, and that the overall functioning is unchanged. Patient denies neurological bowel or bladder. Patient in office for lower back pain. She states that her back pain is a 4/10 in today's visit. She is here to have her SCS checked. She denies any new injuries or triggers. Patient describes that she has been having improvement but has been trying each program and is now on program 3 up to 5 lights. She describes that she is not quite as good as she was on the trial.  She is however walking much better. She describes that during the trial she was at 60% but now she is around 40%. We will discuss with the representatives from [facility], to make sure that everything is working well and if there is any optimization that can be done we will do it at that time.     Associated signs and symptoms:   Neurogenic bowel or bladder symptoms:  no   Perceived weakness:  no   Difficulty walking:  no              Past Medical History:   Past Medical History: Diagnosis Date    Acquired spondylolisthesis of lumbosacral region     Bilateral primary osteoarthritis of hip     CAD (coronary artery disease)     one stent in heart    Chronic bilateral low back pain without sciatica 2/8/2018    CKD (chronic kidney disease), stage III ([address]     DDD (degenerative disc disease), lumbar     Dr. Frankey Grill Dr. Alice Dirks GERD (gastroesophageal reflux disease)     Headache(784.0)     Heel spur 08/2016    History of lumbar spinal fusion     Hyperlipidemia     Hypertension     IBS (irritable bowel syndrome)     Lyme disease 2009    suspected    MVP (mitral valve prolapse)     Neuroma nos     Osteoarthritis     Osteopenia     Post-laminectomy syndrome     Rotator cuff tear     Dr. Francoise Blackwell    Squamous cell cancer of skin of right hand 05/2018    squamous cell carcinoma    Stress incontinence, female     Urinary incontinence       Past Surgical History:     Past Surgical History:   Procedure Laterality Date    BREAST SURGERY      cyst removal    COLONOSCOPY  2006    CORONARY ANGIOPLASTY WITH STENT PLACEMENT      x1    LAMINECTOMY  5/19/14    LUMBAR 4-5 DECOMPRESSIVE LAMINECTOMY AND POSTERIOR LATERAL FUSION LUMBAR 4-5 AND PEDICLE SCREW FIXATION     LAMINECTOMY POSTERIOR THORACIC / LUMBAR Right 4/9/2019    THORACIC LAMINECTOMY WITH PADDLE LEAD AND RECHARGEBLE BATTERY IN RIGHT HIP performed by Donny Junior MD at [address] [place] [address] ARTHROCENTESIS ASPIR&/INJ INTERM JT/BURS W/O US N/A 10/24/2018    MIDLINE CAUDEL INJECTION WITH FLUOROSCOPY performed by Shakeel Mansfield MD at [address] [facility]  2002    stent, LAD    SKIN CANCER EXCISION Right 05/24/2018    Right dorsal hand, Dr. Smitha Hope N/A 3/11/2019    SPINAL CORD STIMULATOR IMPLANT TRIAL WITH FLUOROSCOPY WITH NEVRO performed by Shakeel Mansfield MD at [address] [facility]      age 10     Current Medications:     Current Outpatient Medications:   irbesartan (AVAPRO) 150 MG tablet, TAKE 1 TABLET DAILY, Disp: 90 tablet, Rfl: 3    bisoprolol-hydrochlorothiazide (ZIAC) 10-6.25 MG per tablet, TAKE 1 TABLET DAILY, Disp: 90 tablet, Rfl: 3    hydrochlorothiazide (MICROZIDE) 12.5 MG capsule, TAKE 1 CAPSULE DAILY, Disp: 90 capsule, Rfl: 3    traMADol (ULTRAM) 50 MG tablet, Take 1 tablet by mouth every 6 hours as needed for Pain for up to 90 days. , Disp: 360 tablet, Rfl: 0    aspirin 81 MG tablet, Take 81 mg by mouth daily, Disp: , Rfl:     Multiple Vitamins-Minerals (THERAPEUTIC MULTIVITAMIN-MINERALS) tablet, Take 1 tablet by mouth daily, Disp: , Rfl:     gabapentin (NEURONTIN) 300 MG capsule, Take 1 capsule by mouth nightly for 180 days. , Disp: 90 capsule, Rfl: 1    acetaminophen (TYLENOL) 325 MG tablet, Take 650 mg by mouth every 6 hours as needed for Pain, Disp: , Rfl:     melatonin (RA MELATONIN) 3 MG TABS tablet, Take 1 tablet by mouth nightly., Disp: , Rfl: 3    Calcium-Vitamin D (CALTRATE 600 PLUS-VIT D PO), Take  by mouth., Disp: , Rfl:     Misc Natural Products (OSTEO BI-FLEX TRIPLE STRENGTH PO), Take 1 tablet by mouth 2 times daily , Disp: , Rfl:   Allergies:  Statins [statins] and Adhesive tape  Social History:    reports that she has never smoked. She has never used smokeless tobacco. She reports that she does not drink alcohol or use drugs. Family History:   Family History   Problem Relation Age of Onset    Diabetes Brother     Diabetes Mother     Heart Disease Mother     Heart Disease Father     Cancer Father         skin       REVIEW OF SYSTEMS:   CONSTITUTIONAL: Denies unexplained weight loss, fevers, chills or fatigue  NEUROLOGICAL: Denies unsteady gait or progressive weakness  MUSCULOSKELETAL: Denies joint swelling or redness  GI: Denies nausea, vomiting, diarrhea   : Denies bowel or bladder issues       PHYSICAL EXAM:    Vitals: Blood pressure (!) 169/79, pulse 76, resp.  rate 16, height 5' 4\" (1.626 m), weight 185 lb (83.9 rashes, ulcerations or lesions. · Reflexes: Reflexes are symmetrically 2+ at the patellar and ankle tendons. Clonus absent bilaterally at the feet. · Gait & station: normal, patient ambulates without assistance and no ataxia  · Additional Examinations:  · RIGHT LOWER EXTREMITY: Inspection/examination of the right lower extremity does not show any tenderness, deformity or injury. Range of motion is normal and pain-free. There is no gross instability. There are no rashes, ulcerations or lesions. Strength and tone are normal. No atrophy or abnormal movements are noted. · LEFT LOWER EXTREMITY:  Inspection/examination of the left lower extremity does not show any tenderness, deformity or injury. Range of motion is normal and pain-free. There is no gross instability. There are no rashes, ulcerations or lesions. Strength and tone are normal. No atrophy or abnormal movements are noted. Diagnostic Testing:    MRI THORACIC 2/22/2019  Multilevel degenerative changes of the thoracic spine without spinal canal   stenosis. Results for orders placed or performed during the hospital encounter of 04/09/19   Protime-INR   Result Value Ref Range    Protime 11.3 9.8 - 13.0 sec    INR 0.99 0.86 - 1.14   APTT   Result Value Ref Range    aPTT 32.4 26.0 - 36.0 sec     Impression:       1. Lumbar post-laminectomy syndrome    2. Chronic pain syndrome    3. DDD (degenerative disc disease), lumbar    4. Spondylosis of lumbar region without myelopathy or radiculopathy        Plan:  Clinical Course: Above diagnoses are improving     I discussed the diagnosis and the treatment options with Rabia Perkins today. In Summary:  The various treatment options were outlined and discussed with Rabia Perkins including:  Conservative care options: physical therapy, ice, medications, bracing, and activity modification. The indications for therapeutic injections. The indications for additional imaging/laboratory studies.

## 2019-08-30 RX ORDER — GABAPENTIN 300 MG/1
CAPSULE ORAL
Qty: 90 CAPSULE | Refills: 0 | Status: SHIPPED | OUTPATIENT
Start: 2019-08-30 | End: 2019-12-04 | Stop reason: SDUPTHER

## 2019-09-12 NOTE — PROGRESS NOTES
1 tablet by mouth 2 times daily          Physical Examination     The following were examined and determined to be normal: Psych/Neuro, Scalp/hair, Conjunctivae/eyelids, Gums/teeth/lips, Neck, Breast/axilla/chest, Abdomen, Back, LUE, RLE, LLE, Nails/digits and Genitalia/groin/buttocks. The following were examined and determined to be abnormal: Head/face and RUE. -General: Well-appearing, NAD  1. Dorsum R hand - scar clear   1a. R nasal sidewall - 7mm ill-defined focally hemorrhagically-crusted telangiectatic papule       2. L cheek 4, L upper forehead 1 - ill-defined irregularly-shaped roughly-scaling thin pink macule(s)/papule(s)     Assessment and Plan      1. History of NMSC   -Reviewed sun protective behavior -- sun avoidance during the peak hours of the day, sun-protective clothing (including hat and sunglasses), sunscreen use (water resistant, broad spectrum, SPF at least 30, need for reapplication every 2 to 3 hours), avoidance of tanning beds  -Full skin exam in 1 year (sooner if indicated)        1a. Neoplasm of uncertain behavior of skin - R/o BCC, R nasal sidewall   -Discussed possible diagnosis. Patient agreeable to biopsy. Verbal consent obtained after risks (infection, bleeding, scar), benefits and alternatives explained. -Area(s) to be biopsied were marked with a surgical pen. Site(s) were cleansed with alcohol. Local anesthesia achieved with 1% lidocaine with epinephrine/sodium bicarbonate. Shave biopsy performed with a razor blade. Hemostasis was achieved with aluminum chloride. The wound(s) were dressed with petrolatum and covered with a bandage. Specimen(s) sent to pathology. Pt educated re: risk of bleeding, infection, scar and wound care instructions. 2. Actinic keratosis(es)  -Edu re: relationship with chronic cumulative sun exposure, low premalignant potential.   -5 lesion(s) treated w/ liquid nitrogen x 2 cycles - L cheek 4, L upper forehead 1.  Edu re: risk of blister

## 2019-09-16 ENCOUNTER — OFFICE VISIT (OUTPATIENT)
Dept: DERMATOLOGY | Age: 79
End: 2019-09-16
Payer: MEDICARE

## 2019-09-16 DIAGNOSIS — L57.0 ACTINIC KERATOSES: ICD-10-CM

## 2019-09-16 DIAGNOSIS — D48.5 NEOPLASM OF UNCERTAIN BEHAVIOR OF SKIN: Primary | ICD-10-CM

## 2019-09-16 DIAGNOSIS — Z12.83 SCREENING EXAM FOR SKIN CANCER: ICD-10-CM

## 2019-09-16 DIAGNOSIS — Z85.828 HISTORY OF NONMELANOMA SKIN CANCER: ICD-10-CM

## 2019-09-16 PROCEDURE — G8417 CALC BMI ABV UP PARAM F/U: HCPCS | Performed by: DERMATOLOGY

## 2019-09-16 PROCEDURE — 11102 TANGNTL BX SKIN SINGLE LES: CPT | Performed by: DERMATOLOGY

## 2019-09-16 PROCEDURE — 99213 OFFICE O/P EST LOW 20 MIN: CPT | Performed by: DERMATOLOGY

## 2019-09-16 PROCEDURE — 1123F ACP DISCUSS/DSCN MKR DOCD: CPT | Performed by: DERMATOLOGY

## 2019-09-16 PROCEDURE — G8399 PT W/DXA RESULTS DOCUMENT: HCPCS | Performed by: DERMATOLOGY

## 2019-09-16 PROCEDURE — 17000 DESTRUCT PREMALG LESION: CPT | Performed by: DERMATOLOGY

## 2019-09-16 PROCEDURE — G8598 ASA/ANTIPLAT THER USED: HCPCS | Performed by: DERMATOLOGY

## 2019-09-16 PROCEDURE — 1036F TOBACCO NON-USER: CPT | Performed by: DERMATOLOGY

## 2019-09-16 PROCEDURE — 1090F PRES/ABSN URINE INCON ASSESS: CPT | Performed by: DERMATOLOGY

## 2019-09-16 PROCEDURE — G8427 DOCREV CUR MEDS BY ELIG CLIN: HCPCS | Performed by: DERMATOLOGY

## 2019-09-16 PROCEDURE — 4040F PNEUMOC VAC/ADMIN/RCVD: CPT | Performed by: DERMATOLOGY

## 2019-09-16 PROCEDURE — 17003 DESTRUCT PREMALG LES 2-14: CPT | Performed by: DERMATOLOGY

## 2019-09-16 RX ORDER — VITAMIN E 268 MG
400 CAPSULE ORAL NIGHTLY
COMMUNITY

## 2019-09-16 NOTE — PATIENT INSTRUCTIONS
Protecting Yourself From the Sun    · Apply broad spectrum water resistant sunscreen with an SPF of at least 30 to exposed areas of the skin. Dont forget the ears and lips! Remember to reapply sunscreen about every 2 hours and after swimming or sweating. · Wear sun protective clothing. Swim shirts (aka. rash guards) are a great idea and negates the need to reapply sunscreen in those areas. · Seek the shade whenever possible especially between the hours of 10 am and 4 pm when the suns rays are the strongest.     · Avoid tanning beds       Biopsy Wound Care Instructions    · Keep the bandage in place for 24 hours. · Cleanse the wound with mild soapy water daily   Gently dry the area.  Apply Vaseline or petroleum jelly to the wound using a cotton tipped applicator.  Cover with a clean bandage.  Repeat this process until the biopsy site is healed.  If you had stitches placed, continue treating the site until the stitches are removed. Remember to make an appointment to return to have your stitches removed by our staff.  You may shower and bathe as usual.       ** Biopsy results generally take around 7 business days to come back. If you have not heard from us by then, please call the office at [phone] between 8AM and 4PM Monday through Friday. Cryosurgery (Freezing) Wound Care Instructions    AFTER THE PROCEDURE:    You will notice swelling and redness around the site. This is normal.    You may experience a sharp or sore feeling for the next several days. For this discomfort, you may take acetaminophen (Tylenol©).  A blister may develop at the treated area, sometimes as soon as by the end of the day. After several days, the blister will subside and a scab will form.  If the area is bumped or traumatized during the first few days following freezing, you may develop bleeding into the blister, forming a blood blister. This is nothing to be alarmed about.    If the blister is tense,

## 2019-09-19 LAB — DERMATOLOGY PATHOLOGY REPORT: ABNORMAL

## 2019-09-20 ENCOUNTER — TELEPHONE (OUTPATIENT)
Dept: DERMATOLOGY | Age: 79
End: 2019-09-20

## 2019-09-20 DIAGNOSIS — C44.311 BASAL CELL CARCINOMA (BCC) OF RIGHT NASAL SIDEWALL: Primary | ICD-10-CM

## 2019-09-20 NOTE — TELEPHONE ENCOUNTER
----- Message from Ming Giron MD sent at 9/19/2019  4:24 PM EDT -----  Refer to [facility]

## 2019-09-23 ENCOUNTER — TELEPHONE (OUTPATIENT)
Dept: ORTHOPEDIC SURGERY | Age: 79
End: 2019-09-23

## 2019-09-23 ENCOUNTER — OFFICE VISIT (OUTPATIENT)
Dept: ORTHOPEDIC SURGERY | Age: 79
End: 2019-09-23
Payer: MEDICARE

## 2019-09-23 VITALS
BODY MASS INDEX: 31.58 KG/M2 | WEIGHT: 184.97 LBS | HEIGHT: 64 IN | SYSTOLIC BLOOD PRESSURE: 143 MMHG | HEART RATE: 69 BPM | DIASTOLIC BLOOD PRESSURE: 72 MMHG

## 2019-09-23 DIAGNOSIS — M70.61 GREATER TROCHANTERIC BURSITIS OF BOTH HIPS: ICD-10-CM

## 2019-09-23 DIAGNOSIS — M96.1 LUMBAR POST-LAMINECTOMY SYNDROME: Primary | ICD-10-CM

## 2019-09-23 DIAGNOSIS — G89.4 CHRONIC PAIN SYNDROME: ICD-10-CM

## 2019-09-23 DIAGNOSIS — M51.36 DDD (DEGENERATIVE DISC DISEASE), LUMBAR: ICD-10-CM

## 2019-09-23 DIAGNOSIS — M47.816 SPONDYLOSIS OF LUMBAR REGION WITHOUT MYELOPATHY OR RADICULOPATHY: ICD-10-CM

## 2019-09-23 DIAGNOSIS — M70.62 GREATER TROCHANTERIC BURSITIS OF BOTH HIPS: ICD-10-CM

## 2019-09-23 PROCEDURE — 4040F PNEUMOC VAC/ADMIN/RCVD: CPT | Performed by: PHYSICIAN ASSISTANT

## 2019-09-23 PROCEDURE — G8427 DOCREV CUR MEDS BY ELIG CLIN: HCPCS | Performed by: PHYSICIAN ASSISTANT

## 2019-09-23 PROCEDURE — G8399 PT W/DXA RESULTS DOCUMENT: HCPCS | Performed by: PHYSICIAN ASSISTANT

## 2019-09-23 PROCEDURE — 20611 DRAIN/INJ JOINT/BURSA W/US: CPT | Performed by: PHYSICIAN ASSISTANT

## 2019-09-23 PROCEDURE — 99213 OFFICE O/P EST LOW 20 MIN: CPT | Performed by: PHYSICIAN ASSISTANT

## 2019-09-23 PROCEDURE — G8598 ASA/ANTIPLAT THER USED: HCPCS | Performed by: PHYSICIAN ASSISTANT

## 2019-09-23 PROCEDURE — G8417 CALC BMI ABV UP PARAM F/U: HCPCS | Performed by: PHYSICIAN ASSISTANT

## 2019-09-23 PROCEDURE — 1123F ACP DISCUSS/DSCN MKR DOCD: CPT | Performed by: PHYSICIAN ASSISTANT

## 2019-09-23 PROCEDURE — 1090F PRES/ABSN URINE INCON ASSESS: CPT | Performed by: PHYSICIAN ASSISTANT

## 2019-09-23 PROCEDURE — 1036F TOBACCO NON-USER: CPT | Performed by: PHYSICIAN ASSISTANT

## 2019-09-23 NOTE — PROGRESS NOTES
9/23/19 11:39 AM         NDC: 5474-5707-31   Juan Minh    Lot Number: [id number]       Comments: BILATERAL GT BURSA INJ        NDC: 1657-9520-22   -   LIDOCAINE 1%    Lot Number: [id number]       Comments: BILATERAL GT BURSA INJ

## 2019-09-24 NOTE — TELEPHONE ENCOUNTER
Called and spoke to patient. She was under the impression that she needed to have an ultrasound performed based on \"order\" that was in her mychart. I explained this order was from her bursa injections performed under US yesterday in the office and no further testing is needed on her part. She noted understanding.

## 2019-10-02 ENCOUNTER — PROCEDURE VISIT (OUTPATIENT)
Dept: SURGERY | Age: 79
End: 2019-10-02
Payer: MEDICARE

## 2019-10-02 VITALS
TEMPERATURE: 98.1 F | DIASTOLIC BLOOD PRESSURE: 64 MMHG | HEART RATE: 65 BPM | OXYGEN SATURATION: 93 % | SYSTOLIC BLOOD PRESSURE: 132 MMHG | BODY MASS INDEX: 31.91 KG/M2 | WEIGHT: 186 LBS

## 2019-10-02 DIAGNOSIS — C44.311 BASAL CELL CARCINOMA OF RIGHT SIDE OF NOSE: Primary | ICD-10-CM

## 2019-10-02 PROCEDURE — 14061 TIS TRNFR E/N/E/L10.1-30SQCM: CPT | Performed by: DERMATOLOGY

## 2019-10-02 PROCEDURE — 17311 MOHS 1 STAGE H/N/HF/G: CPT | Performed by: DERMATOLOGY

## 2019-10-02 PROCEDURE — 17312 MOHS ADDL STAGE: CPT | Performed by: DERMATOLOGY

## 2019-10-02 RX ORDER — CEPHALEXIN 500 MG/1
500 CAPSULE ORAL 3 TIMES DAILY
Qty: 15 CAPSULE | Refills: 0 | Status: SHIPPED | OUTPATIENT
Start: 2019-10-02 | End: 2019-10-28 | Stop reason: ALTCHOICE

## 2019-10-03 ENCOUNTER — TELEPHONE (OUTPATIENT)
Dept: SURGERY | Age: 79
End: 2019-10-03

## 2019-10-09 ENCOUNTER — OFFICE VISIT (OUTPATIENT)
Dept: SURGERY | Age: 79
End: 2019-10-09

## 2019-10-09 DIAGNOSIS — Z48.02 VISIT FOR SUTURE REMOVAL: Primary | ICD-10-CM

## 2019-10-09 PROCEDURE — 99024 POSTOP FOLLOW-UP VISIT: CPT | Performed by: DERMATOLOGY

## 2019-10-28 ENCOUNTER — OFFICE VISIT (OUTPATIENT)
Dept: INTERNAL MEDICINE CLINIC | Age: 79
End: 2019-10-28
Payer: MEDICARE

## 2019-10-28 VITALS
HEART RATE: 92 BPM | HEIGHT: 64 IN | RESPIRATION RATE: 12 BRPM | BODY MASS INDEX: 32.27 KG/M2 | DIASTOLIC BLOOD PRESSURE: 90 MMHG | WEIGHT: 189 LBS | SYSTOLIC BLOOD PRESSURE: 146 MMHG

## 2019-10-28 DIAGNOSIS — Z13.6 SCREENING FOR CARDIOVASCULAR CONDITION: ICD-10-CM

## 2019-10-28 DIAGNOSIS — N18.30 CKD (CHRONIC KIDNEY DISEASE), STAGE III (HCC): ICD-10-CM

## 2019-10-28 DIAGNOSIS — I10 BENIGN ESSENTIAL HTN: ICD-10-CM

## 2019-10-28 DIAGNOSIS — Z23 FLU VACCINE NEED: ICD-10-CM

## 2019-10-28 DIAGNOSIS — Z00.00 ROUTINE GENERAL MEDICAL EXAMINATION AT A HEALTH CARE FACILITY: Primary | ICD-10-CM

## 2019-10-28 DIAGNOSIS — I25.10 CAD IN NATIVE ARTERY: ICD-10-CM

## 2019-10-28 DIAGNOSIS — E78.5 DYSLIPIDEMIA: ICD-10-CM

## 2019-10-28 DIAGNOSIS — M94.0 COSTOCHONDRITIS: ICD-10-CM

## 2019-10-28 PROCEDURE — G0446 INTENS BEHAVE THER CARDIO DX: HCPCS | Performed by: INTERNAL MEDICINE

## 2019-10-28 PROCEDURE — G8417 CALC BMI ABV UP PARAM F/U: HCPCS | Performed by: INTERNAL MEDICINE

## 2019-10-28 PROCEDURE — G0438 PPPS, INITIAL VISIT: HCPCS | Performed by: INTERNAL MEDICINE

## 2019-10-28 PROCEDURE — 1090F PRES/ABSN URINE INCON ASSESS: CPT | Performed by: INTERNAL MEDICINE

## 2019-10-28 PROCEDURE — G8598 ASA/ANTIPLAT THER USED: HCPCS | Performed by: INTERNAL MEDICINE

## 2019-10-28 PROCEDURE — 1123F ACP DISCUSS/DSCN MKR DOCD: CPT | Performed by: INTERNAL MEDICINE

## 2019-10-28 PROCEDURE — G0008 ADMIN INFLUENZA VIRUS VAC: HCPCS | Performed by: INTERNAL MEDICINE

## 2019-10-28 PROCEDURE — 90653 IIV ADJUVANT VACCINE IM: CPT | Performed by: INTERNAL MEDICINE

## 2019-10-28 PROCEDURE — 4040F PNEUMOC VAC/ADMIN/RCVD: CPT | Performed by: INTERNAL MEDICINE

## 2019-10-28 PROCEDURE — G8427 DOCREV CUR MEDS BY ELIG CLIN: HCPCS | Performed by: INTERNAL MEDICINE

## 2019-10-28 PROCEDURE — 1036F TOBACCO NON-USER: CPT | Performed by: INTERNAL MEDICINE

## 2019-10-28 PROCEDURE — 99213 OFFICE O/P EST LOW 20 MIN: CPT | Performed by: INTERNAL MEDICINE

## 2019-10-28 PROCEDURE — G8482 FLU IMMUNIZE ORDER/ADMIN: HCPCS | Performed by: INTERNAL MEDICINE

## 2019-10-28 PROCEDURE — G8399 PT W/DXA RESULTS DOCUMENT: HCPCS | Performed by: INTERNAL MEDICINE

## 2019-10-28 ASSESSMENT — PATIENT HEALTH QUESTIONNAIRE - PHQ9
SUM OF ALL RESPONSES TO PHQ QUESTIONS 1-9: 0
SUM OF ALL RESPONSES TO PHQ QUESTIONS 1-9: 0

## 2019-10-28 ASSESSMENT — LIFESTYLE VARIABLES: HOW OFTEN DO YOU HAVE A DRINK CONTAINING ALCOHOL: 0

## 2019-11-04 ENCOUNTER — PATIENT MESSAGE (OUTPATIENT)
Dept: INTERNAL MEDICINE CLINIC | Age: 79
End: 2019-11-04

## 2019-11-05 ENCOUNTER — TELEPHONE (OUTPATIENT)
Dept: INTERNAL MEDICINE CLINIC | Age: 79
End: 2019-11-05

## 2019-11-06 ENCOUNTER — OFFICE VISIT (OUTPATIENT)
Dept: INTERNAL MEDICINE CLINIC | Age: 79
End: 2019-11-06
Payer: MEDICARE

## 2019-11-06 VITALS
HEART RATE: 70 BPM | OXYGEN SATURATION: 95 % | DIASTOLIC BLOOD PRESSURE: 80 MMHG | SYSTOLIC BLOOD PRESSURE: 144 MMHG | TEMPERATURE: 98 F

## 2019-11-06 DIAGNOSIS — M99.08 RIB CAGE DYSFUNCTION: ICD-10-CM

## 2019-11-06 DIAGNOSIS — R06.02 SOB (SHORTNESS OF BREATH): ICD-10-CM

## 2019-11-06 DIAGNOSIS — M79.18 INTERCOSTAL MYALGIA: ICD-10-CM

## 2019-11-06 DIAGNOSIS — R07.81 RIB PAIN: Primary | ICD-10-CM

## 2019-11-06 PROCEDURE — 1090F PRES/ABSN URINE INCON ASSESS: CPT | Performed by: INTERNAL MEDICINE

## 2019-11-06 PROCEDURE — 99213 OFFICE O/P EST LOW 20 MIN: CPT | Performed by: INTERNAL MEDICINE

## 2019-11-06 PROCEDURE — 1036F TOBACCO NON-USER: CPT | Performed by: INTERNAL MEDICINE

## 2019-11-06 PROCEDURE — G8428 CUR MEDS NOT DOCUMENT: HCPCS | Performed by: INTERNAL MEDICINE

## 2019-11-06 PROCEDURE — G8482 FLU IMMUNIZE ORDER/ADMIN: HCPCS | Performed by: INTERNAL MEDICINE

## 2019-11-06 PROCEDURE — G8399 PT W/DXA RESULTS DOCUMENT: HCPCS | Performed by: INTERNAL MEDICINE

## 2019-11-06 PROCEDURE — 4040F PNEUMOC VAC/ADMIN/RCVD: CPT | Performed by: INTERNAL MEDICINE

## 2019-11-06 PROCEDURE — G8417 CALC BMI ABV UP PARAM F/U: HCPCS | Performed by: INTERNAL MEDICINE

## 2019-11-06 PROCEDURE — 1123F ACP DISCUSS/DSCN MKR DOCD: CPT | Performed by: INTERNAL MEDICINE

## 2019-11-06 PROCEDURE — G8598 ASA/ANTIPLAT THER USED: HCPCS | Performed by: INTERNAL MEDICINE

## 2019-11-06 RX ORDER — PREDNISONE 10 MG/1
TABLET ORAL
Qty: 35 TABLET | Refills: 0 | Status: SHIPPED | OUTPATIENT
Start: 2019-11-06 | End: 2019-11-16

## 2019-11-07 ENCOUNTER — HOSPITAL ENCOUNTER (OUTPATIENT)
Dept: GENERAL RADIOLOGY | Age: 79
Discharge: HOME | End: 2019-11-07
Payer: MEDICARE

## 2019-11-07 ENCOUNTER — HOSPITAL ENCOUNTER (OUTPATIENT)
Age: 79
Discharge: HOME | End: 2019-11-07
Payer: MEDICARE

## 2019-11-07 DIAGNOSIS — R06.02 SOB (SHORTNESS OF BREATH): ICD-10-CM

## 2019-11-07 DIAGNOSIS — M99.08 RIB CAGE DYSFUNCTION: ICD-10-CM

## 2019-11-07 DIAGNOSIS — R07.81 RIB PAIN: ICD-10-CM

## 2019-11-07 PROCEDURE — 71101 X-RAY EXAM UNILAT RIBS/CHEST: CPT

## 2019-11-07 PROCEDURE — 71100 X-RAY EXAM RIBS UNI 2 VIEWS: CPT

## 2019-11-25 ENCOUNTER — OFFICE VISIT (OUTPATIENT)
Dept: SURGERY | Age: 79
End: 2019-11-25

## 2019-11-25 DIAGNOSIS — L90.5 SCAR OF NOSE: Primary | ICD-10-CM

## 2019-11-25 PROCEDURE — 99024 POSTOP FOLLOW-UP VISIT: CPT | Performed by: DERMATOLOGY

## 2019-12-04 RX ORDER — GABAPENTIN 300 MG/1
CAPSULE ORAL
Qty: 90 CAPSULE | Refills: 0 | Status: SHIPPED | OUTPATIENT
Start: 2019-12-04 | End: 2020-03-02

## 2019-12-20 DIAGNOSIS — M25.551 BILATERAL HIP PAIN: ICD-10-CM

## 2019-12-20 DIAGNOSIS — M25.552 BILATERAL HIP PAIN: ICD-10-CM

## 2019-12-20 DIAGNOSIS — M54.50 CHRONIC BILATERAL LOW BACK PAIN WITHOUT SCIATICA: ICD-10-CM

## 2019-12-20 DIAGNOSIS — G89.29 CHRONIC BILATERAL LOW BACK PAIN WITHOUT SCIATICA: ICD-10-CM

## 2019-12-20 DIAGNOSIS — M47.816 LUMBAR FACET ARTHROPATHY: ICD-10-CM

## 2019-12-20 DIAGNOSIS — M48.061 SPINAL STENOSIS OF LUMBAR REGION WITHOUT NEUROGENIC CLAUDICATION: ICD-10-CM

## 2019-12-20 RX ORDER — TRAMADOL HYDROCHLORIDE 50 MG/1
50 TABLET ORAL EVERY 6 HOURS PRN
Qty: 360 TABLET | Refills: 0 | Status: SHIPPED | OUTPATIENT
Start: 2019-12-20 | End: 2020-04-17 | Stop reason: SDUPTHER

## 2020-02-25 ENCOUNTER — OFFICE VISIT (OUTPATIENT)
Dept: INTERNAL MEDICINE CLINIC | Age: 80
End: 2020-02-25
Payer: MEDICARE

## 2020-02-25 VITALS
HEIGHT: 62 IN | BODY MASS INDEX: 34.96 KG/M2 | TEMPERATURE: 98.6 F | OXYGEN SATURATION: 94 % | DIASTOLIC BLOOD PRESSURE: 82 MMHG | HEART RATE: 76 BPM | SYSTOLIC BLOOD PRESSURE: 152 MMHG | WEIGHT: 190 LBS

## 2020-02-25 PROBLEM — Z12.11 SPECIAL SCREENING FOR MALIGNANT NEOPLASM OF COLON: Status: ACTIVE | Noted: 2020-02-25

## 2020-02-25 PROCEDURE — 1036F TOBACCO NON-USER: CPT | Performed by: INTERNAL MEDICINE

## 2020-02-25 PROCEDURE — G8399 PT W/DXA RESULTS DOCUMENT: HCPCS | Performed by: INTERNAL MEDICINE

## 2020-02-25 PROCEDURE — 99214 OFFICE O/P EST MOD 30 MIN: CPT | Performed by: INTERNAL MEDICINE

## 2020-02-25 PROCEDURE — G8417 CALC BMI ABV UP PARAM F/U: HCPCS | Performed by: INTERNAL MEDICINE

## 2020-02-25 PROCEDURE — 1090F PRES/ABSN URINE INCON ASSESS: CPT | Performed by: INTERNAL MEDICINE

## 2020-02-25 PROCEDURE — G8482 FLU IMMUNIZE ORDER/ADMIN: HCPCS | Performed by: INTERNAL MEDICINE

## 2020-02-25 PROCEDURE — 1123F ACP DISCUSS/DSCN MKR DOCD: CPT | Performed by: INTERNAL MEDICINE

## 2020-02-25 PROCEDURE — 4040F PNEUMOC VAC/ADMIN/RCVD: CPT | Performed by: INTERNAL MEDICINE

## 2020-02-25 PROCEDURE — G8427 DOCREV CUR MEDS BY ELIG CLIN: HCPCS | Performed by: INTERNAL MEDICINE

## 2020-02-25 RX ORDER — FLUTICASONE PROPIONATE 50 MCG
SPRAY, SUSPENSION (ML) NASAL
COMMUNITY
Start: 2020-01-07 | End: 2020-05-01

## 2020-02-25 NOTE — PATIENT INSTRUCTIONS
get worse or are not getting better within 5 minutes, call  911 right away. Stay on the phone. The emergency  will give you further instructions.     · Be sure to tell your doctor about any angina symptoms that you have had, even if they went away.     · Do not take any over-the-counter medicines, vitamins, or herbal products without talking to your doctor first.   Justus Alejandro your doctor if a cardiac rehab program is right for you. Cardiac rehab can help you make lifestyle changes. In cardiac rehab, a team of health professionals provides education and support to help you make new, healthy habits.    · Do not smoke. Avoid secondhand smoke too. Smoking can increase your risk of a heart attack or stroke. If you need help quitting, talk to your doctor about stop-smoking programs and medicines. These can increase your chances of quitting for good.     · Eat a heart-healthy diet that is high in fiber and low in saturated fat and sodium. ? Learn what a serving is. A \"serving\" and a \"portion\" are not always the same thing. Make sure that you are not eating larger portions than recommended. For example, a serving of pasta is ½ cup. A serving size of meat is 2 to 3 ounces; a 3-ounce serving is about the size of a deck of cards. ? Eat a variety of grain products every day. Include whole-grain foods such as oats, whole wheat bread, and brown rice. ? Eat fish, skinless poultry, lean meats, and soy products such as tofu instead of high-fat meats. Cut out all visible fat when you prepare meat. ? Eat a variety of fruit and vegetables every day. They have lots of nutrients that help protect against heart disease, and they have little--if any--fat. Keep carrots, celery, and other veggies handy for snacks. Buy fruit that is in season and store it where you can see it so that you will be tempted to eat it. Cook dishes that have a lot of veggies in them, such as stir-fried dishes and soups.   ? Read food labels and try to avoid saturated fat and trans fat. They increase your risk of heart disease. Bake, broil, grill, or steam foods instead of frying them. Use olive or canola oil when you cook. Try cholesterol-lowering spreads, such as Benecol or Take Control. ? Limit sodium. Your doctor may recommend that you limit sodium to less than 1,500 mg a day. ? Limit processed foods, including cookies and crackers. ? Limit drinks and foods with added sugar. ? Choose low-fat or fat-free milk and dairy products. ? Limit alcohol to 2 drinks a day for men and 1 drink a day for women. Too much alcohol can cause health problems.     · If your doctor recommends it, get more exercise. Walking is a good choice. Bit by bit, increase the amount you walk every day. Try for at least 30 minutes on most days of the week. You also may want to swim, bike, or do other activities.     · Stay at a healthy weight. Lose weight if you need to.     · Talk to your family, friends, or a therapist about your feelings. It is normal to feel upset about having this disease and to feel afraid of having a heart attack. Talking openly about your feelings can help you cope. If you think you have symptoms of depression, talk to your doctor.     · Avoid colds and flu. Get a pneumococcal vaccine shot. If you have had one before, ask your doctor whether you need another dose. Get a flu vaccine every year. If you must be around people with colds or flu, wash your hands often. When should you call for help? Call 911 anytime you think you may need emergency care. For example, call if:    · You have symptoms of a heart attack. These may include:  ? Chest pain or pressure, or a strange feeling in the chest.  ? Sweating. ? Shortness of breath. ? Nausea or vomiting. ? Pain, pressure, or a strange feeling in the back, neck, jaw, or upper belly or in one or both shoulders or arms. ? Lightheadedness or sudden weakness. ? A fast or irregular heartbeat.   After you call  911, symptoms. Follow-up care is a key part of your treatment and safety. Be sure to make and go to all appointments, and call your doctor if you are having problems. It's also a good idea to know your test results and keep a list of the medicines you take. How can you care for yourself at home? · Do not smoke or allow others to smoke around you. If you need help quitting, talk to your doctor about stop-smoking programs and medicines. These can increase your chances of quitting for good. · Get plenty of rest and sleep. · Take your medicines exactly as prescribed. Call your doctor if you think you are having a problem with your medicine. · Find healthy ways to deal with stress. ? Exercise daily. ? Get plenty of sleep. ? Eat regularly and well. When should you call for help? Call 911 anytime you think you may need emergency care. For example, call if:    · You have severe shortness of breath.     · You have symptoms of a heart attack. These may include:  ? Chest pain or pressure, or a strange feeling in the chest.  ? Sweating. ? Shortness of breath. ? Nausea or vomiting. ? Pain, pressure, or a strange feeling in the back, neck, jaw, or upper belly or in one or both shoulders or arms. ? Lightheadedness or sudden weakness. ? A fast or irregular heartbeat. After you call  911, the  may tell you to chew 1 adult-strength or 2 to 4 low-dose aspirin. Wait for an ambulance. Do not try to drive yourself.    Call your doctor now or seek immediate medical care if:    · Your shortness of breath gets worse or you start to wheeze. Wheezing is a high-pitched sound when you breathe.     · You wake up at night out of breath or have to prop your head up on several pillows to breathe.     · You are short of breath after only light activity or while at rest.    Watch closely for changes in your health, and be sure to contact your doctor if:    · You do not get better over the next 1 to 2 days.    Where can you learn calcium start to build up in your arteries. This buildup is called plaque. Plaque makes your arteries narrower and stiffer. Blood can't flow through them as easily. · This lack of good blood flow starts to damage some of the organs in your body. This can lead to problems such as coronary artery disease and heart attack, heart failure, stroke, kidney failure, and eye damage. How can you prevent high blood pressure? · Stay at a healthy weight. · Try to limit how much sodium you eat to less than 2,300 milligrams (mg) a day. If you limit your sodium to 1,500 mg a day, you can lower your blood pressure even more. ? Buy foods that are labeled \"unsalted,\" \"sodium-free,\" or \"low-sodium. \" Foods labeled \"reduced-sodium\" and \"light sodium\" may still have too much sodium. ? Flavor your food with garlic, lemon juice, onion, vinegar, herbs, and spices instead of salt. Do not use soy sauce, steak sauce, onion salt, garlic salt, mustard, or ketchup on your food. ? Use less salt (or none) when recipes call for it. You can often use half the salt a recipe calls for without losing flavor. · Be physically active. Get at least 30 minutes of exercise on most days of the week. Walking is a good choice. You also may want to do other activities, such as running, swimming, cycling, or playing tennis or team sports. · Limit alcohol to 2 drinks a day for men and 1 drink a day for women. · Eat plenty of fruits, vegetables, and low-fat dairy products. Eat less saturated and total fats. How is high blood pressure treated? · Your doctor will suggest making lifestyle changes to help your heart. For example, your doctor may ask you to eat healthy foods, quit smoking, lose extra weight, and be more active. · If lifestyle changes don't help enough, your doctor may recommend that you take medicine. · When blood pressure is very high, medicines are needed to lower it. Follow-up care is a key part of your treatment and safety.  Be sure to make and go to all appointments, and call your doctor if you are having problems. It's also a good idea to know your test results and keep a list of the medicines you take. Where can you learn more? Go to [email or web address] org and sign in to your [employer] account. Enter P501 in the [employer] box to learn more about \"Learning About High Blood Pressure. \"     If you do not have an account, please click on the \"Sign Up Now\" link. Current as of: April 9, 2019  Content Version: 12.3  © 9894-6118 [employer]wise, Incorporated. Care instructions adapted under license by [facility] ([facility]). If you have questions about a medical condition or this instruction, always ask your healthcare professional. [address] any warranty or liability for your use of this information.

## 2020-02-25 NOTE — PROGRESS NOTES
Interested in starting an exercise program    CKD-- Stage 3. She sees Dr. Jen Alexander. Interval history ---> States that she saw Dr. Jen Alexander in September, no changes or concerns at that time. Past Medical History:   Diagnosis Date    Acquired spondylolisthesis of lumbosacral region     Bilateral primary osteoarthritis of hip     CAD (coronary artery disease)     one stent in heart    Chronic bilateral low back pain without sciatica 2/8/2018    CKD (chronic kidney disease), stage III (HCC)     DDD (degenerative disc disease), lumbar     Dr. Dale Vargas GERD (gastroesophageal reflux disease)     Headache(784.0)     Heel spur 08/2016    History of lumbar spinal fusion     Hyperlipidemia     Hypertension     IBS (irritable bowel syndrome)     Lyme disease 2009    suspected    MVP (mitral valve prolapse)     Neuroma nos     Osteoarthritis     Osteopenia     Post-laminectomy syndrome     Rotator cuff tear     Dr. Vu Antonio Squamous cell cancer of skin of right hand 05/2018    squamous cell carcinoma    Stress incontinence, female     Urinary incontinence          Review of Systems-- As per HPI        Objective     Vitals:    02/25/20 1026 02/25/20 1134 02/25/20 1147   BP: (!) 168/98 (!) 160/94 (!) 152/82   Site: Left Upper Arm     Position: Sitting     Cuff Size: Medium Adult     Pulse: 76     Temp: 98.6 °F (37 °C)     TempSrc: Oral     SpO2: 94%     Weight: 190 lb (86.2 kg)     Height: 5' 1.5\" (1.562 m)       Body mass index is 35.32 kg/m². Wt Readings from Last 3 Encounters:   02/25/20 190 lb (86.2 kg)   10/28/19 189 lb (85.7 kg)   10/02/19 186 lb (84.4 kg)     BP Readings from Last 3 Encounters:   02/25/20 (!) 152/82   11/06/19 (!) 144/80   10/28/19 (!) 146/90       GEN: AND. A&O  HEENT: NC/AT, SWAPNA, EOMI. Anicteric. Oral cavity clear. Tongue midline  NECK: Supple, No thyromegaly or nodules. No JVD. Trachea midline.   LYMPH:  No C/SC nodes  CV: Regular rhythm. Rate normal.   No murmurs. No ectopy  PULM: CTA, no wheezing  VASC: No carotid bruits. Pedal pulses are symmetric.  + Varicose veins BL LE.    EXT:  No pitting edema  NEURO:  No focal deficits. No motor deficits. SKIN: No rash. No lesions of concern. MS: +TTP anterior and lateral rib 10 bilaterally. Multiple tender points along the floating ribs, costal margins. ASSESSMENT/PLAN:    1. ZAVALA (dyspnea on exertion)  Etiology is unclear. Have to consider anginal equivalent  I have encouraged patient to make an appointment with the cardiologist ASAP. - Chikis Solo MD, Cardiology, [facility]    2. CAD in native artery  Condition stability is uncertain. She is having some shortness of breath with exertion. Follow-up with cardiology soon as possible  Encourage patient to go to the emergency room should symptoms worsen. Unfortunately, the patient is statin and Zetia intolerant  - Chikis Solo MD, Cardiology, [facility]  - CBC Auto Differential; Future  - Comprehensive Metabolic Panel; Future  - Lipid Panel; Future    3. Benign essential HTN  Condition is uncontrolled  Start a low-dose of amlodipine 2.5 mg daily  Follow-up in 4 weeks for blood pressure recheck  Monitor for increased edema  - CBC Auto Differential; Future  - Comprehensive Metabolic Panel; Future    4. Chronic bilateral low back pain without sciatica  Condition is overall improved since her spinal cord stimulator  Recommend that she follow-up with Dr. Marielena Corcoran to address her back as well as some of these tender points on her ribs. She may benefit from nerve block or trigger injections. 5. CKD (chronic kidney disease), stage III ([address]  Condition is stable though due for lab  Continue to avoid NSAIDs. Stay well-hydrated  - CBC Auto Differential; Future  - Comprehensive Metabolic Panel; Future    6.  Pain syndrome, chronic  As above  Patient is currently on gabapentin but may

## 2020-03-02 NOTE — TELEPHONE ENCOUNTER
Last appointment: 2/25/2020  Next appointment: 3/27/2020  Last refill: 12/04/2019 # 90 with no refill

## 2020-03-03 RX ORDER — GABAPENTIN 300 MG/1
CAPSULE ORAL
Qty: 90 CAPSULE | Refills: 0 | Status: SHIPPED | OUTPATIENT
Start: 2020-03-03 | End: 2020-06-01

## 2020-03-09 ENCOUNTER — HOSPITAL ENCOUNTER (OUTPATIENT)
Age: 80
Discharge: HOME | End: 2020-03-09
Payer: MEDICARE

## 2020-03-09 LAB
FOLATE: >20 NG/ML (ref 4.78–24.2)
VITAMIN B-12: 1003 PG/ML (ref 211–911)

## 2020-03-09 PROCEDURE — 36415 COLL VENOUS BLD VENIPUNCTURE: CPT

## 2020-03-09 PROCEDURE — 82607 VITAMIN B-12: CPT

## 2020-03-09 PROCEDURE — 82746 ASSAY OF FOLIC ACID SERUM: CPT

## 2020-03-11 ASSESSMENT — ENCOUNTER SYMPTOMS
CHEST TIGHTNESS: 0
VOMITING: 0
BLOOD IN STOOL: 0
WHEEZING: 0
COUGH: 0
FACIAL SWELLING: 0
BACK PAIN: 0
COLOR CHANGE: 0
ABDOMINAL PAIN: 0
ABDOMINAL DISTENTION: 0
EYE DISCHARGE: 0

## 2020-03-11 NOTE — PROGRESS NOTES
LUMBAR 4-5 AND PEDICLE SCREW FIXATION     LAMINECTOMY POSTERIOR THORACIC / LUMBAR Right 4/9/2019    THORACIC LAMINECTOMY WITH PADDLE LEAD AND RECHARGEBLE BATTERY IN RIGHT HIP performed by Olena Jara MD at [facility]  10/02/2019    R nasal sidewall    WI ARTHROCENTESIS ASPIR&/INJ INTERM JT/BURS W/O US N/A 10/24/2018    MIDLINE CAUDEL INJECTION WITH FLUOROSCOPY performed by Kota Land MD at [facility]  2002    stent, LAD    SKIN CANCER EXCISION Right 05/24/2018    Right dorsal hand, Dr. Leona Be N/A 3/11/2019    SPINAL CORD STIMULATOR IMPLANT TRIAL WITH FLUOROSCOPY WITH NEVRO performed by Kota Land MD at [address]      age 10        Social HIstory  Social History     Tobacco Use    Smoking status: Never Smoker    Smokeless tobacco: Never Used   Substance Use Topics    Alcohol use: No    Drug use: Never       Family History  Family History   Problem Relation Age of Onset    Diabetes Brother     Diabetes Mother     Heart Disease Mother     Heart Disease Father     Cancer Father         skin       Allergies   Allergies   Allergen Reactions    Aquaphor [Aquaphilic]     Other     Statins Other (See Comments)     Severe muscle pain    Adhesive Tape Itching and Rash     Pt Okay with paper tape       Medications:     Home Medications:  Were reviewed and are listed in nursing record. and/or listed below    Prior to Admission medications    Medication Sig Start Date End Date Taking? Authorizing Provider   irbesartan (AVAPRO) 300 MG tablet Take 1 tablet by mouth daily 3/12/20  Yes Paulette Will MD   gabapentin (NEURONTIN) 300 MG capsule TAKE 1 CAPSULE BY MOUTH EVERY NIGHT 3/3/20 6/1/20 Yes Hill James DO   traMADol (ULTRAM) 50 MG tablet Take 1 tablet by mouth every 6 hours as needed for Pain for up to 90 days.  12/20/19 3/19/20 Yes Hill James DO   vitamin E 400 UNIT capsule Take 400 Units by mouth Systolic Blood Pressure: 233 mmHg      Is BP treated: Yes      HDL Cholesterol: 47 mg/dL      Total Cholesterol: 299 mg/dL      Imaging:       Last ECG (if available):  NSR, old ant MI, IVCD    Last Stress (if available): 12/8/14   Summary    Left ventricular ejection fraction of 74 %.    The LV wall motion is normal.    There is normal isotope uptake at stress and rest. There is no evidence of    myocardial ischemia or scar. Last Cath (if available):    Last TTE/ALANNAH(if available): 4/4/19   Summary   Left ventricular cavity size is normal. There is moderate concentric left   ventricular hypertrophy. Overall left ventricular systolic function appears   low normal with an ejection fraction of 50-55%. Abnormal septal motion is   present. Normal diastolic function. Mild tricuspid regurgitation. Estimated   pulmonary artery systolic pressure is at 32 mmHg assuming a right atrial   pressure of 3 mmHg. No prior echo for comparison. Last CMR  (if available):      Assessment / Plan:     CAD in native artery  Having some shortness of breath. We will plan for Lexiscan. Benign essential HTN  Elevated, will increase the irbesartan to 300 mg daily. Continue other medications. Dyslipidemia  Previously discussed Repatha. She will read about it again and let us know if she is interested to pursue this path. She is clearly intolerant to multiple statins and Zetia. SOB (shortness of breath)  Plan for Lexiscan given symptoms and prior CAD. Had a TTE last year within normal use. No need to repeat TTE        I had the opportunity to review the clinical symptoms and presentation of Marquetta Carrel. Tobacco use was discussed with the patient and educated on the negative effects. I have asked the patient to not utilize these agents. All questions and concerns were addressed to the patient/family. Alternatives to my treatment were discussed. The note was completed using EMR.  Every effort was made to ensure accuracy; however, inadvertent computerized transcription errors may be present. Thank you for allowing me to participate in the care or [facility].  Jeanna Balderrama MD, [facility]

## 2020-03-12 ENCOUNTER — OFFICE VISIT (OUTPATIENT)
Dept: CARDIOLOGY CLINIC | Age: 80
End: 2020-03-12
Payer: MEDICARE

## 2020-03-12 VITALS
DIASTOLIC BLOOD PRESSURE: 90 MMHG | WEIGHT: 191 LBS | HEART RATE: 70 BPM | SYSTOLIC BLOOD PRESSURE: 152 MMHG | HEIGHT: 62 IN | BODY MASS INDEX: 35.15 KG/M2

## 2020-03-12 PROBLEM — R06.02 SOB (SHORTNESS OF BREATH): Status: ACTIVE | Noted: 2020-03-12

## 2020-03-12 PROCEDURE — 4040F PNEUMOC VAC/ADMIN/RCVD: CPT | Performed by: INTERNAL MEDICINE

## 2020-03-12 PROCEDURE — G8427 DOCREV CUR MEDS BY ELIG CLIN: HCPCS | Performed by: INTERNAL MEDICINE

## 2020-03-12 PROCEDURE — 1090F PRES/ABSN URINE INCON ASSESS: CPT | Performed by: INTERNAL MEDICINE

## 2020-03-12 PROCEDURE — G8482 FLU IMMUNIZE ORDER/ADMIN: HCPCS | Performed by: INTERNAL MEDICINE

## 2020-03-12 PROCEDURE — G8417 CALC BMI ABV UP PARAM F/U: HCPCS | Performed by: INTERNAL MEDICINE

## 2020-03-12 PROCEDURE — G8399 PT W/DXA RESULTS DOCUMENT: HCPCS | Performed by: INTERNAL MEDICINE

## 2020-03-12 PROCEDURE — 1123F ACP DISCUSS/DSCN MKR DOCD: CPT | Performed by: INTERNAL MEDICINE

## 2020-03-12 PROCEDURE — 1036F TOBACCO NON-USER: CPT | Performed by: INTERNAL MEDICINE

## 2020-03-12 PROCEDURE — 93000 ELECTROCARDIOGRAM COMPLETE: CPT | Performed by: INTERNAL MEDICINE

## 2020-03-12 PROCEDURE — 99214 OFFICE O/P EST MOD 30 MIN: CPT | Performed by: INTERNAL MEDICINE

## 2020-03-12 RX ORDER — IRBESARTAN 300 MG/1
300 TABLET ORAL DAILY
Qty: 90 TABLET | Refills: 1 | Status: SHIPPED | OUTPATIENT
Start: 2020-03-12 | End: 2020-08-17

## 2020-03-12 ASSESSMENT — ENCOUNTER SYMPTOMS: SHORTNESS OF BREATH: 1

## 2020-03-19 ENCOUNTER — HOSPITAL ENCOUNTER (OUTPATIENT)
Dept: NON INVASIVE DIAGNOSTICS | Age: 80
Discharge: HOME | End: 2020-03-19
Payer: MEDICARE

## 2020-03-19 LAB
LV EF: 71 %
LVEF MODALITY: NORMAL

## 2020-03-19 PROCEDURE — 6360000002 HC RX W HCPCS: Performed by: INTERNAL MEDICINE

## 2020-03-19 PROCEDURE — 93017 CV STRESS TEST TRACING ONLY: CPT | Performed by: INTERNAL MEDICINE

## 2020-03-19 PROCEDURE — 78452 HT MUSCLE IMAGE SPECT MULT: CPT | Performed by: INTERNAL MEDICINE

## 2020-03-19 PROCEDURE — A9502 TC99M TETROFOSMIN: HCPCS | Performed by: INTERNAL MEDICINE

## 2020-03-19 PROCEDURE — 3430000000 HC RX DIAGNOSTIC RADIOPHARMACEUTICAL: Performed by: INTERNAL MEDICINE

## 2020-03-19 RX ORDER — AMINOPHYLLINE DIHYDRATE 25 MG/ML
100 INJECTION, SOLUTION INTRAVENOUS ONCE
Status: COMPLETED | OUTPATIENT
Start: 2020-03-19 | End: 2020-03-19

## 2020-03-19 RX ADMIN — TETROFOSMIN 30 MILLICURIE: 1.38 INJECTION, POWDER, LYOPHILIZED, FOR SOLUTION INTRAVENOUS at 14:22

## 2020-03-19 RX ADMIN — AMINOPHYLLINE DIHYDRATE 100 MG: 25 INJECTION, SOLUTION INTRAVENOUS at 14:23

## 2020-03-19 RX ADMIN — TETROFOSMIN 10 MILLICURIE: 1.38 INJECTION, POWDER, LYOPHILIZED, FOR SOLUTION INTRAVENOUS at 13:01

## 2020-03-19 RX ADMIN — REGADENOSON 0.4 MG: 0.08 INJECTION, SOLUTION INTRAVENOUS at 14:19

## 2020-03-19 NOTE — PROGRESS NOTES
Instructed on Lexiscan Stress Test Procedure including possible side effects/ adverse reactions. Patient verbalizes  understanding and denies having any questions . See [address] [place] [address] Rd Cardiology. Aminophylline given per lexiscan protocol in stress lab for c/o persistant nausea.

## 2020-03-26 PROBLEM — Z12.11 SPECIAL SCREENING FOR MALIGNANT NEOPLASM OF COLON: Status: RESOLVED | Noted: 2020-02-25 | Resolved: 2020-03-26

## 2020-03-27 ENCOUNTER — TELEMEDICINE (OUTPATIENT)
Dept: INTERNAL MEDICINE CLINIC | Age: 80
End: 2020-03-27
Payer: MEDICARE

## 2020-03-27 PROCEDURE — 1123F ACP DISCUSS/DSCN MKR DOCD: CPT | Performed by: INTERNAL MEDICINE

## 2020-03-27 PROCEDURE — G8428 CUR MEDS NOT DOCUMENT: HCPCS | Performed by: INTERNAL MEDICINE

## 2020-03-27 PROCEDURE — 99213 OFFICE O/P EST LOW 20 MIN: CPT | Performed by: INTERNAL MEDICINE

## 2020-03-27 PROCEDURE — 4040F PNEUMOC VAC/ADMIN/RCVD: CPT | Performed by: INTERNAL MEDICINE

## 2020-03-27 PROCEDURE — 1090F PRES/ABSN URINE INCON ASSESS: CPT | Performed by: INTERNAL MEDICINE

## 2020-03-27 PROCEDURE — G8399 PT W/DXA RESULTS DOCUMENT: HCPCS | Performed by: INTERNAL MEDICINE

## 2020-03-27 ASSESSMENT — ENCOUNTER SYMPTOMS
CHEST TIGHTNESS: 0
ABDOMINAL PAIN: 0
WHEEZING: 0
NAUSEA: 0
DIARRHEA: 0
BACK PAIN: 1

## 2020-03-27 NOTE — PATIENT INSTRUCTIONS
Patient Education        High Blood Pressure: Care Instructions  Overview    It's normal for blood pressure to go up and down throughout the day. But if it stays up, you have high blood pressure. Another name for high blood pressure is hypertension. Despite what a lot of people think, high blood pressure usually doesn't cause headaches or make you feel dizzy or lightheaded. It usually has no symptoms. But it does increase your risk of stroke, heart attack, and other problems. You and your doctor will talk about your risks of these problems based on your blood pressure. Your doctor will give you a goal for your blood pressure. Your goal will be based on your health and your age. Lifestyle changes, such as eating healthy and being active, are always important to help lower blood pressure. You might also take medicine to reach your blood pressure goal.  Follow-up care is a key part of your treatment and safety. Be sure to make and go to all appointments, and call your doctor if you are having problems. It's also a good idea to know your test results and keep a list of the medicines you take. How can you care for yourself at home? Medical treatment  · If you stop taking your medicine, your blood pressure will go back up. You may take one or more types of medicine to lower your blood pressure. Be safe with medicines. Take your medicine exactly as prescribed. Call your doctor if you think you are having a problem with your medicine. · Talk to your doctor before you start taking aspirin every day. Aspirin can help certain people lower their risk of a heart attack or stroke. But taking aspirin isn't right for everyone, because it can cause serious bleeding. · See your doctor regularly. You may need to see the doctor more often at first or until your blood pressure comes down.   · If you are taking blood pressure medicine, talk to your doctor before you take decongestants or anti-inflammatory medicine, such as ibuprofen. Some of these medicines can raise blood pressure. · Learn how to check your blood pressure at home. Lifestyle changes  · Stay at a healthy weight. This is especially important if you put on weight around the waist. Losing even 10 pounds can help you lower your blood pressure. · If your doctor recommends it, get more exercise. Walking is a good choice. Bit by bit, increase the amount you walk every day. Try for at least 30 minutes on most days of the week. You also may want to swim, bike, or do other activities. · Avoid or limit alcohol. Talk to your doctor about whether you can drink any alcohol. · Try to limit how much sodium you eat to less than 2,300 milligrams (mg) a day. Your doctor may ask you to try to eat less than 1,500 mg a day. · Eat plenty of fruits (such as bananas and oranges), vegetables, legumes, whole grains, and low-fat dairy products. · Lower the amount of saturated fat in your diet. Saturated fat is found in animal products such as milk, cheese, and meat. Limiting these foods may help you lose weight and also lower your risk for heart disease. · Do not smoke. Smoking increases your risk for heart attack and stroke. If you need help quitting, talk to your doctor about stop-smoking programs and medicines. These can increase your chances of quitting for good. When should you call for help? Call  911 anytime you think you may need emergency care. This may mean having symptoms that suggest that your blood pressure is causing a serious heart or blood vessel problem. Your blood pressure may be over 180/120.   For example, call  911 if:    · You have symptoms of a heart attack. These may include:  ? Chest pain or pressure, or a strange feeling in the chest.  ? Sweating. ? Shortness of breath. ? Nausea or vomiting. ? Pain, pressure, or a strange feeling in the back, neck, jaw, or upper belly or in one or both shoulders or arms. ? Lightheadedness or sudden weakness.   ? A fast or irregular heartbeat.     · You have symptoms of a stroke. These may include:  ? Sudden numbness, tingling, weakness, or loss of movement in your face, arm, or leg, especially on only one side of your body. ? Sudden vision changes. ? Sudden trouble speaking. ? Sudden confusion or trouble understanding simple statements. ? Sudden problems with walking or balance. ? A sudden, severe headache that is different from past headaches.     · You have severe back or belly pain.    Do not wait until your blood pressure comes down on its own. Get help right away.   Call your doctor now or seek immediate care if:    · Your blood pressure is much higher than normal (such as 180/120 or higher), but you don't have symptoms.     · You think high blood pressure is causing symptoms, such as:  ? Severe headache.  ? Blurry vision.    Watch closely for changes in your health, and be sure to contact your doctor if:    · Your blood pressure measures higher than your doctor recommends at least 2 times. That means the top number is higher or the bottom number is higher, or both.     · You think you may be having side effects from your blood pressure medicine. Where can you learn more? Go to https://Mod[employer] [email or web address].[email or web address] org and sign in to your [employer] account. Enter Z729 in the [employer] box to learn more about \"High Blood Pressure: Care Instructions. \"     If you do not have an account, please click on the \"Sign Up Now\" link. Current as of: December 15, 2019Content Version: 12.4  © 1722-7697 Healthwise, Incorporated. Care instructions adapted under license by [facility] ([facility]). If you have questions about a medical condition or this instruction, always ask your healthcare professional. [place] [address] any warranty or liability for your use of this information.          Patient Education        Low Sodium Diet (2,000 Milligram): Care Instructions  Your Care Instructions    Too much sodium causes frozen vegetables without added sauces. Buy low-sodium versions of canned vegetables, soups, and other canned goods. Prepare low-sodium meals  · Cut back on the amount of salt you use in cooking. This will help you adjust to the taste. Do not add salt after cooking. One teaspoon of salt has about 2,300 mg of sodium. · Take the salt shaker off the table. · Flavor your food with garlic, lemon juice, onion, vinegar, herbs, and spices. Do not use soy sauce, lite soy sauce, steak sauce, onion salt, garlic salt, celery salt, mustard, or ketchup on your food. · Use low-sodium salad dressings, sauces, and ketchup. Or make your own salad dressings and sauces without adding salt. · Use less salt (or none) when recipes call for it. You can often use half the salt a recipe calls for without losing flavor. Other foods such as rice, pasta, and grains do not need added salt. · Rinse canned vegetables, and cook them in fresh water. This removes some--but not all--of the salt. · Avoid water that is naturally high in sodium or that has been treated with water softeners, which add sodium. Call your local water company to find out the sodium content of your water supply. If you buy bottled water, read the label and choose a sodium-free brand. Avoid high-sodium foods  · Avoid eating:  ? Smoked, cured, salted, and canned meat, fish, and poultry. ? Ham, sweeney, hot dogs, and luncheon meats. ? Regular, hard, and processed cheese and regular peanut butter. ? Crackers with salted tops, and other salted snack foods such as pretzels, chips, and salted popcorn. ? Frozen prepared meals, unless labeled low-sodium. ? Canned and dried soups, broths, and bouillon, unless labeled sodium-free or low-sodium. ? Canned vegetables, unless labeled sodium-free or low-sodium. ? [place] fries, pizza, tacos, and other fast foods.   ? Pickles, olives, ketchup, and other condiments, especially soy sauce, unless labeled sodium-free or low-sodium. Where can you learn more? Go to https://chpepiceweb.Agile. org and sign in to your [employer] account. Enter C365 in the [facility] box to learn more about \"Low Sodium Diet (2,000 Milligram): Care Instructions. \"     If you do not have an account, please click on the \"Sign Up Now\" link. Current as of: August 21, 2019Content Version: 12.4  © 6120-7063 Healthwise, Incorporated. Care instructions adapted under license by [facility] ([facility]). If you have questions about a medical condition or this instruction, always ask your healthcare professional. [address] any warranty or liability for your use of this information. Patient Education        DASH Diet: Care Instructions  Your Care Instructions    The DASH diet is an eating plan that can help lower your blood pressure. DASH stands for Dietary Approaches to Stop Hypertension. Hypertension is high blood pressure. The DASH diet focuses on eating foods that are high in calcium, potassium, and magnesium. These nutrients can lower blood pressure. The foods that are highest in these nutrients are fruits, vegetables, low-fat dairy products, nuts, seeds, and legumes. But taking calcium, potassium, and magnesium supplements instead of eating foods that are high in those nutrients does not have the same effect. The DASH diet also includes whole grains, fish, and poultry. The DASH diet is one of several lifestyle changes your doctor may recommend to lower your high blood pressure. Your doctor may also want you to decrease the amount of sodium in your diet. Lowering sodium while following the DASH diet can lower blood pressure even further than just the DASH diet alone. Follow-up care is a key part of your treatment and safety. Be sure to make and go to all appointments, and call your doctor if you are having problems. It's also a good idea to know your test results and keep a list of the medicines you take.   How can you care for yourself at home? Following the DASH diet  · Eat 4 to 5 servings of fruit each day. A serving is 1 medium-sized piece of fruit, ½ cup chopped or canned fruit, 1/4 cup dried fruit, or 4 ounces (½ cup) of fruit juice. Choose fruit more often than fruit juice. · Eat 4 to 5 servings of vegetables each day. A serving is 1 cup of lettuce or raw leafy vegetables, ½ cup of chopped or cooked vegetables, or 4 ounces (½ cup) of vegetable juice. Choose vegetables more often than vegetable juice. · Get 2 to 3 servings of low-fat and fat-free dairy each day. A serving is 8 ounces of milk, 1 cup of yogurt, or 1 ½ ounces of cheese. · Eat 6 to 8 servings of grains each day. A serving is 1 slice of bread, 1 ounce of dry cereal, or ½ cup of cooked rice, pasta, or cooked cereal. Try to choose whole-grain products as much as possible. · Limit lean meat, poultry, and fish to 2 servings each day. A serving is 3 ounces, about the size of a deck of cards. · Eat 4 to 5 servings of nuts, seeds, and legumes (cooked dried beans, lentils, and split peas) each week. A serving is 1/3 cup of nuts, 2 tablespoons of seeds, or ½ cup of cooked beans or peas. · Limit fats and oils to 2 to 3 servings each day. A serving is 1 teaspoon of vegetable oil or 2 tablespoons of salad dressing. · Limit sweets and added sugars to 5 servings or less a week. A serving is 1 tablespoon jelly or jam, ½ cup sorbet, or 1 cup of lemonade. · Eat less than 2,300 milligrams (mg) of sodium a day. If you limit your sodium to 1,500 mg a day, you can lower your blood pressure even more. Tips for success  · Start small. Do not try to make dramatic changes to your diet all at once. You might feel that you are missing out on your favorite foods and then be more likely to not follow the plan. Make small changes, and stick with them. Once those changes become habit, add a few more changes. · Try some of the following:  ?  Make it a goal to eat a fruit or vegetable at every meal and at snacks. This will make it easy to get the recommended amount of fruits and vegetables each day. ? Try yogurt topped with fruit and nuts for a snack or healthy dessert. ? Add lettuce, tomato, cucumber, and onion to sandwiches. ? Combine a ready-made pizza crust with low-fat mozzarella cheese and lots of vegetable toppings. Try using tomatoes, squash, spinach, broccoli, carrots, cauliflower, and onions. ? Have a variety of cut-up vegetables with a low-fat dip as an appetizer instead of chips and dip. ? Sprinkle sunflower seeds or chopped almonds over salads. Or try adding chopped walnuts or almonds to cooked vegetables. ? Try some vegetarian meals using beans and peas. Add garbanzo or kidney beans to salads. Make burritos and tacos with mashed taylor beans or black beans. Where can you learn more? Go to https://Eigentajonah.[email or web address] org and sign in to your [employer] account. Enter X186 in the [facility] box to learn more about \"DASH Diet: Care Instructions. \"     If you do not have an account, please click on the \"Sign Up Now\" link. Current as of: December 15, 2019Content Version: 12.4  © 0504-2782 Healthwise, Incorporated. Care instructions adapted under license by [facility] ([facility]). If you have questions about a medical condition or this instruction, always ask your healthcare professional. [place] [address] any warranty or liability for your use of this information.

## 2020-03-27 NOTE — PROGRESS NOTES
3/27/2020    TELEHEALTH EVALUATION -- Audio/Visual (During IBLTF-19 public health emergency)      Chief Complaint   Patient presents with    Hypertension    Coronary Artery Disease    Hyperlipidemia    1 Month Follow-Up         HPI:    Heber Oconnorch (:  1940) has requested an audio/video evaluation for the following concern(s):    HTN, Hyperlipidemia, CAD, Results. This is a 71-year-old female who is being seen via a virtual video visit due to the current novel coronavirus emergency. She has been seen for a short interval follow-up regarding her hypertension, coronary artery disease and recent visit with a cardiologist.  Patient saw Dr. Eric Layne. He was also concerned that she had not been seen in some time. He reviewed her prior history of angioplasty in 2016. She did report some exertional dyspnea that was relieved with rest.  He ordered a stress Myoview. The latter is reviewed. The stress Myoview showed no evidence of reversible ischemia. He also expressed concern about significant hyperlipidemia. Her last total cholesterol was 299 and her LDL was 233 with a triglyceride level of 303. Patient is statin and Zetia intolerant. They discussed Repatha. Patient is reluctant to start this medication. She has been reading some of the potential side effects. She continues to consider this. We did review the potential side effects but also the benefits of the medication. Her home blood pressure readings are slightly improved since Dr. Eric Layne increased her Avapro to 300 mg daily. Her medications are reviewed. Review of Systems   Constitutional: Negative for activity change and fatigue. Respiratory: Negative for chest tightness and wheezing. Cardiovascular: Negative for chest pain and leg swelling. Dyspnea with exertion-- no worse   Gastrointestinal: Negative for abdominal pain, diarrhea and nausea. Genitourinary: Negative for difficulty urinating.    Musculoskeletal: Positive for back pain. Pain on outside of the hips. She will called Dr. Zurdo Moore. Back pain much better since her SCS. Neurological: Negative for dizziness, syncope and numbness. Prior to Visit Medications    Medication Sig Taking? Authorizing Provider   irbesartan (AVAPRO) 300 MG tablet Take 1 tablet by mouth daily  Nelson Nye MD   gabapentin (NEURONTIN) 300 MG capsule TAKE 1 CAPSULE BY MOUTH EVERY NIGHT  Hill James DO   fluticasone (FLONASE) 50 MCG/ACT nasal spray USE 1 SPRAY IEN ONCE DAILY  Historical Provider, MD   vitamin E 400 UNIT capsule Take 400 Units by mouth nightly  Historical Provider, MD   bisoprolol-hydrochlorothiazide (ZIAC) 10-6.25 MG per tablet TAKE 1 TABLET DAILY  Hill James DO   hydrochlorothiazide (MICROZIDE) 12.5 MG capsule TAKE 1 CAPSULE DAILY  Hill James DO   aspirin 81 MG tablet Take 81 mg by mouth daily  Historical Provider, MD   Multiple Vitamins-Minerals (THERAPEUTIC MULTIVITAMIN-MINERALS) tablet Take 1 tablet by mouth daily  Historical Provider, MD   acetaminophen (TYLENOL) 325 MG tablet Take 650 mg by mouth every 6 hours as needed for Pain  Historical Provider, MD   melatonin (RA MELATONIN) 3 MG TABS tablet Take 1 tablet by mouth nightly. Hill James DO   Calcium-Vitamin D (CALTRATE 600 PLUS-VIT D PO) Take  by mouth.   Historical Provider, MD   [facility] Natural Products (OSTEO BI-FLEX TRIPLE STRENGTH PO) Take 1 tablet by mouth 2 times daily   Historical Provider, MD       Past Medical History:   Diagnosis Date    Acquired spondylolisthesis of lumbosacral region     Bilateral primary osteoarthritis of hip     CAD (coronary artery disease)     one stent in heart    Chronic bilateral low back pain without sciatica 2/8/2018    CKD (chronic kidney disease), stage III ([facility] [address]     DDD (degenerative disc disease), lumbar     Dr. Eliot Dukes January GERD (gastroesophageal reflux disease)     Headache(784.0)     Heel spur

## 2020-04-03 ENCOUNTER — OFFICE VISIT (OUTPATIENT)
Dept: ORTHOPEDIC SURGERY | Age: 80
End: 2020-04-03
Payer: MEDICARE

## 2020-04-03 VITALS
HEART RATE: 76 BPM | HEIGHT: 61 IN | BODY MASS INDEX: 36.05 KG/M2 | WEIGHT: 190.92 LBS | DIASTOLIC BLOOD PRESSURE: 70 MMHG | SYSTOLIC BLOOD PRESSURE: 128 MMHG

## 2020-04-03 PROCEDURE — 4040F PNEUMOC VAC/ADMIN/RCVD: CPT | Performed by: PHYSICAL MEDICINE & REHABILITATION

## 2020-04-03 PROCEDURE — G8427 DOCREV CUR MEDS BY ELIG CLIN: HCPCS | Performed by: PHYSICAL MEDICINE & REHABILITATION

## 2020-04-03 PROCEDURE — G8417 CALC BMI ABV UP PARAM F/U: HCPCS | Performed by: PHYSICAL MEDICINE & REHABILITATION

## 2020-04-03 PROCEDURE — 20611 DRAIN/INJ JOINT/BURSA W/US: CPT | Performed by: PHYSICAL MEDICINE & REHABILITATION

## 2020-04-03 PROCEDURE — 99214 OFFICE O/P EST MOD 30 MIN: CPT | Performed by: PHYSICAL MEDICINE & REHABILITATION

## 2020-04-03 PROCEDURE — 1090F PRES/ABSN URINE INCON ASSESS: CPT | Performed by: PHYSICAL MEDICINE & REHABILITATION

## 2020-04-03 PROCEDURE — 1036F TOBACCO NON-USER: CPT | Performed by: PHYSICAL MEDICINE & REHABILITATION

## 2020-04-03 PROCEDURE — 1123F ACP DISCUSS/DSCN MKR DOCD: CPT | Performed by: PHYSICAL MEDICINE & REHABILITATION

## 2020-04-03 PROCEDURE — G8399 PT W/DXA RESULTS DOCUMENT: HCPCS | Performed by: PHYSICAL MEDICINE & REHABILITATION

## 2020-04-03 RX ORDER — TRIAMCINOLONE ACETONIDE 40 MG/ML
40 INJECTION, SUSPENSION INTRA-ARTICULAR; INTRAMUSCULAR ONCE
Status: COMPLETED | OUTPATIENT
Start: 2020-04-03 | End: 2020-04-03

## 2020-04-03 RX ORDER — TIZANIDINE 2 MG/1
2 TABLET ORAL 2 TIMES DAILY
Qty: 60 TABLET | Refills: 0 | Status: SHIPPED | OUTPATIENT
Start: 2020-04-03 | End: 2020-06-25 | Stop reason: ALTCHOICE

## 2020-04-03 RX ADMIN — TRIAMCINOLONE ACETONIDE 40 MG: 40 INJECTION, SUSPENSION INTRA-ARTICULAR; INTRAMUSCULAR at 09:56

## 2020-04-03 NOTE — PROGRESS NOTES
patient describes that her pain is 50% relieved from the reprogramming that was completed. She does have some pain in the mid back that radiates in between the rib cage that she has had for a number of years that does seem to be worsening however her bilateral hip pain is worse at this time. The patient does take Tramadol for this issue.        Associated signs and symptoms:   Neurogenic bowel or bladder symptoms:  no   Perceived weakness:  no   Difficulty walking:  yes              Past Medical History:   Past Medical History:   Diagnosis Date    Acquired spondylolisthesis of lumbosacral region     Bilateral primary osteoarthritis of hip     CAD (coronary artery disease)     one stent in heart    Chronic bilateral low back pain without sciatica 2/8/2018    CKD (chronic kidney disease), stage III ([address]     DDD (degenerative disc disease), lumbar     Dr. Sujit Cobian GERD (gastroesophageal reflux disease)     Headache(784.0)     Heel spur 08/2016    History of lumbar spinal fusion     Hyperlipidemia     Hypertension     IBS (irritable bowel syndrome)     Lyme disease 2009    suspected    MVP (mitral valve prolapse)     Neuroma nos     Osteoarthritis     Osteopenia     Post-laminectomy syndrome     Rotator cuff tear     Dr. Brittany Blunt Squamous cell cancer of skin of right hand 05/2018    squamous cell carcinoma    Stress incontinence, female     Urinary incontinence       Past Surgical History:     Past Surgical History:   Procedure Laterality Date    BREAST SURGERY      cyst removal    COLONOSCOPY  2006    CORONARY ANGIOPLASTY WITH STENT PLACEMENT      x1    LAMINECTOMY  5/19/14    LUMBAR 4-5 DECOMPRESSIVE LAMINECTOMY AND POSTERIOR LATERAL FUSION LUMBAR 4-5 AND PEDICLE SCREW FIXATION     LAMINECTOMY POSTERIOR THORACIC / LUMBAR Right 4/9/2019    THORACIC LAMINECTOMY WITH PADDLE LEAD AND RECHARGEBLE BATTERY IN RIGHT HIP performed by Frankey Downy, MD at drink alcohol or use drugs. Family History:   Family History   Problem Relation Age of Onset    Diabetes Brother     Diabetes Mother     Heart Disease Mother     Heart Disease Father     Cancer Father         skin       REVIEW OF SYSTEMS:   CONSTITUTIONAL: Denies unexplained weight loss, fevers, chills or fatigue  NEUROLOGICAL: Denies unsteady gait or progressive weakness  MUSCULOSKELETAL: Denies joint swelling or redness  GI: Denies nausea, vomiting, diarrhea   : Denies bowel or bladder issues       PHYSICAL EXAM:    Vitals: Blood pressure 128/70, pulse 76, height 5' 1.5\" (1.562 m), weight 190 lb 14.7 oz (86.6 kg). GENERAL EXAM:  · General Apparence: Patient is adequately groomed with no evidence of malnutrition. · Psychiatric: Orientation: The patient is oriented to time, place and person. The patient's mood and affect are appropriate   · Vascular: Examination reveals no swelling and palpation reveals no tenderness in upper or lower extremities. Good capillary refill. · The lymphatic examination of the neck, axillae and groin reveals all areas to be without enlargement or induration  · Sensation is intact without deficit in the upper and lower extremities to light touch and pinprick  · Coordination of the upper and lower extremities are normal.  · RIGHT UPPER EXTREMITY:  Inspection/examination of the right upper extremity does not show any tenderness, deformity or injury. Range of motion is unremarkable and pain-free. There is no gross instability. There are no rashes, ulcerations or lesions. Strength and tone are normal. No atrophy or abnormal movements are noted. · LEFT UPPER EXTREMITY: Inspection/examination of the left upper extremity does not show any tenderness, deformity or injury. Range of motion is unremarkable and pain-free. There is no gross instability. There are no rashes, ulcerations or lesions.  Strength and tone are normal. No atrophy or abnormal movements are 211 - 911 pg/mL    Folate >20.00 4.78 - 24.20 ng/mL     Impression:       1. Greater trochanteric bursitis of both hips    2. Lumbar post-laminectomy syndrome    3. Chronic pain syndrome    4. DDD (degenerative disc disease), lumbar    5. Spondylosis of lumbar region without myelopathy or radiculopathy        Plan:  Clinical Course: Above diagnoses are worsening    I discussed the diagnosis and the treatment options with Santosh Saunders today. In Summary:  The various treatment options were outlined and discussed with Santosh Saunders including:  Conservative care options: physical therapy, ice, medications, bracing, and activity modification. The indications for therapeutic injections. The indications for additional imaging/laboratory studies. The indications for (possible future) interventions. After considering the various options discussed, Santosh Saunders elected to pursue a course of treatment that includes the followin. Medications: Add Zanaflex 2 mg po bid#60. Risks benefits alternatives were discussed. She was counseled not to take this medicine and drive. 2. PT:  Encouraged to continue with Home exercise program.    3. Further studies: No further studies. 4. Interventional:  After risks benefits alternatives discussed a right greater trochanter bursa injection was undertaken the bedside. The skin overlying the right hip was prepped with ChloraPrep ×3. Ultrasound guidance was utilized with a curvilinear array transducer and in-plane technique. A mixture of 40 mg of Kenalog with 2 ml of 1% lidocaine was drawn up and instilled over the most tender point of the right greater trochanter with a 22-gauge needle. The needle was removed after the mixture was instilled and a Band-Aid was applied. The identical procedure was repeated on the left.      5. Follow up:  2-3 weeks      Santosh Saunders was instructed to call the office if her symptoms worsen or if new

## 2020-04-17 RX ORDER — TRAMADOL HYDROCHLORIDE 50 MG/1
50 TABLET ORAL EVERY 4 HOURS PRN
Qty: 360 TABLET | Refills: 0 | Status: CANCELLED | OUTPATIENT
Start: 2020-04-17 | End: 2020-07-16

## 2020-04-17 RX ORDER — TRAMADOL HYDROCHLORIDE 50 MG/1
50 TABLET ORAL EVERY 6 HOURS PRN
Qty: 360 TABLET | Refills: 0 | Status: SHIPPED | OUTPATIENT
Start: 2020-04-17 | End: 2020-08-21 | Stop reason: SDUPTHER

## 2020-04-17 NOTE — TELEPHONE ENCOUNTER
Last appointment: 2/25/2020  Next appointment: Visit date not found  Last refill: 12/20/19      [place]  12/20/19

## 2020-05-01 ENCOUNTER — VIRTUAL VISIT (OUTPATIENT)
Dept: ORTHOPEDIC SURGERY | Age: 80
End: 2020-05-01
Payer: MEDICARE

## 2020-05-01 ENCOUNTER — TELEPHONE (OUTPATIENT)
Dept: ORTHOPEDIC SURGERY | Age: 80
End: 2020-05-01

## 2020-05-01 VITALS — RESPIRATION RATE: 12 BRPM | HEIGHT: 61 IN | BODY MASS INDEX: 36.05 KG/M2 | WEIGHT: 190.92 LBS

## 2020-05-01 PROCEDURE — G8399 PT W/DXA RESULTS DOCUMENT: HCPCS | Performed by: PHYSICAL MEDICINE & REHABILITATION

## 2020-05-01 PROCEDURE — 1090F PRES/ABSN URINE INCON ASSESS: CPT | Performed by: PHYSICAL MEDICINE & REHABILITATION

## 2020-05-01 PROCEDURE — 99214 OFFICE O/P EST MOD 30 MIN: CPT | Performed by: PHYSICAL MEDICINE & REHABILITATION

## 2020-05-01 PROCEDURE — 4040F PNEUMOC VAC/ADMIN/RCVD: CPT | Performed by: PHYSICAL MEDICINE & REHABILITATION

## 2020-05-01 PROCEDURE — G8427 DOCREV CUR MEDS BY ELIG CLIN: HCPCS | Performed by: PHYSICAL MEDICINE & REHABILITATION

## 2020-05-01 PROCEDURE — 1123F ACP DISCUSS/DSCN MKR DOCD: CPT | Performed by: PHYSICAL MEDICINE & REHABILITATION

## 2020-05-01 RX ORDER — TIZANIDINE 4 MG/1
4 TABLET ORAL 2 TIMES DAILY
Qty: 60 TABLET | Refills: 1 | Status: SHIPPED | OUTPATIENT
Start: 2020-05-01 | End: 2020-06-30

## 2020-05-01 NOTE — PROGRESS NOTES
every 6 hours as needed for Pain for up to 90 days. , Disp: 360 tablet, Rfl: 0    tiZANidine (ZANAFLEX) 2 MG tablet, Take 1 tablet by mouth 2 times daily, Disp: 60 tablet, Rfl: 0    irbesartan (AVAPRO) 300 MG tablet, Take 1 tablet by mouth daily, Disp: 90 tablet, Rfl: 1    gabapentin (NEURONTIN) 300 MG capsule, TAKE 1 CAPSULE BY MOUTH EVERY NIGHT, Disp: 90 capsule, Rfl: 0    vitamin E 400 UNIT capsule, Take 400 Units by mouth nightly, Disp: , Rfl:     bisoprolol-hydrochlorothiazide (ZIAC) 10-6.25 MG per tablet, TAKE 1 TABLET DAILY, Disp: 90 tablet, Rfl: 3    hydrochlorothiazide (MICROZIDE) 12.5 MG capsule, TAKE 1 CAPSULE DAILY, Disp: 90 capsule, Rfl: 3    aspirin 81 MG tablet, Take 81 mg by mouth daily, Disp: , Rfl:     Multiple Vitamins-Minerals (THERAPEUTIC MULTIVITAMIN-MINERALS) tablet, Take 1 tablet by mouth daily, Disp: , Rfl:     acetaminophen (TYLENOL) 325 MG tablet, Take 650 mg by mouth every 6 hours as needed for Pain, Disp: , Rfl:     melatonin (RA MELATONIN) 3 MG TABS tablet, Take 1 tablet by mouth nightly., Disp: , Rfl: 3    Calcium-Vitamin D (CALTRATE 600 PLUS-VIT D PO), Take  by mouth., Disp: , Rfl:     Misc Natural Products (OSTEO BI-FLEX TRIPLE STRENGTH PO), Take 1 tablet by mouth 2 times daily , Disp: , Rfl:   Allergies:  Aquaphor [aquaphilic]; Other; Statins; and Adhesive tape  Social History:    reports that she has never smoked. She has never used smokeless tobacco. She reports that she does not drink alcohol or use drugs.   Family History:   Family History   Problem Relation Age of Onset    Diabetes Brother     Diabetes Mother     Heart Disease Mother     Heart Disease Father     Cancer Father         skin       REVIEW OF SYSTEMS:   CONSTITUTIONAL: Denies unexplained weight loss, fevers, chills or fatigue  NEUROLOGICAL: Denies unsteady gait or progressive weakness  MUSCULOSKELETAL: Denies joint swelling or redness  GI: Denies nausea, vomiting, diarrhea   : Denies bowel or LVEF MODALITY Nuclear      Impression:       1. Sacroiliac joint dysfunction of both sides    2. Lumbar post-laminectomy syndrome    3. Chronic pain syndrome    4. Greater trochanteric bursitis of both hips        Plan:  Clinical Course: Above diagnoses are worsening    I discussed the diagnosis and the treatment options with Ayde Cramer today. In Summary:  The various treatment options were outlined and discussed with Ayde Cramer including:  Conservative care options: physical therapy, ice, medications, bracing, and activity modification. The indications for therapeutic injections. The indications for additional imaging/laboratory studies. The indications for (possible future) interventions. After considering the various options discussed, Ayde Cramer elected to pursue a course of treatment that includes the followin. Medications:  Refill zanaflex 4 mg po bid#60    2. PT:  Prescribed home exercise program.    3. Further studies: No further studies. 4. Interventional:  We discussed pursuing a bilateral Sacroiliac joint injection as SI joint pathology is suspected as the etiology of the chronic low back/hip pain. There has been failure of non-invasive and conservative treatment including physical therapy/home exercise program, rest, NSAIDs or acetaminophen. Risks include but are not limited to bleeding, infection, nerve injury, increase in pain and lack of pain relief. The patient verbalized understanding and would like to proceed. 5. Follow up:  4-6 weeks      Ayde Cramer was instructed to call the office if her symptoms worsen or if new symptoms appear prior to the next scheduled visit. She is specifically instructed to contact the office between now & her scheduled appointment if she has concerns related to her condition or if she needs assistance in scheduling the above tests.  She is welcome to call for an appointment sooner if she has any additional

## 2020-05-26 ENCOUNTER — TELEPHONE (OUTPATIENT)
Dept: ORTHOPEDIC SURGERY | Age: 80
End: 2020-05-26

## 2020-05-26 NOTE — TELEPHONE ENCOUNTER
DOS   05/29/2020  CPT   44170.50   94042.26  30905  DX   M53.3   M96.1   G89.4   M70.61   M70.62  OP SX AUTH  NPR         VALID   05/29/2020-08/27/2020    Bilateral SI Joint Injection    [address] [place] [address] Rd:   [facility]

## 2020-06-01 RX ORDER — GABAPENTIN 300 MG/1
CAPSULE ORAL
Qty: 90 CAPSULE | Refills: 0 | Status: SHIPPED | OUTPATIENT
Start: 2020-06-01 | End: 2020-08-31

## 2020-06-01 NOTE — TELEPHONE ENCOUNTER
Last appointment: 2/25/2020  Next appointment: Visit date not found  Last refill: 03/03/2020 # 90 with no refills

## 2020-06-03 ENCOUNTER — TELEPHONE (OUTPATIENT)
Dept: ORTHOPEDIC SURGERY | Age: 80
End: 2020-06-03

## 2020-06-25 ENCOUNTER — OFFICE VISIT (OUTPATIENT)
Dept: CARDIOLOGY CLINIC | Age: 80
End: 2020-06-25
Payer: MEDICARE

## 2020-06-25 VITALS
HEART RATE: 84 BPM | TEMPERATURE: 97.3 F | SYSTOLIC BLOOD PRESSURE: 120 MMHG | HEIGHT: 64 IN | DIASTOLIC BLOOD PRESSURE: 80 MMHG | BODY MASS INDEX: 33.43 KG/M2 | OXYGEN SATURATION: 94 % | WEIGHT: 195.8 LBS

## 2020-06-25 PROCEDURE — 1036F TOBACCO NON-USER: CPT | Performed by: INTERNAL MEDICINE

## 2020-06-25 PROCEDURE — 99214 OFFICE O/P EST MOD 30 MIN: CPT | Performed by: INTERNAL MEDICINE

## 2020-06-25 PROCEDURE — 1123F ACP DISCUSS/DSCN MKR DOCD: CPT | Performed by: INTERNAL MEDICINE

## 2020-06-25 PROCEDURE — G8427 DOCREV CUR MEDS BY ELIG CLIN: HCPCS | Performed by: INTERNAL MEDICINE

## 2020-06-25 PROCEDURE — G8417 CALC BMI ABV UP PARAM F/U: HCPCS | Performed by: INTERNAL MEDICINE

## 2020-06-25 PROCEDURE — 4040F PNEUMOC VAC/ADMIN/RCVD: CPT | Performed by: INTERNAL MEDICINE

## 2020-06-25 PROCEDURE — G8399 PT W/DXA RESULTS DOCUMENT: HCPCS | Performed by: INTERNAL MEDICINE

## 2020-06-25 PROCEDURE — 1090F PRES/ABSN URINE INCON ASSESS: CPT | Performed by: INTERNAL MEDICINE

## 2020-06-25 ASSESSMENT — ENCOUNTER SYMPTOMS
FACIAL SWELLING: 0
ABDOMINAL DISTENTION: 0
WHEEZING: 0
VOMITING: 0
BACK PAIN: 0
CHEST TIGHTNESS: 0
SHORTNESS OF BREATH: 0
ABDOMINAL PAIN: 0
COUGH: 0
BLOOD IN STOOL: 0
COLOR CHANGE: 0
EYE DISCHARGE: 0

## 2020-06-25 NOTE — PROGRESS NOTES
DECOMPRESSIVE LAMINECTOMY AND POSTERIOR LATERAL FUSION LUMBAR 4-5 AND PEDICLE SCREW FIXATION     LAMINECTOMY POSTERIOR THORACIC / LUMBAR Right 4/9/2019    THORACIC LAMINECTOMY WITH PADDLE LEAD AND RECHARGEBLE BATTERY IN RIGHT HIP performed by Karina Lemos MD at [facility]  10/02/2019    R nasal sidewall    NE ARTHROCENTESIS ASPIR&/INJ INTERM JT/BURS W/O US N/A 10/24/2018    MIDLINE CAUDEL INJECTION WITH FLUOROSCOPY performed by Ernesto Thorne MD at [facility]  2002    stent, LAD    SKIN CANCER EXCISION Right 05/24/2018    Right dorsal hand, Dr. Anastacia Solo N/A 3/11/2019    SPINAL CORD STIMULATOR IMPLANT TRIAL WITH FLUOROSCOPY WITH NEVRO performed by Ernesto Thorne MD at [facility]      age 10        Social HIstory  Social History     Tobacco Use    Smoking status: Never Smoker    Smokeless tobacco: Never Used   Substance Use Topics    Alcohol use: No    Drug use: Never       Family History  Family History   Problem Relation Age of Onset    Diabetes Brother     Diabetes Mother     Heart Disease Mother     Heart Disease Father     Cancer Father         skin       Allergies   Allergies   Allergen Reactions    Aquaphor [Aquaphilic]     Other     Statins Other (See Comments)     Severe muscle pain    Adhesive Tape Itching and Rash     Pt Okay with paper tape       Medications:     Home Medications:  Were reviewed and are listed in nursing record. and/or listed below    Prior to Admission medications    Medication Sig Start Date End Date Taking? Authorizing Provider   gabapentin (NEURONTIN) 300 MG capsule TAKE 1 CAPSULE BY MOUTH EVERY NIGHT 6/1/20 8/30/20 Yes Hill James DO   traMADol (ULTRAM) 50 MG tablet Take 1 tablet by mouth every 6 hours as needed for Pain for up to 90 days.  4/17/20 7/16/20 Yes Hill James DO   irbesartan (AVAPRO) 300 MG tablet Take 1 tablet by mouth daily 3/12/20  Yes Sophia Cordero MD vitamin E 400 UNIT capsule Take 400 Units by mouth nightly   Yes Historical Provider, MD   bisoprolol-hydrochlorothiazide [facility]) 10-6.25 MG per tablet TAKE 1 TABLET DAILY 8/19/19  Yes Hill James,    hydrochlorothiazide (MICROZIDE) 12.5 MG capsule TAKE 1 CAPSULE DAILY 8/19/19  Yes Hill James, DO   aspirin 81 MG tablet Take 81 mg by mouth daily   Yes Historical Provider, MD   Multiple Vitamins-Minerals (THERAPEUTIC MULTIVITAMIN-MINERALS) tablet Take 1 tablet by mouth daily   Yes Historical Provider, MD   acetaminophen (TYLENOL) 325 MG tablet Take 650 mg by mouth every 6 hours as needed for Pain   Yes Historical Provider, MD   melatonin (RA MELATONIN) 3 MG TABS tablet Take 1 tablet by mouth nightly. 9/17/14  Yes Hill James, DO   Calcium-Vitamin D (CALTRATE 600 PLUS-VIT D PO) Take  by mouth. Yes Historical Provider, MD   Misc Natural Products (OSTEO BI-FLEX TRIPLE STRENGTH PO) Take 1 tablet by mouth 2 times daily    Yes Historical Provider, MD   Evolocumab 140 MG/ML SOAJ Inject 140 mLs into the skin every 14 days 7/15/20   Moreno Keys MD        Review of Systems   Constitutional: Negative for activity change, appetite change, diaphoresis, fatigue, fever and unexpected weight change. HENT: Negative for congestion, facial swelling, mouth sores and nosebleeds. Eyes: Negative for discharge and visual disturbance. Respiratory: Negative for cough, chest tightness, shortness of breath and wheezing. Cardiovascular: Negative for chest pain, palpitations and leg swelling. Gastrointestinal: Negative for abdominal distention, abdominal pain, blood in stool and vomiting. Endocrine: Negative for cold intolerance, heat intolerance and polyuria. Genitourinary: Negative for difficulty urinating, dysuria, frequency and hematuria. Musculoskeletal: Negative for back pain, joint swelling, myalgias and neck pain. Skin: Negative for color change, pallor and rash.    Allergic/Immunologic: Negative Skin: Skin is warm and dry. No rash noted. She is not diaphoretic. No erythema. Psychiatric: She has a normal mood and affect.  Her behavior is normal. Judgment and thought content normal.       Labs:       Lab Results   Component Value Date    WBC 5.9 02/25/2020    HGB 16.1 (H) 02/25/2020    HCT 48.7 (H) 02/25/2020    .2 (H) 02/25/2020     02/25/2020     Lab Results   Component Value Date     02/25/2020    K 4.6 02/25/2020    CL 98 (L) 02/25/2020    CO2 26 02/25/2020    BUN 28 (H) 02/25/2020    CREATININE 1.0 02/25/2020    GLUCOSE 93 02/25/2020    CALCIUM 10.7 (H) 02/25/2020    PROT 7.7 02/25/2020    LABALBU 4.7 02/25/2020    BILITOT 0.5 02/25/2020    ALKPHOS 71 02/25/2020    AST 27 02/25/2020    ALT 19 02/25/2020    LABGLOM 53 (A) 02/25/2020    GFRAA >60 02/25/2020    AGRATIO 1.6 02/25/2020    GLOB 3.0 02/25/2020         Lab Results   Component Value Date    CHOL 299 (H) 02/25/2020    CHOL 271 (H) 10/28/2019    CHOL 213 (H) 04/04/2019     Lab Results   Component Value Date    TRIG 303 (H) 02/25/2020    TRIG 390 (H) 10/28/2019    TRIG 170 (H) 04/04/2019     Lab Results   Component Value Date    HDL 47 02/25/2020    HDL 49 10/28/2019    HDL 47 04/04/2019     Lab Results   Component Value Date    LDLCALC see below 02/25/2020    [address] see below 10/28/2019    LDLCALC 132 (H) 04/04/2019     Lab Results   Component Value Date    LABVLDL see below 02/25/2020    LABVLDL see below 10/28/2019    LABVLDL 34 04/04/2019     No results found for: [facility]    Lab Results   Component Value Date    INR 0.99 04/09/2019    INR 0.90 03/11/2019    INR 0.89 05/13/2014    PROTIME 11.3 04/09/2019    PROTIME 10.3 03/11/2019    PROTIME 10.0 05/13/2014       The 10-year ASCVD risk score (Jose President., et al., 2013) is: 26.6%    Values used to calculate the score:      Age: 79 years      Sex: Female      Is Non- : No      Diabetic: No      Tobacco smoker: No      Systolic Blood Pressure: 998 elevated CK of 181 (rhabdo)    Lab Results   Component Value Date    LDLCALC see below 02/25/2020    LDLDIRECT 233 (H) 02/25/2020       I had the opportunity to review the clinical symptoms and presentation of Kat Reaves. Patient's allergies and medications were reviewed and updated. Patient's past medical, surgical, social and family history were reviewed and updated. Patient's testing including laboratory, ECGs, monitor, imaging (TTE,ALANNAH,CMR,cath) were reviewed. Tobacco use was discussed with the patient and educated on the negative effects. I have asked the patient to not utilize these agents. All questions and concerns were addressed to the patient/family. Alternatives to my treatment were discussed. The note was completed using EMR. Every effort was made to ensure accuracy; however, inadvertent computerized transcription errors may be present. Thank you for allowing me to participate in the care or [facility].  Monet Cardozo MD, [facility]

## 2020-07-01 ENCOUNTER — TELEPHONE (OUTPATIENT)
Dept: CARDIOLOGY CLINIC | Age: 80
End: 2020-07-01

## 2020-07-06 ENCOUNTER — TELEPHONE (OUTPATIENT)
Dept: CARDIOLOGY CLINIC | Age: 80
End: 2020-07-06

## 2020-07-06 NOTE — TELEPHONE ENCOUNTER
Spoke with Guera Peñaloza from [employer] to initiate a PA for [employer]. They will be sending questionnaires that needs to be filled out and faxed within 72 hours to expedite process.  Case # [id number]

## 2020-07-08 ENCOUNTER — TELEPHONE (OUTPATIENT)
Dept: CARDIOLOGY CLINIC | Age: 80
End: 2020-07-08

## 2020-07-09 ENCOUNTER — OFFICE VISIT (OUTPATIENT)
Dept: ORTHOPEDIC SURGERY | Age: 80
End: 2020-07-09
Payer: MEDICARE

## 2020-07-09 VITALS — HEIGHT: 64 IN | TEMPERATURE: 96.5 F | RESPIRATION RATE: 14 BRPM | BODY MASS INDEX: 33.42 KG/M2 | WEIGHT: 195.77 LBS

## 2020-07-09 PROCEDURE — G8417 CALC BMI ABV UP PARAM F/U: HCPCS | Performed by: PHYSICAL MEDICINE & REHABILITATION

## 2020-07-09 PROCEDURE — G8427 DOCREV CUR MEDS BY ELIG CLIN: HCPCS | Performed by: PHYSICAL MEDICINE & REHABILITATION

## 2020-07-09 PROCEDURE — 1123F ACP DISCUSS/DSCN MKR DOCD: CPT | Performed by: PHYSICAL MEDICINE & REHABILITATION

## 2020-07-09 PROCEDURE — 4040F PNEUMOC VAC/ADMIN/RCVD: CPT | Performed by: PHYSICAL MEDICINE & REHABILITATION

## 2020-07-09 PROCEDURE — 99213 OFFICE O/P EST LOW 20 MIN: CPT | Performed by: PHYSICAL MEDICINE & REHABILITATION

## 2020-07-09 PROCEDURE — 1090F PRES/ABSN URINE INCON ASSESS: CPT | Performed by: PHYSICAL MEDICINE & REHABILITATION

## 2020-07-09 PROCEDURE — 1036F TOBACCO NON-USER: CPT | Performed by: PHYSICAL MEDICINE & REHABILITATION

## 2020-07-09 PROCEDURE — G8399 PT W/DXA RESULTS DOCUMENT: HCPCS | Performed by: PHYSICAL MEDICINE & REHABILITATION

## 2020-07-09 NOTE — PROGRESS NOTES
bladder symptoms:  no   Perceived weakness:  yes   Difficulty walking:  yes            Past medical, surgical, social and family history reviewed with the patient.  No pertinent relevant history  Past Medical History:   Past Medical History:   Diagnosis Date    Acquired spondylolisthesis of lumbosacral region     Bilateral primary osteoarthritis of hip     CAD (coronary artery disease)     one stent in heart    Chronic bilateral low back pain without sciatica 2/8/2018    CKD (chronic kidney disease), stage III (HCC)     DDD (degenerative disc disease), lumbar     Dr. Stefanie Egan GERD (gastroesophageal reflux disease)     Headache(784.0)     Heel spur 08/2016    History of lumbar spinal fusion     Hyperlipidemia     Hypertension     IBS (irritable bowel syndrome)     Lyme disease 2009    suspected    Medical history reviewed with no changes     MVP (mitral valve prolapse)     Neuroma nos     Osteoarthritis     Osteopenia     Post-laminectomy syndrome     Rotator cuff tear     Dr. Cherie Blizzard    Squamous cell cancer of skin of right hand 05/2018    squamous cell carcinoma    Stress incontinence, female     Urinary incontinence       Past Surgical History:     Past Surgical History:   Procedure Laterality Date    BREAST SURGERY      cyst removal    COLONOSCOPY  2006    CORONARY ANGIOPLASTY WITH STENT PLACEMENT      x1    LAMINECTOMY  5/19/14    LUMBAR 4-5 DECOMPRESSIVE LAMINECTOMY AND POSTERIOR LATERAL FUSION LUMBAR 4-5 AND PEDICLE SCREW FIXATION     LAMINECTOMY POSTERIOR THORACIC / LUMBAR Right 4/9/2019    THORACIC LAMINECTOMY WITH PADDLE LEAD AND RECHARGEBLE BATTERY IN RIGHT HIP performed by Darby Sinclair MD at [facility]  10/02/2019    R nasal sidewall    LA ARTHROCENTESIS ASPIR&/INJ INTERM JT/BURS W/O US N/A 10/24/2018    MIDLINE CAUDEL INJECTION WITH FLUOROSCOPY performed by Rosario Sorto MD at [facility]  2002    stent, LAD   [place] SKIN CANCER EXCISION Right 05/24/2018    Right dorsal hand, Dr. Tuttle Held N/A 3/11/2019    SPINAL CORD STIMULATOR IMPLANT TRIAL WITH FLUOROSCOPY WITH NEVRO performed by Niya Shelton MD at [facility]      age 10     Current Medications:     Current Outpatient Medications:     Evolocumab 140 MG/ML SOAJ, Inject 140 mLs into the skin every 14 days, Disp: 140 mL, Rfl: 5    gabapentin (NEURONTIN) 300 MG capsule, TAKE 1 CAPSULE BY MOUTH EVERY NIGHT, Disp: 90 capsule, Rfl: 0    traMADol (ULTRAM) 50 MG tablet, Take 1 tablet by mouth every 6 hours as needed for Pain for up to 90 days. , Disp: 360 tablet, Rfl: 0    irbesartan (AVAPRO) 300 MG tablet, Take 1 tablet by mouth daily, Disp: 90 tablet, Rfl: 1    vitamin E 400 UNIT capsule, Take 400 Units by mouth nightly, Disp: , Rfl:     bisoprolol-hydrochlorothiazide (ZIAC) 10-6.25 MG per tablet, TAKE 1 TABLET DAILY, Disp: 90 tablet, Rfl: 3    hydrochlorothiazide (MICROZIDE) 12.5 MG capsule, TAKE 1 CAPSULE DAILY, Disp: 90 capsule, Rfl: 3    aspirin 81 MG tablet, Take 81 mg by mouth daily, Disp: , Rfl:     Multiple Vitamins-Minerals (THERAPEUTIC MULTIVITAMIN-MINERALS) tablet, Take 1 tablet by mouth daily, Disp: , Rfl:     acetaminophen (TYLENOL) 325 MG tablet, Take 650 mg by mouth every 6 hours as needed for Pain, Disp: , Rfl:     melatonin (RA MELATONIN) 3 MG TABS tablet, Take 1 tablet by mouth nightly., Disp: , Rfl: 3    Calcium-Vitamin D (CALTRATE 600 PLUS-VIT D PO), Take  by mouth., Disp: , Rfl:     Misc Natural Products (OSTEO BI-FLEX TRIPLE STRENGTH PO), Take 1 tablet by mouth 2 times daily , Disp: , Rfl:   Allergies:  Aquaphor [aquaphilic]; Other; Statins; and Adhesive tape  Social History:    reports that she has never smoked. She has never used smokeless tobacco. She reports that she does not drink alcohol or use drugs.   Family History:   Family History   Problem Relation Age of Onset    Diabetes Brother     Diabetes Mother     Heart Disease Mother     Heart Disease Father     Cancer Father         skin       REVIEW OF SYSTEMS:   CONSTITUTIONAL: Denies unexplained weight loss, fevers, chills or fatigue  NEUROLOGICAL: Denies unsteady gait or progressive weakness  MUSCULOSKELETAL: Denies joint swelling or redness  GI: Denies nausea, vomiting, diarrhea   : Denies bowel or bladder issues       PHYSICAL EXAM:    Vitals: Temperature 96.5 °F (35.8 °C), resp. rate 14, height 5' 4.02\" (1.626 m), weight 195 lb 12.3 oz (88.8 kg). GENERAL EXAM:  · General Apparence: Patient is adequately groomed with no evidence of malnutrition. · Psychiatric: Orientation: The patient is oriented to time, place and person. The patient's mood and affect are appropriate   · Vascular: Examination reveals no swelling and palpation reveals no tenderness in upper or lower extremities. Good capillary refill. · The lymphatic examination of the neck, axillae and groin reveals all areas to be without enlargement or induration  · Sensation is intact without deficit in the upper and lower extremities to light touch and pinprick  · Coordination of the upper and lower extremities are normal.  · Additional Examinations:  · RIGHT UPPER EXTREMITY:  Inspection/examination of the right upper extremity does not show any tenderness, deformity or injury. Range of motion is unremarkable and pain-free. There is no gross instability. There are no rashes, ulcerations or lesions. Strength and tone are normal. No atrophy or abnormal movements are noted. · LEFT UPPER EXTREMITY: Inspection/examination of the left upper extremity does not show any tenderness, deformity or injury. Range of motion is unremarkable and pain-free. There is no gross instability. There are no rashes, ulcerations or lesions. Strength and tone are normal. No atrophy or abnormal movements are noted.     LUMBAR/SACRAL EXAMINATION:  · Inspection: Local inspection shows no step-off or bruising. Lumbar alignment is normal. No instability is noted. · Palpation:   No evidence of tenderness at the midline. Lumbar paraspinal tenderness: Mild L4/5 and L5/S1 tenderness  Bursal tenderness Bilateral GT bursa tenderness  There is no paraspinal spasm. · Range of Motion: limited by 25% in all planes due to pain  · Strength:   Strength testing is 5/5 in all muscle groups tested. · Special Tests:   Straight leg raise and crossed SLR negative. Camron's testing is positive bilaterally. FADIR's testing is negative bilaterally. Bowstring test negative. Slump test negative. · Skin: There are no rashes, ulcerations or lesions. · Reflexes: Reflexes are symmetrically 2+ at the patellar and ankle tendons. Clonus absent bilaterally at the feet. · Gait & station: normal, patient ambulates without assistance and no ataxia  · Additional Examinations:  · RIGHT LOWER EXTREMITY: Inspection/examination of the right lower extremity does not show any tenderness, deformity or injury. Range of motion is normal and pain-free. There is no gross instability. There are no rashes, ulcerations or lesions. Strength and tone are normal. No atrophy or abnormal movements are noted. · LEFT LOWER EXTREMITY:  Inspection/examination of the left lower extremity does not show any tenderness, deformity or injury. Range of motion is normal and pain-free. There is no gross instability. There are no rashes, ulcerations or lesions. Strength and tone are normal. No atrophy or abnormal movements are noted.       Diagnostic Testing:    MR Lumbar spine shows    Approximately 8 mm anterolisthesis at L4-5.  Posterior fixation at L4-5 is   noted.       There is moderate spinal canal stenosis at L2-3.  There is moderate   multilevel neural foraminal narrowing.           Results for orders placed or performed during the hospital encounter of 03/19/20   NM Cardiac Stress Test Nuclear Imaging   Result Value Ref Range    Left Ventricular Ejection Fraction 71     LVEF MODALITY Nuclear      Impression:       1. Sacroiliac joint dysfunction of both sides    2. Lumbar post-laminectomy syndrome    3. Chronic pain syndrome    4. Thoracic radiculitis        Plan:  Clinical Course: Above diagnoses are worsening    I discussed the diagnosis and the treatment options with Lurdes Ferrer today. In Summary:  The various treatment options were outlined and discussed with Lurdes Ferrer including:  Conservative care options: physical therapy, ice, medications, bracing, and activity modification. The indications for therapeutic injections. The indications for additional imaging/laboratory studies. The indications for (possible future) interventions. After considering the various options discussed, Lurdes Ferrer elected to pursue a course of treatment that includes the followin. Medications:  Zanaflex 4 mg 1/2 tablet prn spasms and pain    2. PT:  Encouraged to continue with Home exercise program.    3. Further studies:COVID-19 testing prior to [facility]    4. Interventional:  We discussed pursuing a bilateral Sacroiliac joint injection as SI joint pathology is suspected as the etiology of the chronic low back/hip pain. There has been failure of non-invasive and conservative treatment including physical therapy/home exercise program, rest, NSAIDs or acetaminophen. Risks include but are not limited to bleeding, infection, nerve injury, increase in pain and lack of pain relief. The patient verbalized understanding and would like to proceed. 5. Follow up:  2-3 weeks      Lurdes Ferrer was instructed to call the office if her symptoms worsen or if new symptoms appear prior to the next scheduled visit. She is specifically instructed to contact the office between now & her scheduled appointment if she has concerns related to her condition or if she needs assistance in scheduling the above tests.  She is welcome to call for an appointment sooner if she has any additional concerns or questions. Jessie Singh ATC, am scribing for and in the presence of Dr. Sukhdev Parrish. 07/09/20 2:12 PM Isabel Robertson ATC    The physical examination was performed between the patient and Dr. Sukhdev Parrish. All counseling during the appointment was performed between the patient and provider. I, Dr. Katelynn Barrera, personally performed the services described in this documentation as scribed by REGINALDO Mane in my presence and it is both accurate and complete. Will Moreno. Kelin Fox MD, MARCELINA, [facility]  Board Certified in [address] [place] [address] Certified and Fellowship Trained in [facility] of [address]             This dictation was performed with a verbal recognition program [facility]) and it was checked for errors. It is possible that there are still dictated errors within this office note. If so, please bring any errors to my attention for an addendum. All efforts were made to ensure that this office note is accurate.

## 2020-07-10 ENCOUNTER — TELEPHONE (OUTPATIENT)
Dept: ORTHOPEDIC SURGERY | Age: 80
End: 2020-07-10

## 2020-07-10 NOTE — TELEPHONE ENCOUNTER
Auth: NPR  Date: 7/17/2020 thru 10/15/2020  Reference # [id number]  Spoke with: Online  Type of SX: Outpatient [facility]  Location: [address]  CPT [address], [address] [facility], [address], [facility]. area: [address] [place], [place] [address]way: [employer]

## 2020-07-13 ENCOUNTER — OFFICE VISIT (OUTPATIENT)
Dept: PRIMARY CARE CLINIC | Age: 80
End: 2020-07-13
Payer: MEDICARE

## 2020-07-13 PROCEDURE — 99211 OFF/OP EST MAY X REQ PHY/QHP: CPT | Performed by: NURSE PRACTITIONER

## 2020-07-13 NOTE — PROGRESS NOTES
Carmencita Luna received a viral test for COVID-19. They were educated on isolation and quarantine as appropriate. For any symptoms, they were directed to seek care from their PCP, given contact information to establish with a doctor, directed to an urgent care or the emergency room. Patient was seen today for pre op Covid testing.

## 2020-07-13 NOTE — PATIENT INSTRUCTIONS
You have received a viral test for COVID-19. Below is education on quarantine per the CDC guidelines. For any symptoms, seek care from your PCP, call [phone] to establish care with a doctor, or go directly to an urgent care or the emergency room. Test results will take 2-7 days and will be sent to you in your [employer] account. If you test positive, you will be contacted via phone. If you test negative, the ONLY communication will be through [address] GO TO [facility]gent [facility] AND SIGN UP FOR [employer]  (LOWER LEFT OF THE HOME PAGE)  No test is 100%. If you have symptoms, you should follow the guidance of quarantine as previously stated. You can still be contagious if you have symptoms. Your [facility] will reach out to you if you have a positive result. They will provide you with a return to work date and note. If you were tested for a pre-op, then you should remain in quarantine until your procedure. How do I know if I need to be in quarantine? If you live in a community where COVID-19 is or might be spreading (currently, that is virtually everywhere in the [place])  Be alert for symptoms. Watch for fever, cough, shortness of breath, or other symptoms of COVID-19.  Take your temperature if symptoms develop.  Practice social distancing. Maintain 6 feet of distance from others and stay out of crowded places.  Follow CDC guidance if symptoms develop. If you feel healthy but:   Recently had close contact with a person with COVID-19 you need to Quarantine:   Stay home until 14 days after your last exposure.  Check your temperature twice a day and watch for symptoms of COVID-19.  If possible, stay away from people who are at higher-risk for getting very sick from COVID-19.   Stay Home and Monitor Your Health if you:   Have been diagnosed with COVID-19, or   Are waiting for test results, or   Have cough, fever, or shortness of breath, or symptoms of COVID-19      When You Can

## 2020-07-15 ENCOUNTER — TELEPHONE (OUTPATIENT)
Dept: CARDIOLOGY CLINIC | Age: 80
End: 2020-07-15

## 2020-07-15 NOTE — TELEPHONE ENCOUNTER
[facility] denied by [facility]. Called patient's [facility], they do not fill Reptha. Prescription went to [facility]. Waiting response.

## 2020-07-16 LAB
SARS-COV-2: NOT DETECTED
SOURCE: NORMAL

## 2020-07-17 ENCOUNTER — APPOINTMENT (OUTPATIENT)
Dept: GENERAL RADIOLOGY | Age: 80
End: 2020-07-17
Attending: PHYSICAL MEDICINE & REHABILITATION
Payer: MEDICARE

## 2020-07-17 ENCOUNTER — HOSPITAL ENCOUNTER (OUTPATIENT)
Age: 80
Setting detail: OUTPATIENT SURGERY
Discharge: HOME | End: 2020-07-17
Attending: PHYSICAL MEDICINE & REHABILITATION | Admitting: PHYSICAL MEDICINE & REHABILITATION
Payer: MEDICARE

## 2020-07-17 VITALS
HEIGHT: 64 IN | HEART RATE: 70 BPM | BODY MASS INDEX: 32.44 KG/M2 | RESPIRATION RATE: 16 BRPM | SYSTOLIC BLOOD PRESSURE: 164 MMHG | DIASTOLIC BLOOD PRESSURE: 86 MMHG | TEMPERATURE: 97 F | WEIGHT: 190 LBS | OXYGEN SATURATION: 99 %

## 2020-07-17 PROCEDURE — 2709999900 HC NON-CHARGEABLE SUPPLY: Performed by: PHYSICAL MEDICINE & REHABILITATION

## 2020-07-17 PROCEDURE — 3610000056 HC PAIN LEVEL 4 BASE (NON-OR): Performed by: PHYSICAL MEDICINE & REHABILITATION

## 2020-07-17 PROCEDURE — G0260 INJ FOR SACROILIAC JT ANESTH: HCPCS

## 2020-07-17 PROCEDURE — 2500000003 HC RX 250 WO HCPCS: Performed by: PHYSICAL MEDICINE & REHABILITATION

## 2020-07-17 RX ORDER — LIDOCAINE HYDROCHLORIDE 10 MG/ML
INJECTION, SOLUTION EPIDURAL; INFILTRATION; INTRACAUDAL; PERINEURAL
Status: COMPLETED | OUTPATIENT
Start: 2020-07-17 | End: 2020-07-17

## 2020-07-17 RX ORDER — BUPIVACAINE HYDROCHLORIDE 5 MG/ML
INJECTION, SOLUTION EPIDURAL; INTRACAUDAL
Status: COMPLETED | OUTPATIENT
Start: 2020-07-17 | End: 2020-07-17

## 2020-07-17 ASSESSMENT — PAIN - FUNCTIONAL ASSESSMENT: PAIN_FUNCTIONAL_ASSESSMENT: 0-10

## 2020-07-17 ASSESSMENT — PAIN DESCRIPTION - DESCRIPTORS: DESCRIPTORS: ACHING

## 2020-07-17 ASSESSMENT — PAIN SCALES - GENERAL: PAINLEVEL_OUTOF10: 0

## 2020-07-17 NOTE — H&P
THORACIC LAMINECTOMY WITH PADDLE LEAD AND RECHARGEBLE BATTERY IN RIGHT HIP performed by Zack Rocha MD at [facility]  10/02/2019    R nasal sidewall    ND ARTHROCENTESIS ASPIR&/INJ INTERM JT/BURS W/O US N/A 10/24/2018    MIDLINE CAUDEL INJECTION WITH FLUOROSCOPY performed by Tej Beth MD at [facility]  2002    stent, LAD    SKIN CANCER EXCISION Right 05/24/2018    Right dorsal hand, Dr. Arnol Judd N/A 3/11/2019    [address] performed by Tej Beth MD at [facility]      age 10     Current Medications:   Prior to Admission medications    Medication Sig Start Date End Date Taking? Authorizing Provider   gabapentin (NEURONTIN) 300 MG capsule TAKE 1 CAPSULE BY MOUTH EVERY NIGHT 6/1/20 8/30/20 Yes Hill James DO   irbesartan (AVAPRO) 300 MG tablet Take 1 tablet by mouth daily 3/12/20  Yes Raylene Cheadle, MD   bisoprolol-hydrochlorothiazide [facility]) 10-6.25 MG per tablet TAKE 1 TABLET DAILY 8/19/19  Yes Hill James DO   hydrochlorothiazide (MICROZIDE) 12.5 MG capsule TAKE 1 CAPSULE DAILY 8/19/19  Yes Hill James DO   Evolocumab 140 MG/ML SOAJ Inject 140 mLs into the skin every 14 days 7/15/20   Raylene Cheadle, MD   vitamin E 400 UNIT capsule Take 400 Units by mouth nightly    Historical Provider, MD   aspirin 81 MG tablet Take 81 mg by mouth daily    Historical Provider, MD   Multiple Vitamins-Minerals (THERAPEUTIC MULTIVITAMIN-MINERALS) tablet Take 1 tablet by mouth daily    Historical Provider, MD   acetaminophen (TYLENOL) 325 MG tablet Take 650 mg by mouth every 6 hours as needed for Pain    Historical Provider, MD   melatonin (RA MELATONIN) 3 MG TABS tablet Take 1 tablet by mouth nightly. 9/17/14   Hill James DO   Calcium-Vitamin D (CALTRATE 600 PLUS-VIT D PO) Take  by mouth.     Historical Provider, MD   [facility] Natural Products (OSTEO BI-FLEX TRIPLE STRENGTH PO) Take 1 tablet by mouth 2 times daily     Historical Provider, MD     Allergies:  Aquaphor [aquaphilic]; Other; Statins; and Adhesive tape  Social History:    reports that she has never smoked. She has never used smokeless tobacco. She reports that she does not drink alcohol or use drugs. Family History:   Family History   Problem Relation Age of Onset    Diabetes Brother     Diabetes Mother     Heart Disease Mother     Heart Disease Father     Cancer Father         skin       Vitals: Blood pressure (!) 187/98, pulse 81, temperature 97 °F (36.1 °C), resp. rate 16, height 5' 4\" (1.626 m), weight 190 lb (86.2 kg), SpO2 97 %. PHYSICAL EXAM:including affected areas  HENT: Airway patent and reviewed  Cardiovascular: Normal rate, regular rhythm, normal heart sounds. Pulmonary/Chest: No wheezes. No rhonchi. No rales. Abdominal: Soft. Bowel sounds are normal. No distension. Extremities: Moves all extremities equally  Cervical and Lumbar Spine: Painful range of motion, no midline tenderness       Diagnosis:Bilateral sacroiliac joint dysfunction  M53.3  M96.1  G89.4  M70.61  M70.62    Plan: Proceed with planned procedure      ASA CLASS:         []   I. Normal, healthy adult           [x]   II.  Mild systemic disease            []   III. Severe systemic disease      Mallampati: Mallampati Class II - (soft palate, fauces & uvula are visible)      Sedation plan:   [x]  Local              []  Minimal                  []  General anesthesia    Patient's condition acceptable for planned procedure/sedation. Post Procedure Plan   Return to same level of care   ______________________     The patient was counseled at length about the risks of usha Covid-19 in the marleny-operative and post-operative states including the recovery window of their procedure.   The patient was made aware that usha Covid-19 after a surgical procedure may worsen their prognosis for recovering from the virus and lend

## 2020-07-17 NOTE — OP NOTE
Patient:  Brandt Romberg  YOB: 1940  Medical Record #:  [id number]   Place:  [address] [place] [address]  Date:  7/17/2020   Physician:  Iliana Austin MD, MARCELINA    Procedure:   Bilateral Sacro-iliac Joint Injection with Fluoroscopy    CPT 80274 Mod 50     Pre-Procedure Diagnosis: Bilateral SI joint dysfunction and pain    Post-Procedure Diagnosis: Same    Sedation: Local with 1% Lidocaine 3 ml and no IV sedation    EBL: None    Complications: None    Procedure Summary:    The patient was seen in the office for complaints of lower back and gluteal pain. Review of the imaging and physical exam of the patient confirmed the pre-procedure diagnosis. After a thorough discussion of risks, benefits and alternatives informed consent was obtained. The patient was brought to the procedure suite and placed in the prone position. The skin overlying the sacrum was prepped and draped in the usual sterile fashion. Using rotational fluoroscopic guidance, the distal 1/3 of the right sacro-iliac joint space was identified. Through anesthetized skin a 22 gauge 3.5 inch curved tip spinal needle was advanced into the articular space. Isovue M300 was instilled showing an articular pattern. 2 ml of a solution mixed with 40 mg of depomedrol and 0.5% Marcaine was instilled. The identical procedure was repeated on the left side. The needle was removed and a band-aid applied. The patient was transferred to the post-operative area in stable condition.

## 2020-07-20 NOTE — TELEPHONE ENCOUNTER
Chelsea Begum approved 1/1/20-7/. Letter in Media. Called patient and notified her. Also gave her [employer] information if her [facility] does not carry repatha.

## 2020-08-06 ENCOUNTER — OFFICE VISIT (OUTPATIENT)
Dept: ORTHOPEDIC SURGERY | Age: 80
End: 2020-08-06
Payer: MEDICARE

## 2020-08-06 VITALS — BODY MASS INDEX: 32.44 KG/M2 | RESPIRATION RATE: 12 BRPM | HEIGHT: 64 IN | TEMPERATURE: 97.3 F | WEIGHT: 190 LBS

## 2020-08-06 PROCEDURE — 99213 OFFICE O/P EST LOW 20 MIN: CPT | Performed by: PHYSICAL MEDICINE & REHABILITATION

## 2020-08-06 PROCEDURE — G8417 CALC BMI ABV UP PARAM F/U: HCPCS | Performed by: PHYSICAL MEDICINE & REHABILITATION

## 2020-08-06 PROCEDURE — 1036F TOBACCO NON-USER: CPT | Performed by: PHYSICAL MEDICINE & REHABILITATION

## 2020-08-06 PROCEDURE — G8399 PT W/DXA RESULTS DOCUMENT: HCPCS | Performed by: PHYSICAL MEDICINE & REHABILITATION

## 2020-08-06 PROCEDURE — 4040F PNEUMOC VAC/ADMIN/RCVD: CPT | Performed by: PHYSICAL MEDICINE & REHABILITATION

## 2020-08-06 PROCEDURE — G8427 DOCREV CUR MEDS BY ELIG CLIN: HCPCS | Performed by: PHYSICAL MEDICINE & REHABILITATION

## 2020-08-06 PROCEDURE — 1090F PRES/ABSN URINE INCON ASSESS: CPT | Performed by: PHYSICAL MEDICINE & REHABILITATION

## 2020-08-06 PROCEDURE — 1123F ACP DISCUSS/DSCN MKR DOCD: CPT | Performed by: PHYSICAL MEDICINE & REHABILITATION

## 2020-08-06 NOTE — PROGRESS NOTES
Follow up: [id number] [facility]  1940  [id number]         Chief Complaint   Patient presents with    Lower Back Pain     F/u Bilateral SI joint 7/17         HISTORY OF PRESENT ILLNESS:  Ms. Eladio Jasso is a 79 y.o. female returns for a follow up visit for multiple medical problems. Her current presenting problems are   1. Coccydynia    2. Sacral pain    3. Chronic pain syndrome    4. Sacroiliac joint dysfunction of both sides    5. Spondylolisthesis of lumbar region    6. Lumbar stenosis without neurogenic claudication    . As per information/history obtained from the PADT(patient assessment and documentation tool) - She complains of pain in the lower back, buttocks, hips Bilateral, upper leg Bilateral and tailbone. She rates the pain 4/10 and describes it as aching. Pain is made worse by: walking, standing. She denies side effects from the current pain regimen. Patient reports that since the last follow up visit the physical functioning is unchanged, family/social relationships are unchanged, mood is unchanged and sleep patterns are unchanged, and that the overall functioning is 60% improved following SI joint injections. Patient denies neurological bowel or bladder. Ms. Eladio Jasso presents today for follow up of her ongoing low back and bilateral thigh pain. She also notes having some tailbone pain as well. She recently underwent a bilateral SI joint injection on 7/17/20. She reports overall improvement in her symptoms. The patient states her lidocaine patch helps tremendously. Standing and walking make the pain worse. The notes being able to walk for 2 minutes at once, stand for 1 minute at once and sit for hours without any issues. She has not had any new injuries since her procedure. She does not present today using any ambulatory devices or braces. The patient would like to return to her wellness center to start exercising again. She feels that this may help decrease her pain. SURGERY  10/02/2019    R nasal sidewall    AR ARTHROCENTESIS ASPIR&/INJ INTERM JT/BURS W/O US N/A 10/24/2018    MIDLINE CAUDEL INJECTION WITH FLUOROSCOPY performed by Paulie Rosen MD at [facility]  2002    stent, LAD    SKIN CANCER EXCISION Right 05/24/2018    Right dorsal hand, Dr. Perlita Barrios N/A 3/11/2019    SPINAL CORD STIMULATOR IMPLANT TRIAL WITH FLUOROSCOPY WITH NEVRO performed by Paulie Rosen MD at [facility]      age 10     Current Medications:     Current Outpatient Medications:     Evolocumab 140 MG/ML SOAJ, Inject 140 mLs into the skin every 14 days, Disp: 140 mL, Rfl: 5    gabapentin (NEURONTIN) 300 MG capsule, TAKE 1 CAPSULE BY MOUTH EVERY NIGHT, Disp: 90 capsule, Rfl: 0    irbesartan (AVAPRO) 300 MG tablet, Take 1 tablet by mouth daily, Disp: 90 tablet, Rfl: 1    vitamin E 400 UNIT capsule, Take 400 Units by mouth nightly, Disp: , Rfl:     bisoprolol-hydrochlorothiazide (ZIAC) 10-6.25 MG per tablet, TAKE 1 TABLET DAILY, Disp: 90 tablet, Rfl: 3    hydrochlorothiazide (MICROZIDE) 12.5 MG capsule, TAKE 1 CAPSULE DAILY, Disp: 90 capsule, Rfl: 3    aspirin 81 MG tablet, Take 81 mg by mouth daily, Disp: , Rfl:     Multiple Vitamins-Minerals (THERAPEUTIC MULTIVITAMIN-MINERALS) tablet, Take 1 tablet by mouth daily, Disp: , Rfl:     acetaminophen (TYLENOL) 325 MG tablet, Take 650 mg by mouth every 6 hours as needed for Pain, Disp: , Rfl:     melatonin (RA MELATONIN) 3 MG TABS tablet, Take 1 tablet by mouth nightly., Disp: , Rfl: 3    Calcium-Vitamin D (CALTRATE 600 PLUS-VIT D PO), Take  by mouth., Disp: , Rfl:     Misc Natural Products (OSTEO BI-FLEX TRIPLE STRENGTH PO), Take 1 tablet by mouth 2 times daily , Disp: , Rfl:   Allergies:  Aquaphor [aquaphilic]; Other; Statins; and Adhesive tape  Social History:    reports that she has never smoked.  She has never used smokeless tobacco. She reports that she does not drink alcohol or EXAMINATION:  · Inspection: Local inspection shows no step-off or bruising. Lumbar alignment is normal. No instability is noted. · Palpation:   No evidence of tenderness at the midline. Lumbar paraspinal tenderness: Mild L4/5 and L5/S1 tenderness. Coccyx tenderness. Bursal tenderness No tenderness bilaterally  There is no paraspinal spasm. · Range of Motion: limited by 25% in all planes due to pain  · Strength:   Strength testing is 5/5 in all muscle groups tested. · Special Tests:   Straight leg raise and crossed SLR negative. Camron's testing is negative bilaterally. FADIR's testing is negative bilaterally. Bowstring test negative. Slump test negative. · Skin: There are no rashes, ulcerations or lesions. · Reflexes: Reflexes are symmetrically 2+ at the patellar and ankle tendons. Clonus absent bilaterally at the feet. · Gait & station: normal, patient ambulates without assistance and no ataxia  · Additional Examinations:  · RIGHT LOWER EXTREMITY: Inspection/examination of the right lower extremity does not show any tenderness, deformity or injury. Range of motion is normal and pain-free. There is no gross instability. There are no rashes, ulcerations or lesions. Strength and tone are normal. No atrophy or abnormal movements are noted. · LEFT LOWER EXTREMITY:  Inspection/examination of the left lower extremity does not show any tenderness, deformity or injury. Range of motion is normal and pain-free. There is no gross instability. There are no rashes, ulcerations or lesions. Strength and tone are normal. No atrophy or abnormal movements are noted. Diagnostic Testing:    MR Lumbar spine shows  4/20/18:     Impression    Approximately 8 mm anterolisthesis at L4-5.  Posterior fixation at L4-5 is    noted.         There is moderate spinal canal stenosis at L2-3.  There is moderate    multilevel neural foraminal narrowing.         Results for orders placed or performed in visit on 07/13/20   COVID-19 contact the office between now & her scheduled appointment if she has concerns related to her condition or if she needs assistance in scheduling the above tests. She is welcome to call for an appointment sooner if she has any additional concerns or questions. Eula CAT, am scribing for Dr. Imelda Barrera. 08/06/20 1:36 PM Eula Chris. The physical examination was performed between the patient and Dr. Imelda Barrera. All counseling during the appointment was performed between the patient and the provider. I, Dr. Imelda Barrera, personally performed the services described in this documentation as scribed by Eula Banerjee ATC in my presence and it is both accurate and complete. Natalie Cramer. Torin Higuera MD, MARCELINA, [facility]  Board Certified in [address] [place] [address] Certified and Fellowship Trained in [facility] ([facility])             This dictation was performed with a verbal recognition program [facility]) and it was checked for errors. It is possible that there are still dictated errors within this office note. If so, please bring any errors to my attention for an addendum. All efforts were made to ensure that this office note is accurate.

## 2020-08-17 ENCOUNTER — PATIENT MESSAGE (OUTPATIENT)
Dept: INTERNAL MEDICINE CLINIC | Age: 80
End: 2020-08-17

## 2020-08-17 RX ORDER — IRBESARTAN 300 MG/1
TABLET ORAL
Qty: 90 TABLET | Refills: 3 | Status: SHIPPED | OUTPATIENT
Start: 2020-08-17 | End: 2021-09-16

## 2020-08-17 RX ORDER — HYDROCHLOROTHIAZIDE 12.5 MG/1
CAPSULE, GELATIN COATED ORAL
Qty: 90 CAPSULE | Refills: 3 | Status: SHIPPED | OUTPATIENT
Start: 2020-08-17 | End: 2021-09-18

## 2020-08-17 RX ORDER — BISOPROLOL FUMARATE AND HYDROCHLOROTHIAZIDE 10; 6.25 MG/1; MG/1
TABLET ORAL
Qty: 90 TABLET | Refills: 3 | Status: SHIPPED | OUTPATIENT
Start: 2020-08-17 | End: 2021-09-18

## 2020-08-17 NOTE — TELEPHONE ENCOUNTER
Requested Prescriptions     Pending Prescriptions Disp Refills    irbesartan (AVAPRO) 300 MG tablet [Pharmacy Med Name: IRBESARTAN TABS 300MG] 90 tablet 3     Sig: TAKE 1 TABLET DAILY                  Last Office Visit: 6/25/2020     Next Office Visit: 9/30/2020

## 2020-08-19 ENCOUNTER — HOSPITAL ENCOUNTER (OUTPATIENT)
Age: 80
Discharge: HOME | End: 2020-08-19
Payer: MEDICARE

## 2020-08-19 LAB
A/G RATIO: 1.4 (ref 1.1–2.2)
ALBUMIN SERPL-MCNC: 4.2 G/DL (ref 3.4–5)
ALP BLD-CCNC: 66 U/L (ref 40–129)
ALT SERPL-CCNC: 17 U/L (ref 10–40)
ANION GAP SERPL CALCULATED.3IONS-SCNC: 11 MMOL/L (ref 3–16)
AST SERPL-CCNC: 26 U/L (ref 15–37)
BASOPHILS ABSOLUTE: 0.1 K/UL (ref 0–0.2)
BASOPHILS RELATIVE PERCENT: 1.2 %
BILIRUB SERPL-MCNC: 0.5 MG/DL (ref 0–1)
BUN BLDV-MCNC: 22 MG/DL (ref 7–20)
CALCIUM SERPL-MCNC: 9.9 MG/DL (ref 8.3–10.6)
CHLORIDE BLD-SCNC: 103 MMOL/L (ref 99–110)
CHOLESTEROL, TOTAL: 201 MG/DL (ref 0–199)
CO2: 31 MMOL/L (ref 21–32)
CREAT SERPL-MCNC: 1.1 MG/DL (ref 0.6–1.2)
EOSINOPHILS ABSOLUTE: 0.1 K/UL (ref 0–0.6)
EOSINOPHILS RELATIVE PERCENT: 2.6 %
GFR AFRICAN AMERICAN: 58
GFR NON-AFRICAN AMERICAN: 48
GLOBULIN: 3.1 G/DL
GLUCOSE BLD-MCNC: 93 MG/DL (ref 70–99)
HCT VFR BLD CALC: 47.6 % (ref 36–48)
HDLC SERPL-MCNC: 52 MG/DL (ref 40–60)
HEMOGLOBIN: 15.7 G/DL (ref 12–16)
LDL CHOLESTEROL CALCULATED: 94 MG/DL
LYMPHOCYTES ABSOLUTE: 1.5 K/UL (ref 1–5.1)
LYMPHOCYTES RELATIVE PERCENT: 28.3 %
MCH RBC QN AUTO: 33.6 PG (ref 26–34)
MCHC RBC AUTO-ENTMCNC: 33 G/DL (ref 31–36)
MCV RBC AUTO: 101.6 FL (ref 80–100)
MONOCYTES ABSOLUTE: 0.5 K/UL (ref 0–1.3)
MONOCYTES RELATIVE PERCENT: 9.6 %
NEUTROPHILS ABSOLUTE: 3.1 K/UL (ref 1.7–7.7)
NEUTROPHILS RELATIVE PERCENT: 58.3 %
PDW BLD-RTO: 13.9 % (ref 12.4–15.4)
PLATELET # BLD: 163 K/UL (ref 135–450)
PMV BLD AUTO: 10.2 FL (ref 5–10.5)
POTASSIUM SERPL-SCNC: 4.7 MMOL/L (ref 3.5–5.1)
RBC # BLD: 4.69 M/UL (ref 4–5.2)
SODIUM BLD-SCNC: 145 MMOL/L (ref 136–145)
TOTAL PROTEIN: 7.3 G/DL (ref 6.4–8.2)
TRIGL SERPL-MCNC: 274 MG/DL (ref 0–150)
TSH SERPL DL<=0.05 MIU/L-ACNC: 1.89 UIU/ML (ref 0.27–4.2)
VLDLC SERPL CALC-MCNC: 55 MG/DL
WBC # BLD: 5.3 K/UL (ref 4–11)

## 2020-08-19 PROCEDURE — 84443 ASSAY THYROID STIM HORMONE: CPT

## 2020-08-19 PROCEDURE — 36415 COLL VENOUS BLD VENIPUNCTURE: CPT

## 2020-08-19 PROCEDURE — 85025 COMPLETE CBC W/AUTO DIFF WBC: CPT

## 2020-08-19 PROCEDURE — 80061 LIPID PANEL: CPT

## 2020-08-19 PROCEDURE — 80053 COMPREHEN METABOLIC PANEL: CPT

## 2020-08-20 RX ORDER — TRAMADOL HYDROCHLORIDE 50 MG/1
50 TABLET ORAL EVERY 6 HOURS PRN
Qty: 360 TABLET | Refills: 0 | Status: CANCELLED | OUTPATIENT
Start: 2020-08-20 | End: 2020-11-18

## 2020-08-20 NOTE — TELEPHONE ENCOUNTER
Last appointment: 3/27/2020  Next appointment: Visit date not found  Last refill: 4/17/20      oarrs 12/20/19    My chart message sent to pt to call and schedule an appt

## 2020-08-21 RX ORDER — TRAMADOL HYDROCHLORIDE 50 MG/1
50 TABLET ORAL EVERY 6 HOURS PRN
Qty: 360 TABLET | Refills: 0 | Status: SHIPPED | OUTPATIENT
Start: 2020-08-21 | End: 2020-11-30

## 2020-08-21 NOTE — TELEPHONE ENCOUNTER
Last appointment: 3/27/2020  Next appointment: Visit date not found (overdue)   Last refill:  4/17/20  Oarrs 12/2019   my chart message sent to pt

## 2020-08-31 RX ORDER — GABAPENTIN 300 MG/1
CAPSULE ORAL
Qty: 90 CAPSULE | Refills: 0 | Status: SHIPPED | OUTPATIENT
Start: 2020-08-31 | End: 2020-12-01

## 2020-09-01 DIAGNOSIS — E78.2 HYPERLIPIDEMIA, MIXED: ICD-10-CM

## 2020-09-01 DIAGNOSIS — I25.10 CAD IN NATIVE ARTERY: Primary | ICD-10-CM

## 2020-09-15 ENCOUNTER — HOSPITAL ENCOUNTER (OUTPATIENT)
Age: 80
Discharge: HOME | End: 2020-09-15
Payer: MEDICARE

## 2020-09-15 LAB
CHOLESTEROL, TOTAL: 204 MG/DL (ref 0–199)
HDLC SERPL-MCNC: 48 MG/DL (ref 40–60)
LDL CHOLESTEROL CALCULATED: ABNORMAL MG/DL
LDL CHOLESTEROL DIRECT: 114 MG/DL
TRIGL SERPL-MCNC: 304 MG/DL (ref 0–150)
VLDLC SERPL CALC-MCNC: ABNORMAL MG/DL

## 2020-09-15 PROCEDURE — 80061 LIPID PANEL: CPT

## 2020-09-15 PROCEDURE — 36415 COLL VENOUS BLD VENIPUNCTURE: CPT

## 2020-09-19 NOTE — PROGRESS NOTES
 Multiple Vitamins-Minerals (THERAPEUTIC MULTIVITAMIN-MINERALS) tablet Take 1 tablet by mouth daily      acetaminophen (TYLENOL) 325 MG tablet Take 650 mg by mouth every 6 hours as needed for Pain      melatonin (RA MELATONIN) 3 MG TABS tablet Take 1 tablet by mouth nightly. 3    Calcium-Vitamin D (CALTRATE 600 PLUS-VIT D PO) Take  by mouth.  Misc Natural Products (OSTEO BI-FLEX TRIPLE STRENGTH PO) Take 1 tablet by mouth 2 times daily          Physical Examination     The following were examined and determined to be normal: Psych/Neuro, Scalp/hair, Conjunctivae/eyelids, Gums/teeth/lips, Neck, Breast/axilla/chest, Abdomen, Back, RLE, LLE, Nails/digits and Genitalia/groin/buttocks. The following were examined and determined to be abnormal: Head/face, LUE and RUE. -General: Well-appearing, NAD  1. Dorsum R hand, R nasal sidewall - scars clear   2. Lower forehead 2, L temple 1, nasal bridge 1, L ala 1, R 1 and L 1 upper cheeks - ill-defined irregularly-shaped roughly-scaling thin pink macule(s)/papule(s)   3. Distal L upper arm - 1.8cm focally light brown predominantly light pink patch       Assessment and Plan      1. History of NMSC - clear today  -Reviewed sun protective behavior -- sun avoidance during the peak hours of the day, sun-protective clothing (including hat and sunglasses), sunscreen use (water resistant, broad spectrum, SPF at least 30, need for reapplication every 2 to 3 hours), avoidance of tanning beds  -Full skin exam in 1 year (sooner if indicated)        2. Actinic keratosis(es)  -Edu re: relationship with chronic cumulative sun exposure, low premalignant potential.   -7 lesion(s) treated w/ liquid nitrogen x 2 cycles - Lower forehead 2, L temple 1, nasal bridge 1, L ala 1, R 1 and L 1 upper cheeks. Edu re: risk of blister formation, discomfort, scar, hypopigmentation. Discussed wound care.        3. Neoplasm of uncertain behavior of skin - R/o CMN, distal L upper arm   -Discussed possible diagnosis. Patient agreeable to biopsy. Verbal consent obtained after risks (infection, bleeding, scar), benefits and alternatives explained. -Area(s) to be biopsied were marked with a surgical pen. Site(s) were cleansed with alcohol. Local anesthesia achieved with 1% lidocaine with epinephrine/sodium bicarbonate. Shave biopsy performed with a razor blade. Hemostasis was achieved with aluminum chloride. The wound(s) were dressed with petrolatum and covered with a bandage. Specimen(s) sent to pathology. Pt educated re: risk of bleeding, infection, scar and wound care instructions.

## 2020-09-19 NOTE — PATIENT INSTRUCTIONS
Protecting Yourself From the Sun    · Apply broad spectrum water resistant sunscreen with an SPF of at least 30 to exposed areas of the skin. Dont forget the ears and lips! Remember to reapply sunscreen about every 2 hours and after swimming or sweating. · Wear sun protective clothing. Swim shirts (aka. rash guards) are a great idea and negates the need to reapply sunscreen in those areas. · Seek the shade whenever possible especially between the hours of 10 am and 4 pm when the suns rays are the strongest.     · Avoid tanning beds       Cryosurgery (Freezing) Wound Care Instructions    AFTER THE PROCEDURE:    You will notice swelling and redness around the site. This is normal.    You may experience a sharp or sore feeling for the next several days. For this discomfort, you may take acetaminophen (Tylenol©).  A blister may develop at the treated area, sometimes as soon as by the end of the day. After several days, the blister will subside and a scab will form.  If the area is bumped or traumatized during the first few days following freezing, you may develop bleeding into the blister, forming a blood blister. This is nothing to be alarmed about.  If the blister is tense, uncomfortable, or much larger than the site that was frozen, you may pop the blister along its edge with a sterile needle (boiled, heated under a flame, or cleaned with alcohol) to allow the fluid to drain out. If the blister does not bother you, no treatment is needed.  Do NOT peel off the top of the blister roof. It will act as a dressing on top of your wound. WOUND CARE:    You may shower or bathe as usual, but avoid scrubbing the areas that have been frozen.  Cleanse the site twice a day with mild soapy water, and then apply a thin film of white petrolatum (Vaseline©).  You do not need to cover the area, but can if you prefer.     Do NOT allow the site to become dry or crusted, or attempt to dry it out with rubbing alcohol or hydrogen peroxide.  Continue this regimen until the area is pink and healed. Depending on the size and location of your cryosurgery site, healing may take 2 to 4 weeks.  The area may continue to be pink for several weeks, and over the next few months may become darker or lighter than the surrounding skin. This may be a permanent change. Biopsy Wound Care Instructions    · Keep the bandage in place for 24 hours. · Cleanse the wound with mild soapy water daily   Gently dry the area.  Apply Vaseline or petroleum jelly to the wound using a cotton tipped applicator.  Cover with a clean bandage.  Repeat this process until the biopsy site is healed.  If you had stitches placed, continue treating the site until the stitches are removed. Remember to make an appointment to return to have your stitches removed by our staff.  You may shower and bathe as usual.       ** Biopsy results generally take around 7 business days to come back. If you have not heard from us by then, please call the office at [phone] between 8AM and 4PM Monday through Friday.

## 2020-09-22 ENCOUNTER — TELEPHONE (OUTPATIENT)
Dept: CARDIOLOGY CLINIC | Age: 80
End: 2020-09-22

## 2020-09-22 ENCOUNTER — OFFICE VISIT (OUTPATIENT)
Dept: DERMATOLOGY | Age: 80
End: 2020-09-22
Payer: MEDICARE

## 2020-09-22 VITALS — TEMPERATURE: 97.7 F

## 2020-09-22 PROCEDURE — 4040F PNEUMOC VAC/ADMIN/RCVD: CPT | Performed by: DERMATOLOGY

## 2020-09-22 PROCEDURE — 1123F ACP DISCUSS/DSCN MKR DOCD: CPT | Performed by: DERMATOLOGY

## 2020-09-22 PROCEDURE — G8427 DOCREV CUR MEDS BY ELIG CLIN: HCPCS | Performed by: DERMATOLOGY

## 2020-09-22 PROCEDURE — 11102 TANGNTL BX SKIN SINGLE LES: CPT | Performed by: DERMATOLOGY

## 2020-09-22 PROCEDURE — 17000 DESTRUCT PREMALG LESION: CPT | Performed by: DERMATOLOGY

## 2020-09-22 PROCEDURE — G8399 PT W/DXA RESULTS DOCUMENT: HCPCS | Performed by: DERMATOLOGY

## 2020-09-22 PROCEDURE — 1036F TOBACCO NON-USER: CPT | Performed by: DERMATOLOGY

## 2020-09-22 PROCEDURE — 17003 DESTRUCT PREMALG LES 2-14: CPT | Performed by: DERMATOLOGY

## 2020-09-22 PROCEDURE — 1090F PRES/ABSN URINE INCON ASSESS: CPT | Performed by: DERMATOLOGY

## 2020-09-22 PROCEDURE — G8417 CALC BMI ABV UP PARAM F/U: HCPCS | Performed by: DERMATOLOGY

## 2020-09-22 PROCEDURE — 99213 OFFICE O/P EST LOW 20 MIN: CPT | Performed by: DERMATOLOGY

## 2020-09-22 NOTE — TELEPHONE ENCOUNTER
Pharmacy needs clarification with new script for repatha. Encounter note from 8-31-20 reads she should take once a month same dose.   Please call marii at [phone]

## 2020-09-28 LAB — DERMATOLOGY PATHOLOGY REPORT: NORMAL

## 2020-09-29 ASSESSMENT — ENCOUNTER SYMPTOMS
COLOR CHANGE: 0
BACK PAIN: 0
SHORTNESS OF BREATH: 0
COUGH: 0
ABDOMINAL PAIN: 0
EYE DISCHARGE: 0
CHEST TIGHTNESS: 0
FACIAL SWELLING: 0
ABDOMINAL DISTENTION: 0
WHEEZING: 0
VOMITING: 0
BLOOD IN STOOL: 0

## 2020-09-29 NOTE — PROGRESS NOTES
[facility]     Outpatient Cardiology         Chief Complaint   Patient presents with    Coronary Artery Disease     routine 3 month cardiac evaluation       HPI      Alphonse Gomez a 79 y.o. female here for follow up for CAD/HTN/HLD after starting Repatha and repeat lab work. CAD, no chest pain or shortness of breath. Feeling okay. No symptoms concerning for angina, had a stress test earlier in the year within normal limits. Hypertension, well-controlled current medications. No changes. Hyperlipidemia, only tolerating once a month dose of Repatha (should be twice a month.) Having some back pain and hip pain after injections for about 3 days. States that now her symptoms have improved a little bit.   Most recent , down from the 200    PMH  Past Medical History:   Diagnosis Date    Acquired spondylolisthesis of lumbosacral region     Bilateral primary osteoarthritis of hip     CAD (coronary artery disease)     one stent in heart    Chronic bilateral low back pain without sciatica 2/8/2018    CKD (chronic kidney disease), stage III (HCC)     DDD (degenerative disc disease), lumbar     Dr. Lenore Sheffield GERD (gastroesophageal reflux disease)     Headache(784.0)     Heel spur 08/2016    History of lumbar spinal fusion     Hyperlipidemia     Hypertension     IBS (irritable bowel syndrome)     Lyme disease 2009    suspected    Medical history reviewed with no changes     MVP (mitral valve prolapse)     Neuroma nos     Osteoarthritis     Osteopenia     Post-laminectomy syndrome     Rotator cuff tear     Dr. Mery Moore    Squamous cell cancer of skin of right hand 05/2018    squamous cell carcinoma    Stress incontinence, female     Urinary incontinence        PSH  Past Surgical History:   Procedure Laterality Date    BREAST SURGERY      cyst removal    COLONOSCOPY  2006    CORONARY ANGIOPLASTY WITH STENT PLACEMENT      x1  HC INJECTION PROCEDURE FOR SACROILIAC JOINT Bilateral 7/17/2020    BILATERAL SACROILIAC JOINT INJECTION WITH FLUOROSCOPY performed by Hector Walden MD at [facility]  5/19/14    LUMBAR 4-5 DECOMPRESSIVE LAMINECTOMY AND POSTERIOR LATERAL FUSION LUMBAR 4-5 AND PEDICLE SCREW FIXATION     LAMINECTOMY POSTERIOR THORACIC / LUMBAR Right 4/9/2019    THORACIC LAMINECTOMY WITH PADDLE LEAD AND RECHARGEBLE BATTERY IN RIGHT HIP performed by Carmella Brito MD at [facility]  10/02/2019    R nasal sidewall    FL ARTHROCENTESIS ASPIR&/INJ INTERM JT/BURS W/O US N/A 10/24/2018    MIDLINE CAUDEL INJECTION WITH FLUOROSCOPY performed by Hector Walden MD at [facility]  2002    stent, LAD    SKIN CANCER EXCISION Right 05/24/2018    Right dorsal hand, Dr. Ureña [facility] N/A 3/11/2019    SPINAL CORD STIMULATOR IMPLANT TRIAL WITH FLUOROSCOPY WITH NEVRO performed by Hector Walden MD at [facility]      age 10        Social HIstory  Social History     Tobacco Use    Smoking status: Never Smoker    Smokeless tobacco: Never Used   Substance Use Topics    Alcohol use: No    Drug use: Never       Family History  Family History   Problem Relation Age of Onset    Diabetes Brother     Diabetes Mother     Heart Disease Mother     Heart Disease Father     Cancer Father         skin       Allergies   Allergies   Allergen Reactions    Aquaphor [Aquaphilic]     Other     Statins Other (See Comments)     Severe muscle pain    Adhesive Tape Itching and Rash     Pt Okay with paper tape       Medications:     Home Medications:  Were reviewed and are listed in nursing record. and/or listed below    Prior to Admission medications    Medication Sig Start Date End Date Taking?  Authorizing Provider   Evolocumab 140 MG/ML SOAJ Inject 140 mLs into the skin every 30 days 9/22/20  Yes Darnell Rain MD   gabapentin (NEURONTIN) 300 MG capsule TAKE 1 CAPSULE BY MOUTH EVERY NIGHT 8/31/20 11/29/20 Yes Hill James DO   traMADol (ULTRAM) 50 MG tablet Take 1 tablet by mouth every 6 hours as needed for Pain for up to 90 days. 8/21/20 11/19/20 Yes Hill James DO   irbesartan (AVAPRO) 300 MG tablet TAKE 1 TABLET DAILY 8/17/20  Yes Coleen Marroquin MD   hydroCHLOROthiazide (MICROZIDE) 12.5 MG capsule TAKE 1 CAPSULE DAILY 8/17/20  Yes Hill James DO   bisoprolol-hydrochlorothiazide (ZIAC) 10-6.25 MG per tablet TAKE 1 TABLET DAILY 8/17/20  Yes Hill James DO   vitamin E 400 UNIT capsule Take 400 Units by mouth nightly   Yes Historical Provider, MD   aspirin 81 MG tablet Take 81 mg by mouth daily   Yes Historical Provider, MD   Multiple Vitamins-Minerals (THERAPEUTIC MULTIVITAMIN-MINERALS) tablet Take 1 tablet by mouth daily   Yes Historical Provider, MD   acetaminophen (TYLENOL) 325 MG tablet Take 650 mg by mouth every 6 hours as needed for Pain   Yes Historical Provider, MD   melatonin (RA MELATONIN) 3 MG TABS tablet Take 1 tablet by mouth nightly. 9/17/14  Yes Hill James DO   Calcium-Vitamin D (CALTRATE 600 PLUS-VIT D PO) Take  by mouth. Yes Historical Provider, MD   Misc Natural Products (OSTEO BI-FLEX TRIPLE STRENGTH PO) Take 1 tablet by mouth 2 times daily    Yes Historical Provider, MD        Review of Systems   Constitutional: Negative for activity change, appetite change, diaphoresis, fatigue, fever and unexpected weight change. HENT: Negative for congestion, facial swelling, mouth sores and nosebleeds. Eyes: Negative for discharge and visual disturbance. Respiratory: Negative for cough, chest tightness, shortness of breath and wheezing. Cardiovascular: Negative for chest pain, palpitations and leg swelling. Gastrointestinal: Negative for abdominal distention, abdominal pain, blood in stool and vomiting. Endocrine: Negative for cold intolerance, heat intolerance and polyuria.    Genitourinary: Negative for difficulty urinating, dysuria, frequency and hematuria. Musculoskeletal: Negative for back pain, joint swelling, myalgias and neck pain. Skin: Negative for color change, pallor and rash. Allergic/Immunologic: Negative for immunocompromised state. Neurological: Negative for dizziness, syncope, weakness, light-headedness, numbness and headaches. Hematological: Negative for adenopathy. Does not bruise/bleed easily. Psychiatric/Behavioral: Negative for behavioral problems, confusion, decreased concentration and suicidal ideas. The patient is not nervous/anxious. Vitals:    09/30/20 1051   BP: 130/80   Pulse: 75   Temp: 97.5 °F (36.4 °C)   SpO2: 92%    Weight: 198 lb 12.8 oz (90.2 kg)       Vitals:    09/30/20 1051   BP: 130/80   Pulse: 75   Temp: 97.5 °F (36.4 °C)   SpO2: 92%   Weight: 198 lb 12.8 oz (90.2 kg)       BP Readings from Last 3 Encounters:   09/30/20 130/80   07/17/20 (!) 164/86   06/25/20 120/80       Wt Readings from Last 3 Encounters:   09/30/20 198 lb 12.8 oz (90.2 kg)   08/06/20 190 lb (86.2 kg)   07/14/20 190 lb (86.2 kg)       Physical Exam   Constitutional: She is oriented to person, place, and time. She appears well-developed and well-nourished. No distress. HENT:   Head: Normocephalic and atraumatic. Eyes: Pupils are equal, round, and reactive to light. EOM are normal.   Neck: Normal range of motion. No JVD present. No thyromegaly present. Cardiovascular: Normal rate, regular rhythm, S1 normal, S2 normal, normal heart sounds and intact distal pulses. PMI is not displaced. Exam reveals no gallop and no friction rub. No murmur heard. Pulmonary/Chest: Effort normal and breath sounds normal. No stridor. No respiratory distress. She has no wheezes. She has no rales. She exhibits no tenderness. Abdominal: Soft. Bowel sounds are normal. She exhibits no distension. There is no abdominal tenderness. There is no rebound and no guarding. Musculoskeletal: Normal range of motion.          General: No tenderness or edema. Lymphadenopathy:     She has no cervical adenopathy. Neurological: She is alert and oriented to person, place, and time. Coordination normal.   Skin: Skin is warm and dry. No rash noted. She is not diaphoretic. No erythema. Psychiatric: She has a normal mood and affect.  Her behavior is normal. Judgment and thought content normal.       Labs:       Lab Results   Component Value Date    WBC 5.3 08/19/2020    HGB 15.7 08/19/2020    HCT 47.6 08/19/2020    .6 (H) 08/19/2020     08/19/2020     Lab Results   Component Value Date     08/19/2020    K 4.7 08/19/2020     08/19/2020    CO2 31 08/19/2020    BUN 22 (H) 08/19/2020    CREATININE 1.1 08/19/2020    GLUCOSE 93 08/19/2020    CALCIUM 9.9 08/19/2020    PROT 7.3 08/19/2020    LABALBU 4.2 08/19/2020    BILITOT 0.5 08/19/2020    ALKPHOS 66 08/19/2020    AST 26 08/19/2020    ALT 17 08/19/2020    LABGLOM 48 (A) 08/19/2020    GFRAA 58 (A) 08/19/2020    AGRATIO 1.4 08/19/2020    GLOB 3.1 08/19/2020         Lab Results   Component Value Date    CHOL 204 (H) 09/15/2020    CHOL 201 (H) 08/19/2020    CHOL 299 (H) 02/25/2020     Lab Results   Component Value Date    TRIG 304 (H) 09/15/2020    TRIG 274 (H) 08/19/2020    TRIG 303 (H) 02/25/2020     Lab Results   Component Value Date    HDL 48 09/15/2020    HDL 52 08/19/2020    HDL 47 02/25/2020     Lab Results   Component Value Date    LDLCALC see below 09/15/2020    [address] 08/19/2020    LDLCALC see below 02/25/2020     Lab Results   Component Value Date    LABVLDL see below 09/15/2020    LABVLDL 55 08/19/2020    LABVLDL see below 02/25/2020     No results found for: [facility]    Lab Results   Component Value Date    INR 0.99 04/09/2019    INR 0.90 03/11/2019    INR 0.89 05/13/2014    PROTIME 11.3 04/09/2019    PROTIME 10.3 03/11/2019    PROTIME 10.0 05/13/2014       The 10-year ASCVD risk score (Ples Mcardle., et al., 2013) is: 31.2%    Values used to calculate the score: Age: 79 years      Sex: Female      Is Non- : No      Diabetic: No      Tobacco smoker: No      Systolic Blood Pressure: 088 mmHg      Is BP treated: Yes      HDL Cholesterol: 48 mg/dL      Total Cholesterol: 204 mg/dL      Imaging:       Last ECG (if available):  NSR, old ant MI, IVCD    Last Stress (if available): 3/19/20   Summary    There is normal isotope uptake at stress and rest. There is no evidence of    myocardial ischemia or scar.    Left ventricular ejection fraction of 71 %.    Overall findings represent a low risk scan. 12/8/14   Summary    Left ventricular ejection fraction of 74 %.    The LV wall motion is normal.    There is normal isotope uptake at stress and rest. There is no evidence of    myocardial ischemia or scar. Last Cath (if available):    Last TTE/ALANNAH(if available): 4/4/19   Summary   Left ventricular cavity size is normal. There is moderate concentric left   ventricular hypertrophy. Overall left ventricular systolic function appears   low normal with an ejection fraction of 50-55%. Abnormal septal motion is   present. Normal diastolic function. Mild tricuspid regurgitation. Estimated   pulmonary artery systolic pressure is at 32 mmHg assuming a right atrial   pressure of 3 mmHg. No prior echo for comparison. Last CMR  (if available):      Assessment / Plan:     CAD in native artery  Stable. Continue risk factor modification    Benign essential HTN  Well-controlled on current medications. No changes. Hyperlipidemia, mixed  LDL improved to 114 from the 200s. Tolerating only once a month dosing of PCSK9. Will reassess in 3 months, at that time may consider doing injections every 3 weeks. Unable to tolerate statins. SOB (shortness of breath)  Resolved    I had the opportunity to review the clinical symptoms and presentation of Josefina Fernandez. Patient's allergies and medications were reviewed and updated.  Patient's past medical, surgical, social and family history were reviewed and updated. Patient's testing including laboratory, ECGs, monitor, imaging (TTE,ALANNAH,CMR,cath) were reviewed. All questions and concerns were addressed to the patient/family. Alternatives to my treatment were discussed. The note was completed using EMR. Every effort was made to ensure accuracy; however, inadvertent computerized transcription errors may be present. Thank you for allowing me to participate in the care or [address]  Marcy Sales MD, [facility] [place]

## 2020-09-30 ENCOUNTER — OFFICE VISIT (OUTPATIENT)
Dept: CARDIOLOGY CLINIC | Age: 80
End: 2020-09-30
Payer: MEDICARE

## 2020-09-30 VITALS
SYSTOLIC BLOOD PRESSURE: 130 MMHG | HEART RATE: 75 BPM | DIASTOLIC BLOOD PRESSURE: 80 MMHG | WEIGHT: 198.8 LBS | TEMPERATURE: 97.5 F | OXYGEN SATURATION: 92 % | BODY MASS INDEX: 34.12 KG/M2

## 2020-09-30 PROCEDURE — 4040F PNEUMOC VAC/ADMIN/RCVD: CPT | Performed by: INTERNAL MEDICINE

## 2020-09-30 PROCEDURE — G8399 PT W/DXA RESULTS DOCUMENT: HCPCS | Performed by: INTERNAL MEDICINE

## 2020-09-30 PROCEDURE — 1090F PRES/ABSN URINE INCON ASSESS: CPT | Performed by: INTERNAL MEDICINE

## 2020-09-30 PROCEDURE — 1123F ACP DISCUSS/DSCN MKR DOCD: CPT | Performed by: INTERNAL MEDICINE

## 2020-09-30 PROCEDURE — 1036F TOBACCO NON-USER: CPT | Performed by: INTERNAL MEDICINE

## 2020-09-30 PROCEDURE — 99214 OFFICE O/P EST MOD 30 MIN: CPT | Performed by: INTERNAL MEDICINE

## 2020-09-30 PROCEDURE — G8417 CALC BMI ABV UP PARAM F/U: HCPCS | Performed by: INTERNAL MEDICINE

## 2020-09-30 PROCEDURE — G8427 DOCREV CUR MEDS BY ELIG CLIN: HCPCS | Performed by: INTERNAL MEDICINE

## 2020-09-30 NOTE — ASSESSMENT & PLAN NOTE
LDL improved to 114 from the 200s. Tolerating only once a month dosing of PCSK9. Will reassess in 3 months, at that time may consider doing injections every 3 weeks. Unable to tolerate statins.

## 2020-10-05 ENCOUNTER — HOSPITAL ENCOUNTER (EMERGENCY)
Age: 80
Discharge: HOME | End: 2020-10-05
Attending: EMERGENCY MEDICINE
Payer: MEDICARE

## 2020-10-05 ENCOUNTER — APPOINTMENT (OUTPATIENT)
Dept: CT IMAGING | Age: 80
End: 2020-10-05
Payer: MEDICARE

## 2020-10-05 ENCOUNTER — TELEPHONE (OUTPATIENT)
Dept: INTERNAL MEDICINE CLINIC | Age: 80
End: 2020-10-05

## 2020-10-05 ENCOUNTER — APPOINTMENT (OUTPATIENT)
Dept: GENERAL RADIOLOGY | Age: 80
End: 2020-10-05
Payer: MEDICARE

## 2020-10-05 VITALS
TEMPERATURE: 98.3 F | WEIGHT: 198 LBS | DIASTOLIC BLOOD PRESSURE: 77 MMHG | HEIGHT: 64 IN | OXYGEN SATURATION: 96 % | BODY MASS INDEX: 33.8 KG/M2 | SYSTOLIC BLOOD PRESSURE: 149 MMHG | HEART RATE: 74 BPM | RESPIRATION RATE: 18 BRPM

## 2020-10-05 PROCEDURE — 99284 EMERGENCY DEPT VISIT MOD MDM: CPT

## 2020-10-05 PROCEDURE — 73700 CT LOWER EXTREMITY W/O DYE: CPT

## 2020-10-05 PROCEDURE — 73562 X-RAY EXAM OF KNEE 3: CPT

## 2020-10-05 RX ORDER — MELOXICAM 7.5 MG/1
7.5 TABLET ORAL DAILY PRN
Qty: 30 TABLET | Refills: 0 | Status: SHIPPED | OUTPATIENT
Start: 2020-10-05 | End: 2020-10-26 | Stop reason: SDUPTHER

## 2020-10-05 ASSESSMENT — PAIN SCALES - GENERAL: PAINLEVEL_OUTOF10: 9

## 2020-10-05 NOTE — CARE COORDINATION
SW attempted to see pt to assist with any home or community needs. Pt discharged from ED before assessment could be completed.     Electronically signed by ELVIRA Brown, AUGUSTIN on 10/5/2020 at 2:59 PM

## 2020-10-05 NOTE — TELEPHONE ENCOUNTER
Pt got up last night and felt a \"pop\" in her knee. Pain level 9-10.    taking pt to [id number] [facility]

## 2020-10-05 NOTE — TELEPHONE ENCOUNTER
----- Message from Kanu Recinos sent at 10/5/2020  9:40 AM EDT -----  Subject: Message to Provider    QUESTIONS  Information for Provider? Pt is wanting a order for X ray for her knee. She states she stood up last night and it felt as if something collapsed   in the knee and she is in extreme pain. She is also wanting to see if   something for pain can be called into pharmacy. please advise  ---------------------------------------------------------------------------  --------------  CALL BACK INFO  What is the best way for the office to contact you? OK to leave message on   voicemail  Preferred Call Back Phone Number? [phone]  ---------------------------------------------------------------------------  --------------  SCRIPT ANSWERS  Relationship to Patient?  Self

## 2020-10-05 NOTE — ED PROVIDER NOTES
eMERGENCY dEPARTMENT eNCOUnter      PtName: Sujit Murcia  MRN: [id number]  [place] 1940  Date of evaluation: 10/5/2020  Provider: Justus Diaz, [address] [place] [address]       Chief Complaint   Patient presents with    Knee Pain     pt states after sitting for two hours last night watching a movie, she got up and felt pain \"giving out feeling\" in left knee         HISTORY OF PRESENT ILLNESS   (Location/Symptom, Timing/Onset,Context/Setting, Quality, Duration, Modifying Factors, Severity) Note limiting factors. HPI    Sujit Murcia is a 79 y.o. female who presents to the emergency department with chief complaint of left knee pain that started yesterday night. She was sitting for 2 hours and got up and felt a pop/collapse feeling in the medial aspect of the left knee. Since then she has intense pain when she ambulates 9/10 worse with ambulation better with rest and only 4/10 while sitting still. Some associated swelling. No fever. No numbness or weakness distally. Nursing Notes were reviewed. REVIEW OF SYSTEMS    (2+ forlevel 4; 10+ for level 5)     Review of Systems  See hpi for further details. Complete 10 point review of all systems performed and is otherwise negative unless noted above.     PAST MEDICAL HISTORY     Past Medical History:   Diagnosis Date    Acquired spondylolisthesis of lumbosacral region     Bilateral primary osteoarthritis of hip     CAD (coronary artery disease)     one stent in heart    Chronic bilateral low back pain without sciatica 2/8/2018    CKD (chronic kidney disease), stage III     DDD (degenerative disc disease), lumbar     Dr. Bre Beck GERD (gastroesophageal reflux disease)     Headache(784.0)     Heel spur 08/2016    History of lumbar spinal fusion     Hyperlipidemia     Hypertension     IBS (irritable bowel syndrome)     Lyme disease 2009    suspected    Medical history reviewed with no changes     MVP (mitral valve prolapse)     Neuroma nos     Osteoarthritis     Osteopenia     Post-laminectomy syndrome     Rotator cuff tear     Dr. Melo Sauceda Squamous cell cancer of skin of right hand 05/2018    squamous cell carcinoma    Stress incontinence, female     Urinary incontinence        SURGICAL HISTORY       Past Surgical History:   Procedure Laterality Date    BREAST SURGERY      cyst removal    COLONOSCOPY  2006    CORONARY ANGIOPLASTY WITH STENT PLACEMENT      x1    HC INJECTION PROCEDURE FOR SACROILIAC JOINT Bilateral 7/17/2020    BILATERAL SACROILIAC JOINT INJECTION WITH FLUOROSCOPY performed by Francisco J Perez MD at [address] [place] [address]  5/19/14    LUMBAR 4-5 DECOMPRESSIVE LAMINECTOMY AND POSTERIOR LATERAL FUSION LUMBAR 4-5 AND PEDICLE SCREW FIXATION     LAMINECTOMY POSTERIOR THORACIC / LUMBAR Right 4/9/2019    THORACIC LAMINECTOMY WITH PADDLE LEAD AND RECHARGEBLE BATTERY IN RIGHT HIP performed by Sarah Quinn MD at [facility]  10/02/2019    R nasal sidewall    ID ARTHROCENTESIS ASPIR&/INJ INTERM JT/BURS W/O US N/A 10/24/2018    MIDLINE CAUDEL INJECTION WITH FLUOROSCOPY performed by Francisco J Perez MD at [facility]  2002    stent, LAD    SKIN CANCER EXCISION Right 05/24/2018    Right dorsal hand, Dr. Ketan Toro N/A 3/11/2019    SPINAL CORD STIMULATOR IMPLANT TRIAL WITH FLUOROSCOPY WITH NEVRO performed by Francisco J Perez MD at [facility]      age 10       CURRENT MEDICATIONS       Previous Medications    ACETAMINOPHEN (TYLENOL) 325 MG TABLET    Take 650 mg by mouth every 6 hours as needed for Pain    ASPIRIN 81 MG TABLET    Take 81 mg by mouth daily    BISOPROLOL-HYDROCHLOROTHIAZIDE (ZIAC) 10-6.25 MG PER TABLET    TAKE 1 TABLET DAILY    CALCIUM-VITAMIN D (CALTRATE 600 PLUS-VIT D PO)    Take  by mouth.     EVOLOCUMAB 140 MG/ML SOAJ    Inject 140 mLs into the skin every 30 days    GABAPENTIN (NEURONTIN) None     Emotionally abused: None     Physically abused: None     Forced sexual activity: None   Other Topics Concern    None   Social History Narrative    None       SCREENINGS           PHYSICAL EXAM    (5+ for level 4, 8+ for level 5)     ED Triage Vitals [10/05/20 1146]   BP Temp Temp Source Pulse Resp SpO2 Height Weight   (!) 149/77 98.3 °F (36.8 °C) Oral 74 18 96 % 5' 4\" (1.626 m) 198 lb (89.8 kg)       Physical Exam  Vitals signs and nursing note reviewed. Constitutional:       General: She is not in acute distress. Appearance: Normal appearance. She is not toxic-appearing or diaphoretic. HENT:      Head: Normocephalic and atraumatic. Right Ear: External ear normal.      Left Ear: External ear normal.      Nose: Nose normal.      Mouth/Throat:      Mouth: Mucous membranes are moist.      Pharynx: Oropharynx is clear. Eyes:      General: No scleral icterus. Right eye: No discharge. Left eye: No discharge. Extraocular Movements: Extraocular movements intact. Conjunctiva/sclera: Conjunctivae normal.      Pupils: Pupils are equal, round, and reactive to light. Neck:      Musculoskeletal: Normal range of motion and neck supple. Cardiovascular:      Pulses: Normal pulses. Pulmonary:      Effort: Pulmonary effort is normal. No respiratory distress. Breath sounds: Normal breath sounds. No stridor. Abdominal:      General: Abdomen is flat. There is no distension. Musculoskeletal:         General: Swelling and tenderness present. No deformity or signs of injury. Right lower leg: No edema. Left lower leg: No edema. Comments: Patient with slight effusion of the left knee. She has tenderness to palpation along the medial joint line. Neurovascular intact distally. Quadricep tendon function intact. Skin:     General: Skin is warm and dry. Capillary Refill: Capillary refill takes less than 2 seconds.       Coloration: Skin is not jaundiced or pale.      Findings: No bruising, erythema, lesion or rash. Neurological:      General: No focal deficit present. Mental Status: She is alert and oriented to person, place, and time. Cranial Nerves: No cranial nerve deficit. Sensory: No sensory deficit. Motor: No weakness. Psychiatric:         Mood and Affect: Mood normal.         Behavior: Behavior normal.         Thought Content: Thought content normal.         Judgment: Judgment normal.         DIAGNOSTIC RESULTS       RADIOLOGY (Per Emergency Physician): Interpretation per the Radiologist below, if available at the time of this note:  Xr Knee Left (3 Views)    Result Date: 10/5/2020  EXAMINATION: THREE XRAY VIEWS OF THE LEFT KNEE 10/5/2020 12:20 pm COMPARISON: None. HISTORY: ORDERING SYSTEM PROVIDED HISTORY: pain TECHNOLOGIST PROVIDED HISTORY: Reason for exam:->pain Reason for Exam: Knee Pain (pt states after sitting for two hours last night watching a movie, she got up and felt pain \"giving out feeling\" in left knee) Acuity: Acute Type of Exam: Initial FINDINGS: No acute fracture or dislocation. Joint spaces are preserved with mild osteoarthritic spurring, best seen at the patellofemoral joint. Mild enthesopathy at the quadriceps insertion on the patella. Small joint effusion is suggested. No acute osseous abnormality of the left knee. Mild osteoarthritic changes with small joint effusion. Ct Knee Left Wo Contrast    1. No acute osseous abnormality in the left knee. 2.  Small joint effusion. 3.  Mild degenerative changes. LABS:  Labs Reviewed - No data to display    All other labs were within normal range or not returned as of this dictation.     EMERGENCY DEPARTMENT COURSE and DIFFERENTIAL DIAGNOSIS/MDM:   Vitals:    Vitals:    10/05/20 1146   BP: (!) 149/77   Pulse: 74   Resp: 18   Temp: 98.3 °F (36.8 °C)   TempSrc: Oral   SpO2: 96%   Weight: 198 lb (89.8 kg)   Height: 5' 4\" (1.626 m)       Medications - No data to display    MDM. X-ray ordered. I reviewed the x-ray at bedside but did not see obvious fracture. Given the fact that she is reporting worsening pain with ambulation-severe bone CT will be ordered to rule out occult tibial plateau fx. Imaging reviewed as above. No tibial plateau fx. Small joint effusion present. Recommend knee immobilizer, patient already has a walker which she will use at home to take some of the weight off of her leg. Recommend orthopedic follow-up. Concern for possible internal derangement. No evidence of any quadricep tendon function abnormalities here. Patient will be placed on Mobic and instructed to utilize rice. Given strict return precautions. Patient instructed to follow up with their primary care doctor in one-two days and return to the emergency department if worsening of the condition or any other concerns. Please see discharge instructions for further delineation regarding the specific discharge instructions explained and given to the patient. CONSULTS:  None    PROCEDURES:  Unless otherwise noted below, none     Procedures    FINAL IMPRESSION      1. Acute pain of left knee    2. Knee effusion, left    3.  Internal derangement of left knee          DISPOSITION/PLAN   DISPOSITION Decision To Discharge 10/05/2020 01:48:48 PM      PATIENT REFERRED TO:  Maria Fernanda Nance, [address] [facility] [address]    Schedule an appointment as soon as possible for a visit       Tomas Mason MD  [address] [facility]  [phone]    Schedule an appointment as soon as possible for a visit       [facility]  [address]  [phone]    If symptoms worsen      DISCHARGE MEDICATIONS:  New Prescriptions    MELOXICAM (MOBIC) 7.5 MG TABLET    Take 1 tablet by mouth daily as needed for Pain          (Please note:  Portions of this note were completed with a voice recognition program.

## 2020-10-08 ENCOUNTER — OFFICE VISIT (OUTPATIENT)
Dept: ORTHOPEDIC SURGERY | Age: 80
End: 2020-10-08
Payer: MEDICARE

## 2020-10-08 VITALS — HEIGHT: 64 IN | WEIGHT: 196 LBS | TEMPERATURE: 97.1 F | BODY MASS INDEX: 33.46 KG/M2

## 2020-10-08 PROCEDURE — 1123F ACP DISCUSS/DSCN MKR DOCD: CPT | Performed by: PHYSICIAN ASSISTANT

## 2020-10-08 PROCEDURE — G8417 CALC BMI ABV UP PARAM F/U: HCPCS | Performed by: PHYSICIAN ASSISTANT

## 2020-10-08 PROCEDURE — G8484 FLU IMMUNIZE NO ADMIN: HCPCS | Performed by: PHYSICIAN ASSISTANT

## 2020-10-08 PROCEDURE — 1090F PRES/ABSN URINE INCON ASSESS: CPT | Performed by: PHYSICIAN ASSISTANT

## 2020-10-08 PROCEDURE — G8427 DOCREV CUR MEDS BY ELIG CLIN: HCPCS | Performed by: PHYSICIAN ASSISTANT

## 2020-10-08 PROCEDURE — 1036F TOBACCO NON-USER: CPT | Performed by: PHYSICIAN ASSISTANT

## 2020-10-08 PROCEDURE — 4040F PNEUMOC VAC/ADMIN/RCVD: CPT | Performed by: PHYSICIAN ASSISTANT

## 2020-10-08 PROCEDURE — 99213 OFFICE O/P EST LOW 20 MIN: CPT | Performed by: PHYSICIAN ASSISTANT

## 2020-10-08 PROCEDURE — G8399 PT W/DXA RESULTS DOCUMENT: HCPCS | Performed by: PHYSICIAN ASSISTANT

## 2020-10-08 NOTE — PROGRESS NOTES
Patient [address],Second Floor [facility] Record Number: [id number]  YOB: 1940  Date of Encounter: 10/8/2020     Chief Complaint   Patient presents with    New Patient     Left knee pain for several years, pain increased on Sunday after standing up from sitting. History of Present Illness:  Ashlee Moreno is a 79 y.o. female here at the recommendation of [facility] emergency department for evaluation of her left knee. Her pain assessment is documented below and I reviewed this with her today. Patient states she has only had very occasional \"twinges\" in the left knee. She states 4 days ago she was getting up from sitting when she felt a pop in the left knee and the left knee collapsed. She went to the emergency department and had an x-ray and a CT scan which were both negative for fracture. She was found to have a small joint effusion. She was given a knee immobilizer secondary to instability of the left knee. She is here today using her walker. She states she is still having left knee pain she rates an 8/10. All of her pain is along the medial aspect of the left knee. Pain Assessment  Location of Pain: Knee  Location Modifiers: Left, Anterior, Medial  Severity of Pain: 8  Quality of Pain: Aching, Sharp, Buckling  Duration of Pain: Persistent  Frequency of Pain: Constant  Date Pain First Started: (several years ago)  Aggravating Factors:  Other (Comment)(pivoting)  Limiting Behavior: Some  Relieving Factors: Rest  Result of Injury: No  Work-Related Injury: No  Are there other pain locations you wish to document?: No    Past Medical History:   Diagnosis Date    Acquired spondylolisthesis of lumbosacral region     Bilateral primary osteoarthritis of hip     CAD (coronary artery disease)     one stent in heart    Chronic bilateral low back pain without sciatica 2/8/2018    CKD (chronic kidney disease), stage III     DDD (degenerative disc disease), lumbar     Dr. Julio Crow  Fibromyalgia     Dr. Jos Mo    GERD (gastroesophageal reflux disease)     Headache(784.0)     Heel spur 08/2016    History of lumbar spinal fusion     Hyperlipidemia     Hypertension     IBS (irritable bowel syndrome)     Lyme disease 2009    suspected    Medical history reviewed with no changes     MVP (mitral valve prolapse)     Neuroma nos     Osteoarthritis     Osteopenia     Post-laminectomy syndrome     Rotator cuff tear     Dr. Sixto Hammer Squamous cell cancer of skin of right hand 05/2018    squamous cell carcinoma    Stress incontinence, female     Urinary incontinence        Past Surgical History:   Procedure Laterality Date    BREAST SURGERY      cyst removal    COLONOSCOPY  2006    CORONARY ANGIOPLASTY WITH STENT PLACEMENT      x1    HC INJECTION PROCEDURE FOR SACROILIAC JOINT Bilateral 7/17/2020    BILATERAL SACROILIAC JOINT INJECTION WITH FLUOROSCOPY performed by Sowmya Ramachandran MD at [address] [place] [address]  5/19/14    LUMBAR 4-5 DECOMPRESSIVE LAMINECTOMY AND POSTERIOR LATERAL FUSION LUMBAR 4-5 AND PEDICLE SCREW FIXATION     LAMINECTOMY POSTERIOR THORACIC / LUMBAR Right 4/9/2019    THORACIC LAMINECTOMY WITH PADDLE LEAD AND RECHARGEBLE BATTERY IN RIGHT HIP performed by Carmelina Katz MD at [facility]  10/02/2019    R nasal sidewall    PA ARTHROCENTESIS ASPIR&/INJ INTERM JT/BURS W/O US N/A 10/24/2018    MIDLINE CAUDEL INJECTION WITH FLUOROSCOPY performed by Sowmya Ramachandran MD at [facility]  2002    stent, LAD    SKIN CANCER EXCISION Right 05/24/2018    Right dorsal hand, Dr. Randell Rosa N/A 3/11/2019    SPINAL CORD STIMULATOR IMPLANT TRIAL WITH FLUOROSCOPY WITH NEVRO performed by Sowmya Ramachandran MD at [address]      age 10       Current Outpatient Medications   Medication Sig Dispense Refill    meloxicam (MOBIC) 7.5 MG tablet Take 1 tablet by mouth daily as needed for Pain 30 tablet 0    Examination:    Inspection: Normal muscle contours and no significant limb length discrepancy. No gross atrophy in any particular myotome. There is a small joint effusion. There are no abrasions, lacerations, contusions, hematomas or ecchymosis. There is no erythema, induration or warmth to suggest an infectious process. Palpation: Patient has significant tenderness on palpation of the medial knee and medial joint line. Range of Motion:    Flexion: 190°   Extension: 0°    Strength: Strength testing is not reliable secondary to pain. Special Tests: Special testing is very limited secondary to pain    Skin: There are no rashes, ulcerations or lesions. Sensation to light touch intact. Circulation: The limb is warm and well perfused. Distal pulses intact. Capillary refill is intact. Edema: none. Venous stasis changes: none. Gait: Patient has a antalgic gait and is taking short strides. Patient is using a knee immobilizer and a walker    Radiology:  Left knee x-rays and CT scan completed in the emergency department on 10/5/2020 and reviewed in office today:    FINDINGS:    No acute fracture or dislocation.  Joint spaces are preserved with mild    osteoarthritic spurring, best seen at the patellofemoral joint.  Mild    enthesopathy at the quadriceps insertion on the patella.  Small joint    effusion is suggested. FINDINGS:    Bones: There is no acute fracture or dislocation.  No suspicious osteolytic    or sclerotic lesions.         Soft Tissue: Francine  is a small joint effusion.  No evidence of    lipohemarthrosis.  No soft tissue hematoma or gas.  No significant Baker's    cyst.  Visualized muscle attenuation is within normal limits.  Mild scattered    vascular calcifications.         Joint:  There is mild tricompartmental osteophytic spurring.  Medial    compartment joint space is mildly narrowed. Impression:  Encounter Diagnoses   Name Primary?     Tear of medial meniscus of left knee,

## 2020-10-12 ENCOUNTER — HOSPITAL ENCOUNTER (OUTPATIENT)
Dept: MRI IMAGING | Age: 80
Discharge: HOME | End: 2020-10-12
Payer: MEDICARE

## 2020-10-22 ENCOUNTER — HOSPITAL ENCOUNTER (OUTPATIENT)
Dept: MRI IMAGING | Age: 80
Discharge: HOME | End: 2020-10-22
Payer: MEDICARE

## 2020-10-22 PROCEDURE — 73721 MRI JNT OF LWR EXTRE W/O DYE: CPT

## 2020-10-23 ENCOUNTER — TELEPHONE (OUTPATIENT)
Dept: ORTHOPEDIC SURGERY | Age: 80
End: 2020-10-23

## 2020-10-23 NOTE — TELEPHONE ENCOUNTER
Can you help patient schedule an appointment with Dr. May Arzate to discuss MRI results and treatment options?  Thanks

## 2020-10-26 ENCOUNTER — OFFICE VISIT (OUTPATIENT)
Dept: ORTHOPEDIC SURGERY | Age: 80
End: 2020-10-26
Payer: MEDICARE

## 2020-10-26 VITALS — BODY MASS INDEX: 33.46 KG/M2 | WEIGHT: 196 LBS | TEMPERATURE: 97.1 F | HEIGHT: 64 IN

## 2020-10-26 PROBLEM — M25.562 ACUTE PAIN OF LEFT KNEE: Status: ACTIVE | Noted: 2020-10-26

## 2020-10-26 PROBLEM — M25.462 EFFUSION OF LEFT KNEE JOINT: Status: ACTIVE | Noted: 2020-10-26

## 2020-10-26 PROBLEM — S83.242A TEAR OF MEDIAL MENISCUS OF LEFT KNEE, CURRENT: Status: ACTIVE | Noted: 2020-10-26

## 2020-10-26 PROCEDURE — 1123F ACP DISCUSS/DSCN MKR DOCD: CPT | Performed by: ORTHOPAEDIC SURGERY

## 2020-10-26 PROCEDURE — G8417 CALC BMI ABV UP PARAM F/U: HCPCS | Performed by: ORTHOPAEDIC SURGERY

## 2020-10-26 PROCEDURE — 99213 OFFICE O/P EST LOW 20 MIN: CPT | Performed by: ORTHOPAEDIC SURGERY

## 2020-10-26 PROCEDURE — 1090F PRES/ABSN URINE INCON ASSESS: CPT | Performed by: ORTHOPAEDIC SURGERY

## 2020-10-26 PROCEDURE — G8484 FLU IMMUNIZE NO ADMIN: HCPCS | Performed by: ORTHOPAEDIC SURGERY

## 2020-10-26 PROCEDURE — 4040F PNEUMOC VAC/ADMIN/RCVD: CPT | Performed by: ORTHOPAEDIC SURGERY

## 2020-10-26 PROCEDURE — 1036F TOBACCO NON-USER: CPT | Performed by: ORTHOPAEDIC SURGERY

## 2020-10-26 PROCEDURE — G8427 DOCREV CUR MEDS BY ELIG CLIN: HCPCS | Performed by: ORTHOPAEDIC SURGERY

## 2020-10-26 PROCEDURE — G8399 PT W/DXA RESULTS DOCUMENT: HCPCS | Performed by: ORTHOPAEDIC SURGERY

## 2020-10-26 RX ORDER — MELOXICAM 7.5 MG/1
7.5 TABLET ORAL DAILY PRN
Qty: 30 TABLET | Refills: 0 | Status: SHIPPED | OUTPATIENT
Start: 2020-10-26 | End: 2020-12-08 | Stop reason: ALTCHOICE

## 2020-10-26 RX ORDER — MELOXICAM 15 MG/1
15 TABLET ORAL DAILY PRN
Qty: 30 TABLET | Refills: 0 | Status: CANCELLED | OUTPATIENT
Start: 2020-10-26

## 2020-10-26 NOTE — PROGRESS NOTES
type, initial encounter     2. Acute pain of left knee     3. Effusion of left knee joint         Plan:  We discussed treatment options today. I informed her the MRI shows natural degenerative changes and tearing of the medial meniscus. I informed her that at her age meniscal tears are not uncommon to see especially with some underlying arthritic changes in the knee. Not all of these patients require any surgical intervention. Currently, she feels like she is making some steady improvements. I did mention if she were to begin declining in function, we can look into a cortisone injection and a course of physical therapy prior to considering any surgical interventions. She did say at this time, she does not feel that is necessary and she would prefer some exercises at home. She is currently taking Mobic as her anti-inflammatory and she did ask for a refill. This was provided. She was also given meniscus tear exercises today and she can do these at home. I advised her to call back if she regresses and would like to come back for the knee cortisone injection or outpatient physical therapy. Otherwise, she can follow-up as needed. She understands and accepts this course of care. By signing my name below, Luann Flores, attest that this documentation has been prepared under the direction and in the presence of Sae Plascencia MD.   Electronically Signed: Sharmin Weber, 10/26/20, 11:56 AM EDT. Becky Nance MD, personally performed the services described in this documentation. All medical record entries made by the lindseyibe were at my direction and in my presence. I have reviewed the chart and discharge instructions (if applicable) and agree that the record reflects my personal performance and is accurate and complete. Sae Plascencia MD, 11/2/20, 11:47 AM EST. Documentation was done using voice recognition dragon software.   Every effort was made to ensure accuracy; however, inadvertent  Unintentional computerized transcription errors may be present.

## 2020-11-30 NOTE — TELEPHONE ENCOUNTER
Last appointment: 3/27/2020  Next appointment: Visit date not found. Overdue.  AYLIEN message sent to patient  Last refill: 8/21/2020  #[id number] x0

## 2020-12-01 RX ORDER — GABAPENTIN 300 MG/1
CAPSULE ORAL
Qty: 90 CAPSULE | Refills: 0 | Status: SHIPPED | OUTPATIENT
Start: 2020-12-01 | End: 2021-02-26

## 2020-12-01 RX ORDER — TRAMADOL HYDROCHLORIDE 50 MG/1
TABLET ORAL
Qty: 120 TABLET | Refills: 0 | Status: SHIPPED | OUTPATIENT
Start: 2020-12-01 | End: 2021-03-02

## 2020-12-01 NOTE — TELEPHONE ENCOUNTER
Last appointment: 3/27/2020  Next appointment: Visit date not found  Pt has been notified  Last refill: 8/2020

## 2020-12-08 ENCOUNTER — VIRTUAL VISIT (OUTPATIENT)
Dept: INTERNAL MEDICINE CLINIC | Age: 80
End: 2020-12-08
Payer: MEDICARE

## 2020-12-08 PROBLEM — M23.204 DEGENERATIVE TEAR OF LEFT MEDIAL MENISCUS: Status: ACTIVE | Noted: 2020-12-08

## 2020-12-08 PROCEDURE — 1123F ACP DISCUSS/DSCN MKR DOCD: CPT | Performed by: INTERNAL MEDICINE

## 2020-12-08 PROCEDURE — 1090F PRES/ABSN URINE INCON ASSESS: CPT | Performed by: INTERNAL MEDICINE

## 2020-12-08 PROCEDURE — G8428 CUR MEDS NOT DOCUMENT: HCPCS | Performed by: INTERNAL MEDICINE

## 2020-12-08 PROCEDURE — G8399 PT W/DXA RESULTS DOCUMENT: HCPCS | Performed by: INTERNAL MEDICINE

## 2020-12-08 PROCEDURE — 99214 OFFICE O/P EST MOD 30 MIN: CPT | Performed by: INTERNAL MEDICINE

## 2020-12-08 PROCEDURE — 4040F PNEUMOC VAC/ADMIN/RCVD: CPT | Performed by: INTERNAL MEDICINE

## 2020-12-08 RX ORDER — METHYLPREDNISOLONE 4 MG/1
4 TABLET ORAL SEE ADMIN INSTRUCTIONS
Qty: 1 KIT | Refills: 1 | Status: SHIPPED | OUTPATIENT
Start: 2020-12-08 | End: 2020-12-14

## 2020-12-08 NOTE — PROGRESS NOTES
Farnaz Rodriguez is a 79 y.o. female  evaluated via telephone on 12/8/20          [facility]) Physicians  Internal Medicine  Patient Encounter  Gennette Landau, D.O., [facility]     Consent:  She and/or health care decision maker is aware that that she may receive a bill for this telephone service, depending on her insurance coverage, and has provided verbal consent to proceed: Yes      Documentation:    Chief Complaint   Patient presents with    Check-Up    Medication Check         79 y.o. female being evaluated via a phone consultation visit due to the coronavirus pandemic emergency and public health crisis and inability to see the patient face-to-face in the office. Patient being evaluated regarding the status of current chronic medical problems as the below along with a medication review and reconciliation. Patient has been evaluated by her orthopedist, Dr. Jeremy Allen regarding left knee meniscus tear. Office note reviewed. Patient was seen by her cardiologist on 9/30/2020. Office note reviewed. Patient has a history of coronary artery disease, hypertension, chronic low back pain, fibromyalgia, GERD, hyperlipidemia, chronic kidney disease stage III. She is doing well with the Repatha. Her dose was decrease to every 30 days. She is maintained on Tramadol for her low back pain. She was seen on the ER 10/5/2020 for left knee pain. She also has right knee pain. Meloxicam was given. The orthopedist also gave her Meloxicam.  She has CKD. She should not be on NSAID's. MRI showed a medial meniscus tear. Her BP has been higher than usual.  She has seen nephrology in the past.  She saw Dr. Amrit Mathias. She denies CP, SOB, swelling, lightheadedness, syncope, palpitations.             Past Medical History:   Diagnosis Date    Acquired spondylolisthesis of lumbosacral region     Bilateral primary osteoarthritis of hip     CAD (coronary artery disease)     one stent in heart    Chronic 12/7/2020   Patient-Reported Weight 196   Patient-Reported Height 5'4\"   Patient-Reported Systolic 548   Patient-Reported Diastolic 82   Patient-Reported Pulse 69   Patient-Reported Temperature 97.7                I communicated with the patient and/or health care decision maker about chronic medical problems and medications along with her knee pain complaint    Details of this discussion including any medical advice provided: See below    Encounter Diagnoses   Name Primary?  Benign essential HTN Yes    CAD in native artery     Stage 3a chronic kidney disease     Hyperlipidemia, mixed     Degenerative tear of left medial meniscus     Chronic bilateral low back pain without sciatica          PLAN:  #1 trial of a Medrol Dosepak  #2 discontinue meloxicam.  Avoid NSAIDs given her chronic kidney disease  #3 patient may need a corticosteroid injection. #4 obtain lab tests including CBC, CMP, lipid profile, intact PTH  #5 Pt will continue Tramadol as needed for pain    I affirm this is a Patient Initiated Episode with a Patient who has not had a related appointment within my department in the past 7 days or scheduled within the next 24 hours.     Patient identification was verified at the start of the visit: Yes    Total Time: minutes: 11-20 minutes (20 minutes)    Note: not billable if this call serves to triage the patient into an appointment for the relevant concern      Cat Burrell

## 2020-12-10 ENCOUNTER — PATIENT MESSAGE (OUTPATIENT)
Dept: INTERNAL MEDICINE CLINIC | Age: 80
End: 2020-12-10

## 2020-12-10 RX ORDER — TRAMADOL HYDROCHLORIDE 50 MG/1
50 TABLET ORAL EVERY 6 HOURS PRN
Qty: 240 TABLET | Refills: 0 | Status: SHIPPED | OUTPATIENT
Start: 2020-12-10 | End: 2021-02-08

## 2020-12-10 NOTE — TELEPHONE ENCOUNTER
From: Adithya Montalvo  To: Hafsa Enamorado DO  Sent: 12/10/2020 10:05 AM EST  Subject: Prescription Question    I received my refill of Tramadol from [facility]. It contained only 120 tablets instead of the usual 360 for 90 days. Was there a deliberate change in the order? Have regulations changed? Or did the mail-order pharmacy make a mistake? Or something else you can tell me about this?     Thank you,  Ave [place]

## 2020-12-11 ENCOUNTER — HOSPITAL ENCOUNTER (OUTPATIENT)
Age: 80
Discharge: HOME | End: 2020-12-11
Payer: MEDICARE

## 2020-12-11 LAB
A/G RATIO: 1.2 (ref 1.1–2.2)
ALBUMIN SERPL-MCNC: 4.1 G/DL (ref 3.4–5)
ALP BLD-CCNC: 79 U/L (ref 40–129)
ALT SERPL-CCNC: 15 U/L (ref 10–40)
ANION GAP SERPL CALCULATED.3IONS-SCNC: 10 MMOL/L (ref 3–16)
AST SERPL-CCNC: 22 U/L (ref 15–37)
BASOPHILS ABSOLUTE: 0.1 K/UL (ref 0–0.2)
BASOPHILS RELATIVE PERCENT: 0.7 %
BILIRUB SERPL-MCNC: 0.5 MG/DL (ref 0–1)
BUN BLDV-MCNC: 31 MG/DL (ref 7–20)
CALCIUM SERPL-MCNC: 10 MG/DL (ref 8.3–10.6)
CHLORIDE BLD-SCNC: 101 MMOL/L (ref 99–110)
CHOLESTEROL, TOTAL: 212 MG/DL (ref 0–199)
CO2: 31 MMOL/L (ref 21–32)
CREAT SERPL-MCNC: 1 MG/DL (ref 0.6–1.2)
EOSINOPHILS ABSOLUTE: 0 K/UL (ref 0–0.6)
EOSINOPHILS RELATIVE PERCENT: 0.1 %
GFR AFRICAN AMERICAN: >60
GFR NON-AFRICAN AMERICAN: 53
GLOBULIN: 3.4 G/DL
GLUCOSE BLD-MCNC: 104 MG/DL (ref 70–99)
HCT VFR BLD CALC: 45.1 % (ref 36–48)
HDLC SERPL-MCNC: 64 MG/DL (ref 40–60)
HEMOGLOBIN: 15.3 G/DL (ref 12–16)
LDL CHOLESTEROL CALCULATED: 118 MG/DL
LYMPHOCYTES ABSOLUTE: 1.7 K/UL (ref 1–5.1)
LYMPHOCYTES RELATIVE PERCENT: 19.1 %
MCH RBC QN AUTO: 33.6 PG (ref 26–34)
MCHC RBC AUTO-ENTMCNC: 33.9 G/DL (ref 31–36)
MCV RBC AUTO: 99.1 FL (ref 80–100)
MONOCYTES ABSOLUTE: 0.4 K/UL (ref 0–1.3)
MONOCYTES RELATIVE PERCENT: 4.4 %
NEUTROPHILS ABSOLUTE: 6.9 K/UL (ref 1.7–7.7)
NEUTROPHILS RELATIVE PERCENT: 75.7 %
PARATHYROID HORMONE INTACT: 69.9 PG/ML (ref 14–72)
PDW BLD-RTO: 13.7 % (ref 12.4–15.4)
PLATELET # BLD: 198 K/UL (ref 135–450)
PMV BLD AUTO: 10.2 FL (ref 5–10.5)
POTASSIUM SERPL-SCNC: 4.3 MMOL/L (ref 3.5–5.1)
RBC # BLD: 4.55 M/UL (ref 4–5.2)
SODIUM BLD-SCNC: 142 MMOL/L (ref 136–145)
TOTAL PROTEIN: 7.5 G/DL (ref 6.4–8.2)
TRIGL SERPL-MCNC: 152 MG/DL (ref 0–150)
VLDLC SERPL CALC-MCNC: 30 MG/DL
WBC # BLD: 9.1 K/UL (ref 4–11)

## 2020-12-11 PROCEDURE — 83970 ASSAY OF PARATHORMONE: CPT

## 2020-12-11 PROCEDURE — 80053 COMPREHEN METABOLIC PANEL: CPT

## 2020-12-11 PROCEDURE — 36415 COLL VENOUS BLD VENIPUNCTURE: CPT

## 2020-12-11 PROCEDURE — 80061 LIPID PANEL: CPT

## 2020-12-11 PROCEDURE — 85025 COMPLETE CBC W/AUTO DIFF WBC: CPT

## 2020-12-17 ENCOUNTER — OFFICE VISIT (OUTPATIENT)
Dept: CARDIOLOGY CLINIC | Age: 80
End: 2020-12-17
Payer: MEDICARE

## 2020-12-17 VITALS
HEIGHT: 64 IN | WEIGHT: 199.6 LBS | SYSTOLIC BLOOD PRESSURE: 136 MMHG | TEMPERATURE: 96.9 F | BODY MASS INDEX: 34.08 KG/M2 | HEART RATE: 68 BPM | DIASTOLIC BLOOD PRESSURE: 76 MMHG

## 2020-12-17 PROCEDURE — 1036F TOBACCO NON-USER: CPT | Performed by: INTERNAL MEDICINE

## 2020-12-17 PROCEDURE — G8417 CALC BMI ABV UP PARAM F/U: HCPCS | Performed by: INTERNAL MEDICINE

## 2020-12-17 PROCEDURE — 99214 OFFICE O/P EST MOD 30 MIN: CPT | Performed by: INTERNAL MEDICINE

## 2020-12-17 PROCEDURE — 1123F ACP DISCUSS/DSCN MKR DOCD: CPT | Performed by: INTERNAL MEDICINE

## 2020-12-17 PROCEDURE — G8399 PT W/DXA RESULTS DOCUMENT: HCPCS | Performed by: INTERNAL MEDICINE

## 2020-12-17 PROCEDURE — 1090F PRES/ABSN URINE INCON ASSESS: CPT | Performed by: INTERNAL MEDICINE

## 2020-12-17 PROCEDURE — 4040F PNEUMOC VAC/ADMIN/RCVD: CPT | Performed by: INTERNAL MEDICINE

## 2020-12-17 PROCEDURE — G8427 DOCREV CUR MEDS BY ELIG CLIN: HCPCS | Performed by: INTERNAL MEDICINE

## 2020-12-17 PROCEDURE — G8484 FLU IMMUNIZE NO ADMIN: HCPCS | Performed by: INTERNAL MEDICINE

## 2020-12-17 ASSESSMENT — ENCOUNTER SYMPTOMS
VOMITING: 0
BACK PAIN: 0
COUGH: 0
ABDOMINAL DISTENTION: 0
COLOR CHANGE: 0
BLOOD IN STOOL: 0
SHORTNESS OF BREATH: 1
EYE DISCHARGE: 0
CHEST TIGHTNESS: 0
ABDOMINAL PAIN: 0
FACIAL SWELLING: 0
WHEEZING: 0

## 2020-12-17 NOTE — PROGRESS NOTES
[facility]     Outpatient Cardiology         Chief Complaint   Patient presents with    3 Month Follow-Up       HPI      Trevor Lopez a 79 y.o. female here for follow up for CAD/HTN/HLD. CAD, asymptomatic, recent stress test ok, doing well on current medications. Does not complain of any chest pain or tightness. Hypertension, well-controlled current medications. No changes. Hyperlipidemia, less side effects to PCSK9, , will continue once a month. Shortness of breath, very mild nature, likely related to deconditioning and weight gain. We talked about trying low-dose Lasix although she is already on HCTZ, would like to continue current medications and in 6 months we will revisit the idea of starting Lasix.       PMH  Past Medical History:   Diagnosis Date    Acquired spondylolisthesis of lumbosacral region     Bilateral primary osteoarthritis of hip     CAD (coronary artery disease)     one stent in heart    Chronic bilateral low back pain without sciatica 2/8/2018    CKD (chronic kidney disease), stage III     DDD (degenerative disc disease), lumbar     Dr. Ana Call GERD (gastroesophageal reflux disease)     Headache(784.0)     Heel spur 08/2016    History of lumbar spinal fusion     Hyperlipidemia     Hypertension     IBS (irritable bowel syndrome)     Lyme disease 2009    suspected    Medical history reviewed with no changes     MVP (mitral valve prolapse)     Neuroma nos     Osteoarthritis     Osteopenia     Post-laminectomy syndrome     Rotator cuff tear     Dr. Henrry Spann    Squamous cell cancer of skin of right hand 05/2018    squamous cell carcinoma    Stress incontinence, female     Urinary incontinence        PSH  Past Surgical History:   Procedure Laterality Date    BREAST SURGERY      cyst removal    COLONOSCOPY  2006    CORONARY ANGIOPLASTY WITH STENT PLACEMENT      x1    HC INJECTION PROCEDURE FOR SACROILIAC JOINT Bilateral 7/17/2020    BILATERAL SACROILIAC JOINT INJECTION WITH FLUOROSCOPY performed by Tegan Sandoval MD at [facility]  5/19/14    LUMBAR 4-5 DECOMPRESSIVE LAMINECTOMY AND POSTERIOR LATERAL FUSION LUMBAR 4-5 AND PEDICLE SCREW FIXATION     LAMINECTOMY POSTERIOR THORACIC / LUMBAR Right 4/9/2019    THORACIC LAMINECTOMY WITH PADDLE LEAD AND RECHARGEBLE BATTERY IN RIGHT HIP performed by Pushpa Lucas MD at [address]  10/02/2019    R nasal sidewall    MO ARTHROCENTESIS ASPIR&/INJ INTERM JT/BURS W/O US N/A 10/24/2018    MIDLINE CAUDEL INJECTION WITH FLUOROSCOPY performed by Tegan Sandoval MD at [facility]  2002    stent, LAD    SKIN CANCER EXCISION Right 05/24/2018    Right dorsal hand, Dr. Felix Arzola N/A 3/11/2019    SPINAL CORD STIMULATOR IMPLANT TRIAL WITH FLUOROSCOPY WITH NEVRO performed by Tegan Sandoval MD at [facility]      age 10        Social HIstory  Social History     Tobacco Use    Smoking status: Never Smoker    Smokeless tobacco: Never Used   Substance Use Topics    Alcohol use: No    Drug use: Never       Family History  Family History   Problem Relation Age of Onset    Diabetes Brother     Diabetes Mother     Heart Disease Mother     Heart Disease Father     Cancer Father         skin       Allergies   Allergies   Allergen Reactions    Aquaphor [Aquaphilic]     Other     Statins Other (See Comments)     Severe muscle pain    Adhesive Tape Itching and Rash     Pt Okay with paper tape       Medications:     Home Medications:  Were reviewed and are listed in nursing record. and/or listed below    Prior to Admission medications    Medication Sig Start Date End Date Taking? Authorizing Provider   traMADol (ULTRAM) 50 MG tablet Take 1 tablet by mouth every 6 hours as needed for Pain for up to 60 days. Intended supply: 7 days.  Take lowest dose possible to manage pain 12/10/20 2/8/21 Yes Hill James DO   traMADol (ULTRAM) 50 MG tablet TAKE 1 TABLET EVERY 6 HOURS AS NEEDED FOR PAIN FOR UP TO 90 DAYS (REDUCE DOSES TAKEN AS PAIN BECOMES MANAGEABLE) 12/1/20 3/1/21 Yes Hill James DO   gabapentin (NEURONTIN) 300 MG capsule TAKE 1 CAPSULE BY MOUTH EVERY NIGHT 12/1/20 3/1/21 Yes Hill James DO   Evolocumab 140 MG/ML SOAJ Inject 140 mLs into the skin every 30 days 9/22/20  Yes Livia Vick MD   irbesartan (AVAPRO) 300 MG tablet TAKE 1 TABLET DAILY 8/17/20  Yes Livia Vick MD   hydroCHLOROthiazide (MICROZIDE) 12.5 MG capsule TAKE 1 CAPSULE DAILY 8/17/20  Yes Hill James DO   bisoprolol-hydrochlorothiazide (ZIAC) 10-6.25 MG per tablet TAKE 1 TABLET DAILY 8/17/20  Yes Hill James DO   vitamin E 400 UNIT capsule Take 400 Units by mouth nightly   Yes Historical Provider, MD   aspirin 81 MG tablet Take 81 mg by mouth daily   Yes Historical Provider, MD   Multiple Vitamins-Minerals (THERAPEUTIC MULTIVITAMIN-MINERALS) tablet Take 1 tablet by mouth daily   Yes Historical Provider, MD   acetaminophen (TYLENOL) 325 MG tablet Take 650 mg by mouth every 6 hours as needed for Pain   Yes Historical Provider, MD   melatonin (RA MELATONIN) 3 MG TABS tablet Take 1 tablet by mouth nightly. 9/17/14  Yes Hill James DO   Calcium-Vitamin D (CALTRATE 600 PLUS-VIT D PO) Take  by mouth. Yes Historical Provider, MD   Misc Natural Products (OSTEO BI-FLEX TRIPLE STRENGTH PO) Take 1 tablet by mouth 2 times daily    Yes Historical Provider, MD        Review of Systems   Constitutional: Negative for activity change, appetite change, diaphoresis, fatigue, fever and unexpected weight change. HENT: Negative for congestion, facial swelling, mouth sores and nosebleeds. Eyes: Negative for discharge and visual disturbance. Respiratory: Positive for shortness of breath. Negative for cough, chest tightness and wheezing.     Cardiovascular: Negative for chest pain, palpitations and leg swelling. Gastrointestinal: Negative for abdominal distention, abdominal pain, blood in stool and vomiting. Endocrine: Negative for cold intolerance, heat intolerance and polyuria. Genitourinary: Negative for difficulty urinating, dysuria, frequency and hematuria. Musculoskeletal: Negative for back pain, joint swelling, myalgias and neck pain. Skin: Negative for color change, pallor and rash. Allergic/Immunologic: Negative for immunocompromised state. Neurological: Negative for dizziness, syncope, weakness, light-headedness, numbness and headaches. Hematological: Negative for adenopathy. Does not bruise/bleed easily. Psychiatric/Behavioral: Negative for behavioral problems, confusion, decreased concentration and suicidal ideas. The patient is not nervous/anxious. Vitals:    12/17/20 1412   BP: 136/76   Pulse: 68   Temp: 96.9 °F (36.1 °C)    Weight: 199 lb 9.6 oz (90.5 kg)       Vitals:    12/17/20 1412   BP: 136/76   Pulse: 68   Temp: 96.9 °F (36.1 °C)   Weight: 199 lb 9.6 oz (90.5 kg)   Height: 5' 4\" (1.626 m)       BP Readings from Last 3 Encounters:   12/17/20 136/76   10/05/20 (!) 149/77   09/30/20 130/80       Wt Readings from Last 3 Encounters:   12/17/20 199 lb 9.6 oz (90.5 kg)   10/26/20 196 lb (88.9 kg)   10/08/20 196 lb (88.9 kg)       Physical Exam   Constitutional: She is oriented to person, place, and time. She appears well-developed and well-nourished. No distress. HENT:   Head: Normocephalic and atraumatic. Eyes: Pupils are equal, round, and reactive to light. EOM are normal.   Neck: Normal range of motion. No JVD present. No thyromegaly present. Cardiovascular: Normal rate, regular rhythm, S1 normal, S2 normal, normal heart sounds and intact distal pulses. PMI is not displaced. Exam reveals no gallop and no friction rub. No murmur heard. Pulmonary/Chest: Effort normal and breath sounds normal. No stridor. No respiratory distress. She has no wheezes. She has no rales. She exhibits no tenderness. Abdominal: Soft. Bowel sounds are normal. She exhibits no distension. There is no abdominal tenderness. There is no rebound and no guarding. Musculoskeletal: Normal range of motion. General: No tenderness or edema. Lymphadenopathy:     She has no cervical adenopathy. Neurological: She is alert and oriented to person, place, and time. Coordination normal.   Skin: Skin is warm and dry. No rash noted. She is not diaphoretic. No erythema. Psychiatric: She has a normal mood and affect.  Her behavior is normal. Judgment and thought content normal.       Labs:       Lab Results   Component Value Date    WBC 9.1 12/11/2020    HGB 15.3 12/11/2020    HCT 45.1 12/11/2020    MCV 99.1 12/11/2020     12/11/2020     Lab Results   Component Value Date     12/11/2020    K 4.3 12/11/2020     12/11/2020    CO2 31 12/11/2020    BUN 31 (H) 12/11/2020    CREATININE 1.0 12/11/2020    GLUCOSE 104 (H) 12/11/2020    CALCIUM 10.0 12/11/2020    PROT 7.5 12/11/2020    LABALBU 4.1 12/11/2020    BILITOT 0.5 12/11/2020    ALKPHOS 79 12/11/2020    AST 22 12/11/2020    ALT 15 12/11/2020    LABGLOM 53 (A) 12/11/2020    GFRAA >60 12/11/2020    AGRATIO 1.2 12/11/2020    GLOB 3.4 12/11/2020         Lab Results   Component Value Date    CHOL 212 (H) 12/11/2020    CHOL 204 (H) 09/15/2020    CHOL 201 (H) 08/19/2020     Lab Results   Component Value Date    TRIG 152 (H) 12/11/2020    TRIG 304 (H) 09/15/2020    TRIG 274 (H) 08/19/2020     Lab Results   Component Value Date    HDL 64 (H) 12/11/2020    HDL 48 09/15/2020    HDL 52 08/19/2020     Lab Results   Component Value Date    LDLCALC 118 (H) 12/11/2020    LDLCALC see below 09/15/2020    LDLCALC 94 08/19/2020     Lab Results   Component Value Date    LABVLDL 30 12/11/2020    LABVLDL see below 09/15/2020    LABVLDL 55 08/19/2020     No results found for: [facility]    Lab Results   Component Value Date    INR 0.99 04/09/2019    INR 0.90 03/11/2019    INR 0.89 05/13/2014    PROTIME 11.3 04/09/2019    PROTIME 10.3 03/11/2019    PROTIME 10.0 05/13/2014       The 10-year ASCVD risk score (Peggy Duran, et al., 2013) is: 35%    Values used to calculate the score:      Age: 79 years      Sex: Female      Is Non- : No      Diabetic: No      Tobacco smoker: No      Systolic Blood Pressure: 900 mmHg      Is BP treated: Yes      HDL Cholesterol: 64 mg/dL      Total Cholesterol: 212 mg/dL      Imaging:       Last ECG (if available):  NSR, old ant MI, IVCD    Last Stress (if available): 3/19/20   Summary    There is normal isotope uptake at stress and rest. There is no evidence of    myocardial ischemia or scar.    Left ventricular ejection fraction of 71 %.    Overall findings represent a low risk scan. 12/8/14   Summary    Left ventricular ejection fraction of 74 %.    The LV wall motion is normal.    There is normal isotope uptake at stress and rest. There is no evidence of    myocardial ischemia or scar. Last Cath (if available):    Last TTE/ALANNAH(if available): 4/4/19   Summary   Left ventricular cavity size is normal. There is moderate concentric left   ventricular hypertrophy. Overall left ventricular systolic function appears   low normal with an ejection fraction of 50-55%. Abnormal septal motion is   present. Normal diastolic function. Mild tricuspid regurgitation. Estimated   pulmonary artery systolic pressure is at 32 mmHg assuming a right atrial   pressure of 3 mmHg. No prior echo for comparison. Last CMR  (if available):      Assessment / Plan:     SOB (shortness of breath)  Likely some deconditioning and weight gain. Next appointment will likely switch to Lasix instead of HCTZ. Hyperlipidemia, mixed  Tolerating once a month PCSK9. . We will continue no changes. CAD in native artery  Asymptomatic. Continue risk factor modification    Benign essential HTN  Well-controlled current medications.     I had the opportunity to review the clinical symptoms and presentation of Misha Silver. Patient's allergies and medications were reviewed and updated. Patient's past medical, surgical, social and family history were reviewed and updated. Patient's testing including laboratory, ECGs, monitor, imaging (TTE,ALANNAH,CMR,cath) were reviewed. Patient's allergies and medications were reviewed and updated. Patient's past medical, surgical, social and family history were reviewed and updated. Patient's testing including laboratory, ECGs, monitor, imaging (TTE,ALANNAH,CMR,cath) were reviewed. All questions and concerns were addressed to the patient/family. Alternatives to my treatment were discussed. The note was completed using EMR. Every effort was made to ensure accuracy; however, inadvertent computerized transcription errors may be present. Thank you for allowing me to participate in the care or [address]  Thor Ivey MD, [facility] [place]

## 2020-12-17 NOTE — ASSESSMENT & PLAN NOTE
Likely some deconditioning and weight gain. Next appointment will likely switch to Lasix instead of HCTZ.

## 2021-02-26 RX ORDER — GABAPENTIN 300 MG/1
CAPSULE ORAL
Qty: 90 CAPSULE | Refills: 0 | Status: SHIPPED | OUTPATIENT
Start: 2021-02-26 | End: 2021-06-01

## 2021-02-27 DIAGNOSIS — M54.50 CHRONIC BILATERAL LOW BACK PAIN WITHOUT SCIATICA: ICD-10-CM

## 2021-02-27 DIAGNOSIS — M25.552 BILATERAL HIP PAIN: ICD-10-CM

## 2021-02-27 DIAGNOSIS — M47.816 LUMBAR FACET ARTHROPATHY: ICD-10-CM

## 2021-02-27 DIAGNOSIS — M25.551 BILATERAL HIP PAIN: ICD-10-CM

## 2021-02-27 DIAGNOSIS — M48.061 SPINAL STENOSIS OF LUMBAR REGION WITHOUT NEUROGENIC CLAUDICATION: ICD-10-CM

## 2021-02-27 DIAGNOSIS — G89.29 CHRONIC BILATERAL LOW BACK PAIN WITHOUT SCIATICA: ICD-10-CM

## 2021-03-02 RX ORDER — TRAMADOL HYDROCHLORIDE 50 MG/1
50 TABLET ORAL EVERY 6 HOURS PRN
Qty: 360 TABLET | Refills: 0 | Status: SHIPPED | OUTPATIENT
Start: 2021-03-02 | End: 2021-06-02

## 2021-04-30 ENCOUNTER — TELEPHONE (OUTPATIENT)
Dept: CARDIOLOGY CLINIC | Age: 81
End: 2021-04-30

## 2021-04-30 DIAGNOSIS — I10 BENIGN ESSENTIAL HTN: Primary | ICD-10-CM

## 2021-04-30 DIAGNOSIS — E78.2 HYPERLIPIDEMIA, MIXED: ICD-10-CM

## 2021-04-30 NOTE — TELEPHONE ENCOUNTER
Patient called stating she is on Repatha and usually gets labs done prior to visit with Dr. Lalita Arriaga. Patient is scheduled on 6.28.21 and would like a call regarding orders.

## 2021-05-31 DIAGNOSIS — M47.816 LUMBAR FACET ARTHROPATHY: ICD-10-CM

## 2021-05-31 DIAGNOSIS — M54.50 CHRONIC BILATERAL LOW BACK PAIN WITHOUT SCIATICA: ICD-10-CM

## 2021-05-31 DIAGNOSIS — M48.061 SPINAL STENOSIS OF LUMBAR REGION WITHOUT NEUROGENIC CLAUDICATION: ICD-10-CM

## 2021-05-31 DIAGNOSIS — G89.29 CHRONIC BILATERAL LOW BACK PAIN WITHOUT SCIATICA: ICD-10-CM

## 2021-05-31 DIAGNOSIS — M25.551 BILATERAL HIP PAIN: ICD-10-CM

## 2021-05-31 DIAGNOSIS — M25.552 BILATERAL HIP PAIN: ICD-10-CM

## 2021-06-01 RX ORDER — GABAPENTIN 300 MG/1
CAPSULE ORAL
Qty: 90 CAPSULE | Refills: 0 | Status: SHIPPED | OUTPATIENT
Start: 2021-06-01 | End: 2021-09-01

## 2021-06-02 RX ORDER — TRAMADOL HYDROCHLORIDE 50 MG/1
TABLET ORAL
Qty: 360 TABLET | Refills: 0 | Status: SHIPPED | OUTPATIENT
Start: 2021-06-02 | End: 2021-09-18

## 2021-06-14 ENCOUNTER — OFFICE VISIT (OUTPATIENT)
Dept: INTERNAL MEDICINE CLINIC | Age: 81
End: 2021-06-14
Payer: MEDICARE

## 2021-06-14 VITALS
BODY MASS INDEX: 34.15 KG/M2 | WEIGHT: 200 LBS | HEIGHT: 64 IN | SYSTOLIC BLOOD PRESSURE: 138 MMHG | HEART RATE: 73 BPM | DIASTOLIC BLOOD PRESSURE: 70 MMHG | RESPIRATION RATE: 12 BRPM

## 2021-06-14 DIAGNOSIS — M54.50 CHRONIC BILATERAL LOW BACK PAIN WITHOUT SCIATICA: ICD-10-CM

## 2021-06-14 DIAGNOSIS — Z13.6 SCREENING FOR CARDIOVASCULAR CONDITION: ICD-10-CM

## 2021-06-14 DIAGNOSIS — I25.10 CAD IN NATIVE ARTERY: ICD-10-CM

## 2021-06-14 DIAGNOSIS — M48.061 SPINAL STENOSIS OF LUMBAR REGION WITHOUT NEUROGENIC CLAUDICATION: ICD-10-CM

## 2021-06-14 DIAGNOSIS — Z00.00 ROUTINE GENERAL MEDICAL EXAMINATION AT A HEALTH CARE FACILITY: Primary | ICD-10-CM

## 2021-06-14 DIAGNOSIS — Z23 NEED FOR PROPHYLACTIC VACCINATION AND INOCULATION AGAINST VARICELLA: ICD-10-CM

## 2021-06-14 DIAGNOSIS — G89.29 CHRONIC BILATERAL LOW BACK PAIN WITHOUT SCIATICA: ICD-10-CM

## 2021-06-14 DIAGNOSIS — M47.816 LUMBAR FACET ARTHROPATHY: ICD-10-CM

## 2021-06-14 PROCEDURE — 4040F PNEUMOC VAC/ADMIN/RCVD: CPT | Performed by: INTERNAL MEDICINE

## 2021-06-14 PROCEDURE — G0446 INTENS BEHAVE THER CARDIO DX: HCPCS | Performed by: INTERNAL MEDICINE

## 2021-06-14 PROCEDURE — 1123F ACP DISCUSS/DSCN MKR DOCD: CPT | Performed by: INTERNAL MEDICINE

## 2021-06-14 PROCEDURE — G0439 PPPS, SUBSEQ VISIT: HCPCS | Performed by: INTERNAL MEDICINE

## 2021-06-14 ASSESSMENT — PATIENT HEALTH QUESTIONNAIRE - PHQ9
SUM OF ALL RESPONSES TO PHQ QUESTIONS 1-9: 1
1. LITTLE INTEREST OR PLEASURE IN DOING THINGS: 1
SUM OF ALL RESPONSES TO PHQ9 QUESTIONS 1 & 2: 1
SUM OF ALL RESPONSES TO PHQ QUESTIONS 1-9: 1
2. FEELING DOWN, DEPRESSED OR HOPELESS: 0
SUM OF ALL RESPONSES TO PHQ QUESTIONS 1-9: 1

## 2021-06-14 ASSESSMENT — LIFESTYLE VARIABLES: HOW OFTEN DO YOU HAVE A DRINK CONTAINING ALCOHOL: 0

## 2021-06-14 NOTE — PATIENT INSTRUCTIONS
Other Preventive Recommendations:    · A preventive eye exam performed by an eye specialist is recommended every 1-2 years to screen for glaucoma; cataracts, macular degeneration, and other eye disorders. · A preventive dental visit is recommended every 6 months. · Try to get at least 150 minutes of exercise per week or 10,000 steps per day on a pedometer . · Order or download the FREE \"Exercise & Physical Activity: Your Everyday Guide\" from [employer]. Call 5-604.889.5138 or search [employer] online. · You need 2101-6009 mg of calcium and 6388-9382 IU of vitamin D per day. It is possible to meet your calcium requirement with diet alone, but a vitamin D supplement is usually necessary to meet this goal.  · When exposed to the sun, use a sunscreen that protects against both UVA and UVB radiation with an SPF of 30 or greater. Reapply every 2 to 3 hours or after sweating, drying off with a towel, or swimming. · Always wear a seat belt when traveling in a car  · Contact [employer] to see if there are any resources that you could benefit from. Learning About Living Perroy  What is a living will? A living will, also called a declaration, is a legal form. It tells your family and your doctor your wishes when you can't speak for yourself. It's used by the health professionals who will treat you as you near the end of your life or if you get seriously hurt or ill. If you put your wishes in writing, your loved ones and others will know what kind of care you want. They won't need to guess. This can ease your mind and be helpful to others. And you can change or cancel your living will at any time. A living will is not the same as an estate or property will. An estate will explains what you want to happen with your money and property after you die. How do you use it?   A living will is used to describe the kinds of treatment or life support you want as you near the end of your life or if you get seriously hurt or ill. Keep these facts in mind about living hobbs. · Your living will is used only if you can't speak or make decisions for yourself. Most often, one or more doctors must certify that you can't speak or decide for yourself before your living will takes effect. · If you get better and can speak for yourself again, you can accept or refuse any treatment. It doesn't matter what you said in your living will. · Some states may limit your right to refuse treatment in certain cases. For example, you may need to clearly state in your living will that you don't want artificial hydration and nutrition, such as being fed through a tube. Is a living will a legal document? A living will is a legal document. Each state has its own laws about living hobbs. And a living will may be called something else in your state. Here are some things to know about living hobbs. · You don't need an  to complete a living will. But legal advice can be helpful if your state's laws are unclear. It can also help if your health history is complicated or your family can't agree on what should be in your living will. · You can change your living will at any time. Some people find that their wishes about end-of-life care change as their health changes. If you make big changes to your living will, complete a new form. · If you move to another state, make sure that your living will is legal in the state where you now live. In most cases, doctors will respect your wishes even if you have a form from a different state. · You might use a universal form that has been approved by many states. This kind of form can sometimes be filled out and stored online. Your digital copy will then be available wherever you have a connection to the internet. The doctors and nurses who need to treat you can find it right away. · Your state may offer an online registry.  This is another place where you can store your living will online. · It's a good idea to get your living will notarized. This means using a person called a  to watch two people sign, or witness, your living will. What should you know when you create a living will? Here are some questions to ask yourself as you make your living will:  · Do you know enough about life support methods that might be used? If not, talk to your doctor so you know what might be done if you can't breathe on your own, your heart stops, or you can't swallow. · What things would you still want to be able to do after you receive life-support methods? Would you want to be able to walk? To speak? To eat on your own? To live without the help of machines? · Do you want certain Tenriism practices performed if you become very ill? · If you have a choice, where do you want to be cared for? In your home? At a hospital or nursing home? · If you have a choice at the end of your life, where would you prefer to die? At home? In a hospital or nursing home? Somewhere else? · Would you prefer to be buried or cremated? · Do you want your organs to be donated after you die? What should you do with your living will? · Make sure that your family members and your health care agent have copies of your living will (also called a declaration). · Give your doctor a copy of your living will. Ask him or her to keep it as part of your medical record. If you have more than one doctor, make sure that each one has a copy. · Put a copy of your living will where it can be easily found. For example, some people may put a copy on their refrigerator door. If you are using a digital copy, be sure your doctor, family members, and health care agent know how to find and access it. Where can you learn more? Go to https://rosa.Softfront. org and sign in to your [employer] account. Enter U914 in the Transfercar box to learn more about \"Learning About Living Perroy. \" If you do not have an account, please click on the \"Sign Up Now\" link. Current as of: July 17, 2020               Content Version: 12.8  © 2006-2021 Healthwise, Incorporated. Care instructions adapted under license by [facility] ([facility]). If you have questions about a medical condition or this instruction, always ask your healthcare professional. [address] any warranty or liability for your use of this information. Personalized Preventive Plan for Alexi Stafford - 6/14/2021  Medicare offers a range of preventive health benefits. Some of the tests and screenings are paid in full while other may be subject to a deductible, co-insurance, and/or copay. Some of these benefits include a comprehensive review of your medical history including lifestyle, illnesses that may run in your family, and various assessments and screenings as appropriate. After reviewing your medical record and screening and assessments performed today your provider may have ordered immunizations, labs, imaging, and/or referrals for you. A list of these orders (if applicable) as well as your Preventive Care list are included within your After Visit Summary for your review.

## 2021-06-14 NOTE — PROGRESS NOTES
[facility]) Physicians  Internal Medicine  Patient Encounter  Arlene Fall D.O., [facility]       Medicare Annual Wellness Visit  Name: Elaina Sierra Date: 2021   MRN: [id number] Sex: Female   Age: [de-identified] y.o. Ethnicity: Non-/Non    : 1940 Race: Osvaldo Condon is here for Medicare AWV    Screenings for behavioral, psychosocial and functional/safety risks, and cognitive dysfunction are all negative except as indicated below. These results, as well as other patient data from the [employer] [address] form, are documented in Flowsheets linked to this Encounter.     Medical/Surgical Histories     Past Medical History:   Diagnosis Date    Acquired spondylolisthesis of lumbosacral region     Bilateral primary osteoarthritis of hip     CAD (coronary artery disease)     one stent in heart    Chronic bilateral low back pain without sciatica 2018    CKD (chronic kidney disease), stage III (HCC)     DDD (degenerative disc disease), lumbar     Dr. Sukumar Chavez GERD (gastroesophageal reflux disease)     Headache(784.0)     Heel spur 2016    History of lumbar spinal fusion     Hyperlipidemia     Hypertension     IBS (irritable bowel syndrome)     Lyme disease     suspected    Medical history reviewed with no changes     MVP (mitral valve prolapse)     Neuroma nos     Osteoarthritis     Osteopenia     Post-laminectomy syndrome     Rotator cuff tear     Dr. Junior Hinton    Squamous cell cancer of skin of right hand 2018    squamous cell carcinoma    Stress incontinence, female     Urinary incontinence         Past Surgical History:   Procedure Laterality Date    BREAST SURGERY      cyst removal    COLONOSCOPY  2006    CORONARY ANGIOPLASTY WITH STENT PLACEMENT      x1    HC INJECTION PROCEDURE FOR SACROILIAC JOINT Bilateral 2020    BILATERAL SACROILIAC JOINT INJECTION WITH FLUOROSCOPY performed by Jarek Joseph Gisela Macedo MD at [facility]  5/19/14    LUMBAR 4-5 DECOMPRESSIVE LAMINECTOMY AND POSTERIOR LATERAL FUSION LUMBAR 4-5 AND PEDICLE SCREW FIXATION     LAMINECTOMY POSTERIOR THORACIC / LUMBAR Right 4/9/2019    THORACIC LAMINECTOMY WITH PADDLE LEAD AND RECHARGEBLE BATTERY IN RIGHT HIP performed by Mely Villegas MD at [facility]  10/02/2019    R nasal sidewall    AR ARTHROCENTESIS ASPIR&/INJ INTERM JT/BURS W/O US N/A 10/24/2018    MIDLINE CAUDEL INJECTION WITH FLUOROSCOPY performed by Angi Olivas MD at [facility]  2002    stent, LAD    SKIN CANCER EXCISION Right 05/24/2018    Right dorsal hand, Dr. Macho Weaver N/A 3/11/2019    SPINAL CORD STIMULATOR IMPLANT TRIAL WITH FLUOROSCOPY WITH NEVRO performed by Angi Olivas MD at [address] [facility]      age 10           Medications/Allergies     Current Outpatient Medications   Medication Sig Dispense Refill    zoster recombinant adjuvanted vaccine [facility]) 50 MCG/0.5ML SUSR injection Inject 0.5 mLs into the muscle once for 1 dose . Repeat in 2 to 6 months 0.5 mL 1    Evolocumab 140 MG/ML SOAJ Inject 140 mLs into the skin every 30 days 140 mL 5    traMADol (ULTRAM) 50 MG tablet TAKE 1 TABLET EVERY 6 HOURS AS NEEDED FOR PAIN 360 tablet 0    gabapentin (NEURONTIN) 300 MG capsule TAKE 1 CAPSULE BY MOUTH EVERY NIGHT 90 capsule 0    irbesartan (AVAPRO) 300 MG tablet TAKE 1 TABLET DAILY 90 tablet 3    hydroCHLOROthiazide (MICROZIDE) 12.5 MG capsule TAKE 1 CAPSULE DAILY 90 capsule 3    bisoprolol-hydrochlorothiazide (ZIAC) 10-6.25 MG per tablet TAKE 1 TABLET DAILY 90 tablet 3    vitamin E 400 UNIT capsule Take 400 Units by mouth nightly      aspirin 81 MG tablet Take 81 mg by mouth daily      Multiple Vitamins-Minerals (THERAPEUTIC MULTIVITAMIN-MINERALS) tablet Take 1 tablet by mouth daily      melatonin (RA MELATONIN) 3 MG TABS tablet Take 1 tablet by mouth nightly.   3    Calcium-Vitamin D (CALTRATE 600 PLUS-VIT D PO) Take  by mouth.  Misc Natural Products (OSTEO BI-FLEX TRIPLE STRENGTH PO) Take 1 tablet by mouth 2 times daily       acetaminophen (TYLENOL) 325 MG tablet Take 650 mg by mouth every 6 hours as needed for Pain       No current facility-administered medications for this visit. Allergies   Allergen Reactions    Aquaphor [Aquaphilic]     Other     Statins Other (See Comments)     Severe muscle pain    Adhesive Tape Itching and Rash     Pt Okay with paper tape         Substance Use History     Social History     Tobacco Use    Smoking status: Never Smoker    Smokeless tobacco: Never Used   Vaping Use    Vaping Use: Never used   Substance Use Topics    Alcohol use: No    Drug use: Never          Family History     Family History   Problem Relation Age of Onset    Diabetes Brother     Diabetes Mother     Heart Disease Mother     Heart Disease Father     Cancer Father         skin             CareTeam (Including outside providers/suppliers regularly involved in providing care):   Patient Care Team:  Allen Oates DO as PCP - General (Internal Medicine)  Allen Oates DO as PCP - REHABILITATION [facility] Empaneled Provider  John Paul Kenny MD as Consulting Physician (Nephrology)  Isma Bui MD as Consulting Physician (Pain Management)      Based upon direct observation of the patient, evaluation of cognition reveals recent and remote memory intact. ROS:  GEN:  Gaining weight. MS:  [facility] 10/5/2021for left knee pain. She was referred to Dr. Idris Adams. She was diagnosed with Medial meniscal tear. She had an MRI--   Impression   Degenerative signal within the posterior horn of the medial meniscus with   horizontal tear at the horn body junction.  Tear communicates with the   inferior articular surface.  Mild extrusion of the body.       Small joint effusion.       Grade 2-3 chondral fissuring along the patella and lateral compartment     PSYCH: Occasionally feels down, but getting better. She declines need for counselor. MS:  Chronic low back pain. S/P Spinal Cord Stimulator, 3/11/2019. Has seen Dr. Eve Ha. Last summer had SI joint injections. Patient is not in favor of returning. Physical Exam    Vitals:    06/14/21 1409 06/14/21 1448 06/14/21 1454   BP: (!) 170/100 (!) 140/70 138/70   Pulse: 73     Resp: 12     Weight: 200 lb (90.7 kg)     Height: 5' 4\" (1.626 m)       Body mass index is 34.33 kg/m². Wt Readings from Last 3 Encounters:   06/14/21 200 lb (90.7 kg)   12/17/20 199 lb 9.6 oz (90.5 kg)   10/26/20 196 lb (88.9 kg)     BP Readings from Last 3 Encounters:   06/14/21 138/70   12/17/20 136/76   10/05/20 (!) 149/77      Up 10 pounds since 2/2020      GEN:  [de-identified] y.o. female who is in NAD, A&O. She appears stated age and well nourished. She is cooperative and pleasant. EARS:  EAC's clear, TM's normal.  NECK:  Supple. Full ROM. Trachea is midline. No increased JVD. No thyromegaly or nodules. No masses  CARDIAC:  S1S2 NL. Regular rhythm. No murmur/clicks/rubs. No ectopy. PMI is non-displaced. VASC:  Pedal pulses 2/4. Carotid upstrokes 2+. No bruits noted. PULM:  Lungs are CTA. Symmetric breath sounds noted. AP Diameter NL. GI:  Abdomen is soft and nontender. No distension. No organomegaly. No masses. No pulsatile masses. EXT:  No Cyanosis or clubbing. No edema. SKIN: Warm and dry, normal turgor, no rash or lesions of concern. NEURO:  Cranial nerves 2-12 are NL. Speech fluent and coherent. Strength is 5/5 in all muscle groups. No sensory deficits. No focal or lateralizing deficits. Reflexes 2/4 and symmetric. Gait is normal.  MS:  No C/T/L paraspinal tenderness. No scoliosis. No joint effusions. Full joint ROM. PSYCH:  Mood and affect NL. Judgement and insight NL. Patient's complete Health Risk Assessment and screening values have been reviewed and are found in Flowsheets.  The following Preparation  ADL Interventions:  · Referred to [place] on Aging    Personalized Preventive Plan   Current Health Maintenance Status  Immunization History   Administered Date(s) Administered    COVID-19, Felix Peter, PF, 30mcg/0.3mL 01/22/2021, 02/12/2021    Influenza Vaccine, unspecified formulation 10/10/2016    Influenza Virus Vaccine 11/04/2011, 09/17/2014, 10/10/2016    Influenza Whole 09/24/2010    Influenza, High Dose (Fluzone 65 yrs and older) 09/25/2012, 09/18/2013, 10/22/2015, 10/06/2017, 11/16/2018    Influenza, Triv, inactivated, subunit, adjuvanted, IM (Fluad 65 yrs and older) 10/28/2019    Pneumococcal Conjugate 13-valent ([id number]) 07/22/2015    Pneumococcal Polysaccharide ([id number]) 09/10/2010    Tdap (Boostrix, Adacel) 11/03/2016    Zoster Live (Zostavax) 05/13/2013        Health Maintenance   Topic Date Due    Shingles Vaccine (2 of 3) 07/08/2013    Annual Wellness Visit (AWV)  Never done    Potassium monitoring  12/11/2021    Creatinine monitoring  12/11/2021    DTaP/Tdap/Td vaccine (2 - Td or Tdap) 11/03/2026    DEXA (modify frequency per FRAX score)  Completed    Flu vaccine  Completed    Pneumococcal 65+ years Vaccine  Completed    COVID-19 Vaccine  Completed    Hepatitis A vaccine  Aged Out    Hepatitis B vaccine  Aged Out    Hib vaccine  Aged Out    Meningococcal (ACWY) vaccine  Aged Out     Recommendations for [facility] Records Due: see orders and patient instructions/AVS.  . Recommended screening schedule for the next 5-10 years is provided to the patient in written form: see Patient Santosh Mcgrath was seen today for medicare awv. Diagnoses and all orders for this visit:    Routine general medical examination at a health care facility  -     zoster recombinant adjuvanted vaccine [facility]) 50 MCG/0.5ML SUSR injection; Inject 0.5 mLs into the muscle once for 1 dose .   Repeat in 2 to 6 months  -     SC Intens behave ther cardio dx, 15 minutes []    Screening for cardiovascular condition  -     OK Intens behave ther cardio dx, 15 minutes []    Need for prophylactic vaccination and inoculation against varicella  -     zoster recombinant adjuvanted vaccine (SHINGRIX) 50 MCG/0.5ML SUSR injection; Inject 0.5 mLs into the muscle once for 1 dose . Repeat in 2 to 6 months    Chronic bilateral low back pain without sciatica  -     Ambulatory referral to Physical Therapy    Spinal stenosis of lumbar region without neurogenic claudication  -     Ambulatory referral to Physical Therapy    Lumbar facet arthropathy  -     Ambulatory referral to Physical Therapy    CAD in native artery                 Cardiovascular Disease Risk Counseling: Assessed the patient's risk to develop cardiovascular disease and reviewed main risk factors. Reviewed steps to reduce disease risk including:   · Quitting tobacco use, reducing amount smoked, or not starting the habit  · Making healthy food choices  · Being physically active and gradualy increasing activity levels   · Reduce weight and determine a healthy BMI goal  · Monitor blood pressure and treat if higher than 140/90 mmHg  · Maintain blood total cholesterol levels under 5 mmol/l or 190 mg/dl  · Maintain LDL cholesterol levels under 3.0 mmol/l or 115 mg/dl   · Control blood glucose levels  · Consider taking aspirin (75 mg daily), once blood pressure is controlled   Provided a follow up plan.   Time spent (minutes): 15 minutes

## 2021-07-15 ENCOUNTER — HOSPITAL ENCOUNTER (OUTPATIENT)
Age: 81
Discharge: HOME | End: 2021-07-15
Payer: MEDICARE

## 2021-07-15 DIAGNOSIS — E78.2 HYPERLIPIDEMIA, MIXED: ICD-10-CM

## 2021-07-15 DIAGNOSIS — I10 BENIGN ESSENTIAL HTN: ICD-10-CM

## 2021-07-15 LAB
A/G RATIO: 1.3 (ref 1.1–2.2)
ALBUMIN SERPL-MCNC: 4.1 G/DL (ref 3.4–5)
ALP BLD-CCNC: 70 U/L (ref 40–129)
ALT SERPL-CCNC: 16 U/L (ref 10–40)
ANION GAP SERPL CALCULATED.3IONS-SCNC: 8 MMOL/L (ref 3–16)
AST SERPL-CCNC: 27 U/L (ref 15–37)
BILIRUB SERPL-MCNC: 0.4 MG/DL (ref 0–1)
BUN BLDV-MCNC: 26 MG/DL (ref 7–20)
CALCIUM SERPL-MCNC: 10.2 MG/DL (ref 8.3–10.6)
CHLORIDE BLD-SCNC: 97 MMOL/L (ref 99–110)
CHOLESTEROL, TOTAL: 188 MG/DL (ref 0–199)
CO2: 31 MMOL/L (ref 21–32)
CREAT SERPL-MCNC: 1.2 MG/DL (ref 0.6–1.2)
GFR AFRICAN AMERICAN: 52
GFR NON-AFRICAN AMERICAN: 43
GLOBULIN: 3.2 G/DL
GLUCOSE BLD-MCNC: 100 MG/DL (ref 70–99)
HCT VFR BLD CALC: 45.4 % (ref 36–48)
HDLC SERPL-MCNC: 46 MG/DL (ref 40–60)
HEMOGLOBIN: 15.2 G/DL (ref 12–16)
LDL CHOLESTEROL CALCULATED: 89 MG/DL
MCH RBC QN AUTO: 33.1 PG (ref 26–34)
MCHC RBC AUTO-ENTMCNC: 33.5 G/DL (ref 31–36)
MCV RBC AUTO: 98.8 FL (ref 80–100)
PDW BLD-RTO: 14.2 % (ref 12.4–15.4)
PLATELET # BLD: 187 K/UL (ref 135–450)
PMV BLD AUTO: 10.1 FL (ref 5–10.5)
POTASSIUM SERPL-SCNC: 4.1 MMOL/L (ref 3.5–5.1)
RBC # BLD: 4.6 M/UL (ref 4–5.2)
SODIUM BLD-SCNC: 136 MMOL/L (ref 136–145)
TOTAL PROTEIN: 7.3 G/DL (ref 6.4–8.2)
TRIGL SERPL-MCNC: 265 MG/DL (ref 0–150)
VLDLC SERPL CALC-MCNC: 53 MG/DL
WBC # BLD: 5 K/UL (ref 4–11)

## 2021-07-15 PROCEDURE — 80061 LIPID PANEL: CPT

## 2021-07-15 PROCEDURE — 85027 COMPLETE CBC AUTOMATED: CPT

## 2021-07-15 PROCEDURE — 36415 COLL VENOUS BLD VENIPUNCTURE: CPT

## 2021-07-15 PROCEDURE — 80053 COMPREHEN METABOLIC PANEL: CPT

## 2021-07-22 ENCOUNTER — OFFICE VISIT (OUTPATIENT)
Dept: CARDIOLOGY CLINIC | Age: 81
End: 2021-07-22
Payer: MEDICARE

## 2021-07-22 VITALS
SYSTOLIC BLOOD PRESSURE: 124 MMHG | HEART RATE: 70 BPM | WEIGHT: 199.2 LBS | DIASTOLIC BLOOD PRESSURE: 80 MMHG | BODY MASS INDEX: 34.19 KG/M2

## 2021-07-22 DIAGNOSIS — E78.2 HYPERLIPIDEMIA, MIXED: ICD-10-CM

## 2021-07-22 DIAGNOSIS — Z78.9 STATIN INTOLERANCE: ICD-10-CM

## 2021-07-22 DIAGNOSIS — R06.02 SOB (SHORTNESS OF BREATH): ICD-10-CM

## 2021-07-22 DIAGNOSIS — I25.10 CAD IN NATIVE ARTERY: ICD-10-CM

## 2021-07-22 DIAGNOSIS — I10 BENIGN ESSENTIAL HTN: ICD-10-CM

## 2021-07-22 PROCEDURE — 1036F TOBACCO NON-USER: CPT | Performed by: INTERNAL MEDICINE

## 2021-07-22 PROCEDURE — 1123F ACP DISCUSS/DSCN MKR DOCD: CPT | Performed by: INTERNAL MEDICINE

## 2021-07-22 PROCEDURE — G8399 PT W/DXA RESULTS DOCUMENT: HCPCS | Performed by: INTERNAL MEDICINE

## 2021-07-22 PROCEDURE — 99214 OFFICE O/P EST MOD 30 MIN: CPT | Performed by: INTERNAL MEDICINE

## 2021-07-22 PROCEDURE — G8427 DOCREV CUR MEDS BY ELIG CLIN: HCPCS | Performed by: INTERNAL MEDICINE

## 2021-07-22 PROCEDURE — 1090F PRES/ABSN URINE INCON ASSESS: CPT | Performed by: INTERNAL MEDICINE

## 2021-07-22 PROCEDURE — 4040F PNEUMOC VAC/ADMIN/RCVD: CPT | Performed by: INTERNAL MEDICINE

## 2021-07-22 PROCEDURE — G8417 CALC BMI ABV UP PARAM F/U: HCPCS | Performed by: INTERNAL MEDICINE

## 2021-07-22 ASSESSMENT — ENCOUNTER SYMPTOMS
ABDOMINAL PAIN: 0
BACK PAIN: 0
SHORTNESS OF BREATH: 1
VOMITING: 0
BLOOD IN STOOL: 0
EYE DISCHARGE: 0
COUGH: 0
FACIAL SWELLING: 0
ABDOMINAL DISTENTION: 0
COLOR CHANGE: 0
CHEST TIGHTNESS: 0
WHEEZING: 0

## 2021-07-22 NOTE — PROGRESS NOTES
[facility]     Outpatient Cardiology         Chief Complaint   Patient presents with    1 Year Follow Up     cad       HPI      Nonda Bowens a [de-identified] y.o. female here for follow up for CAD/HTN/HLD. CAD,   Hypertension, well-controlled current medications. No changes. Hyperlipidemia, tolerating well PCSK9, LDL at goal.  Her shortness of breath is a little bit better, she does have some deconditioning, now starting some physical therapy. Having some knee pain which limits the amount of workout that she can do.       PMH  Past Medical History:   Diagnosis Date    Acquired spondylolisthesis of lumbosacral region     Bilateral primary osteoarthritis of hip     CAD (coronary artery disease)     one stent in heart    Chronic bilateral low back pain without sciatica 2/8/2018    CKD (chronic kidney disease), stage III (HCC)     DDD (degenerative disc disease), lumbar     Dr. Granados Notice     Dr. Patsy Rhodes GERD (gastroesophageal reflux disease)     Headache(784.0)     Heel spur 08/2016    History of lumbar spinal fusion     Hyperlipidemia     Hypertension     IBS (irritable bowel syndrome)     Lyme disease 2009    suspected    Medical history reviewed with no changes     MVP (mitral valve prolapse)     Neuroma nos     Osteoarthritis     Osteopenia     Post-laminectomy syndrome     Rotator cuff tear     Dr. Shannan Gardner    Squamous cell cancer of skin of right hand 05/2018    squamous cell carcinoma    Stress incontinence, female     Urinary incontinence        PSH  Past Surgical History:   Procedure Laterality Date    BREAST SURGERY      cyst removal    COLONOSCOPY  2006    CORONARY ANGIOPLASTY WITH STENT PLACEMENT      x1    HC INJECTION PROCEDURE FOR SACROILIAC JOINT Bilateral 7/17/2020    BILATERAL SACROILIAC JOINT INJECTION WITH FLUOROSCOPY performed by Kenny Bui MD at [address] [place] [address]  5/19/14    LUMBAR 4-5 DECOMPRESSIVE LAMINECTOMY AND POSTERIOR LATERAL FUSION LUMBAR 4-5 AND PEDICLE SCREW FIXATION     LAMINECTOMY POSTERIOR THORACIC / LUMBAR Right 4/9/2019    THORACIC LAMINECTOMY WITH PADDLE LEAD AND RECHARGEBLE BATTERY IN RIGHT HIP performed by Carmelina Katz MD at [facility]  10/02/2019    R nasal sidewall    OK ARTHROCENTESIS ASPIR&/INJ INTERM JT/BURS W/O US N/A 10/24/2018    MIDLINE CAUDEL INJECTION WITH FLUOROSCOPY performed by Sowmya Ramachandran MD at [address] [facility]  2002    stent, LAD    SKIN CANCER EXCISION Right 05/24/2018    Right dorsal hand, Dr. Randell Rosa N/A 3/11/2019    SPINAL CORD STIMULATOR IMPLANT TRIAL WITH FLUOROSCOPY WITH NEVRO performed by Sowmya Ramachandran MD at [address] [facility]      age 10        Social HIstory  Social History     Tobacco Use    Smoking status: Never Smoker    Smokeless tobacco: Never Used   Vaping Use    Vaping Use: Never used   Substance Use Topics    Alcohol use: No    Drug use: Never       Family History  Family History   Problem Relation Age of Onset    Diabetes Brother     Diabetes Mother     Heart Disease Mother     Heart Disease Father     Cancer Father         skin       Allergies   Allergies   Allergen Reactions    Aquaphor [Aquaphilic]     Other     Statins Other (See Comments)     Severe muscle pain    Adhesive Tape Itching and Rash     Pt Okay with paper tape       Medications:     Home Medications:  Were reviewed and are listed in nursing record. and/or listed below    Prior to Admission medications    Medication Sig Start Date End Date Taking?  Authorizing Provider   Evolocumab 140 MG/ML SOAJ Inject 140 mLs into the skin every 30 days 6/10/21  Yes Idalia Ag MD   traMADol (ULTRAM) 50 MG tablet TAKE 1 TABLET EVERY 6 HOURS AS NEEDED FOR PAIN 6/2/21 8/31/21 Yes Hill James,    gabapentin (NEURONTIN) 300 MG capsule TAKE 1 CAPSULE BY MOUTH EVERY NIGHT 6/1/21 8/30/21 Yes [facility], DO irbesartan (AVAPRO) 300 MG tablet TAKE 1 TABLET DAILY 8/17/20  Yes Kiley Titus MD   hydroCHLOROthiazide (MICROZIDE) 12.5 MG capsule TAKE 1 CAPSULE DAILY 8/17/20  Yes Hill James DO   bisoprolol-hydrochlorothiazide (ZIAC) 10-6.25 MG per tablet TAKE 1 TABLET DAILY 8/17/20  Yes Hill James DO   vitamin E 400 UNIT capsule Take 400 Units by mouth nightly   Yes Historical Provider, MD   aspirin 81 MG tablet Take 81 mg by mouth daily   Yes Historical Provider, MD   Multiple Vitamins-Minerals (THERAPEUTIC MULTIVITAMIN-MINERALS) tablet Take 1 tablet by mouth daily   Yes Historical Provider, MD   acetaminophen (TYLENOL) 325 MG tablet Take 650 mg by mouth every 6 hours as needed for Pain   Yes Historical Provider, MD   melatonin (RA MELATONIN) 3 MG TABS tablet Take 1 tablet by mouth nightly. 9/17/14  Yes Hill James DO   Calcium-Vitamin D (CALTRATE 600 PLUS-VIT D PO) Take  by mouth. Yes Historical Provider, MD   Misc Natural Products (OSTEO BI-FLEX TRIPLE STRENGTH PO) Take 1 tablet by mouth 2 times daily    Yes Historical Provider, MD        Review of Systems   Constitutional: Negative for activity change, appetite change, diaphoresis, fatigue, fever and unexpected weight change. HENT: Negative for congestion, facial swelling, mouth sores and nosebleeds. Eyes: Negative for discharge and visual disturbance. Respiratory: Positive for shortness of breath. Negative for cough, chest tightness and wheezing. Cardiovascular: Negative for chest pain, palpitations and leg swelling. Gastrointestinal: Negative for abdominal distention, abdominal pain, blood in stool and vomiting. Endocrine: Negative for cold intolerance, heat intolerance and polyuria. Genitourinary: Negative for difficulty urinating, dysuria, frequency and hematuria. Musculoskeletal: Negative for back pain, joint swelling, myalgias and neck pain. Skin: Negative for color change, pallor and rash.    Allergic/Immunologic: Negative for immunocompromised state. Neurological: Negative for dizziness, syncope, weakness, light-headedness, numbness and headaches. Hematological: Negative for adenopathy. Does not bruise/bleed easily. Psychiatric/Behavioral: Negative for behavioral problems, confusion, decreased concentration and suicidal ideas. The patient is not nervous/anxious. Vitals:    07/22/21 1349   BP: 124/80   Pulse: 70    Weight: 199 lb 3.2 oz (90.4 kg)       Vitals:    07/22/21 1349   BP: 124/80   Pulse: 70   Weight: 199 lb 3.2 oz (90.4 kg)       BP Readings from Last 3 Encounters:   07/22/21 124/80   06/14/21 138/70   12/17/20 136/76       Wt Readings from Last 3 Encounters:   07/22/21 199 lb 3.2 oz (90.4 kg)   06/14/21 200 lb (90.7 kg)   12/17/20 199 lb 9.6 oz (90.5 kg)       Physical Exam  Constitutional:       General: She is not in acute distress. Appearance: She is well-developed. She is not diaphoretic. HENT:      Head: Normocephalic and atraumatic. Eyes:      Pupils: Pupils are equal, round, and reactive to light. Neck:      Thyroid: No thyromegaly. Vascular: No JVD. Cardiovascular:      Rate and Rhythm: Normal rate and regular rhythm. Chest Wall: PMI is not displaced. Heart sounds: Normal heart sounds, S1 normal and S2 normal. No murmur heard. No friction rub. No gallop. Pulmonary:      Effort: Pulmonary effort is normal. No respiratory distress. Breath sounds: Normal breath sounds. No stridor. No wheezing or rales. Chest:      Chest wall: No tenderness. Abdominal:      General: Bowel sounds are normal. There is no distension. Palpations: Abdomen is soft. Tenderness: There is no abdominal tenderness. There is no guarding or rebound. Musculoskeletal:         General: No tenderness. Normal range of motion. Cervical back: Normal range of motion. Lymphadenopathy:      Cervical: No cervical adenopathy. Skin:     General: Skin is warm and dry.       Findings: No erythema or rash. Neurological:      Mental Status: She is alert and oriented to person, place, and time. Coordination: Coordination normal.   Psychiatric:         Behavior: Behavior normal.         Thought Content: Thought content normal.         Judgment: Judgment normal.         Labs:       Lab Results   Component Value Date    WBC 5.0 07/15/2021    HGB 15.2 07/15/2021    HCT 45.4 07/15/2021    MCV 98.8 07/15/2021     07/15/2021     Lab Results   Component Value Date     07/15/2021    K 4.1 07/15/2021    CL 97 (L) 07/15/2021    CO2 31 07/15/2021    BUN 26 (H) 07/15/2021    CREATININE 1.2 07/15/2021    GLUCOSE 100 (H) 07/15/2021    CALCIUM 10.2 07/15/2021    PROT 7.3 07/15/2021    LABALBU 4.1 07/15/2021    BILITOT 0.4 07/15/2021    ALKPHOS 70 07/15/2021    AST 27 07/15/2021    ALT 16 07/15/2021    LABGLOM 43 (A) 07/15/2021    GFRAA 52 (A) 07/15/2021    AGRATIO 1.3 07/15/2021    GLOB 3.2 07/15/2021         Lab Results   Component Value Date    CHOL 188 07/15/2021    CHOL 212 (H) 12/11/2020    CHOL 204 (H) 09/15/2020     Lab Results   Component Value Date    TRIG 265 (H) 07/15/2021    TRIG 152 (H) 12/11/2020    TRIG 304 (H) 09/15/2020     Lab Results   Component Value Date    HDL 46 07/15/2021    HDL 64 (H) 12/11/2020    HDL 48 09/15/2020     Lab Results   Component Value Date    LDLCALC 89 07/15/2021    LDLCALC 118 (H) 12/11/2020    LDLCALC see below 09/15/2020     Lab Results   Component Value Date    LABVLDL 53 07/15/2021    LABVLDL 30 12/11/2020    LABVLDL see below 09/15/2020     No results found for: [facility]    Lab Results   Component Value Date    INR 0.99 04/09/2019    INR 0.90 03/11/2019    INR 0.89 05/13/2014    PROTIME 11.3 04/09/2019    PROTIME 10.3 03/11/2019    PROTIME 10.0 05/13/2014       The ASCVD Risk score (Anushka Parada, et al., 2013) failed to calculate for the following reasons:     The 2013 ASCVD risk score is only valid for ages 36 to 78      Imaging:       Last ECG (if available):  NSR, old ant MI, IVCD    Last Stress (if available): 3/19/20   Summary    There is normal isotope uptake at stress and rest. There is no evidence of    myocardial ischemia or scar.    Left ventricular ejection fraction of 71 %.    Overall findings represent a low risk scan. 12/8/14   Summary    Left ventricular ejection fraction of 74 %.    The LV wall motion is normal.    There is normal isotope uptake at stress and rest. There is no evidence of    myocardial ischemia or scar. Last Cath (if available):    Last TTE/ALANNAH(if available): 4/4/19   Summary   Left ventricular cavity size is normal. There is moderate concentric left   ventricular hypertrophy. Overall left ventricular systolic function appears   low normal with an ejection fraction of 50-55%. Abnormal septal motion is   present. Normal diastolic function. Mild tricuspid regurgitation. Estimated   pulmonary artery systolic pressure is at 32 mmHg assuming a right atrial   pressure of 3 mmHg. No prior echo for comparison. Last CMR  (if available):      Assessment / Plan:     CAD in native artery  Continue risk factor modification    Benign essential HTN  Well-controlled current medications    Statin intolerance  Tolerating well PCSK9    SOB (shortness of breath)  Due to the condition. Continue physical therapy. Encouraged loose weight    Hyperlipidemia, mixed  Doing well and LDL at goal with PCSK9. Follow up in 12 months. I had the opportunity to review the clinical symptoms and presentation of [place]. Patient's allergies and medications were reviewed and updated. Patient's past medical, surgical, social and family history were reviewed and updated. Patient's testing including laboratory, ECGs, monitor, imaging (TTE,ALANNAH,CMR,cath) were reviewed. All questions and concerns were addressed to the patient/family. Alternatives to my treatment were discussed. The note was completed using EMR.  Every effort was made to ensure accuracy; however, inadvertent computerized transcription errors may be present. Thank you for allowing me to participate in the care or [address]  Love Carvajal MD, [facility] [place], [place]

## 2021-08-06 ENCOUNTER — OFFICE VISIT (OUTPATIENT)
Dept: ORTHOPEDIC SURGERY | Age: 81
End: 2021-08-06
Payer: MEDICARE

## 2021-08-06 VITALS — HEIGHT: 64 IN | BODY MASS INDEX: 34.04 KG/M2 | WEIGHT: 199.4 LBS

## 2021-08-06 DIAGNOSIS — S83.242D TEAR OF MEDIAL MENISCUS OF LEFT KNEE, CURRENT, UNSPECIFIED TEAR TYPE, SUBSEQUENT ENCOUNTER: Primary | ICD-10-CM

## 2021-08-06 DIAGNOSIS — M17.12 PRIMARY OSTEOARTHRITIS OF LEFT KNEE: ICD-10-CM

## 2021-08-06 PROCEDURE — G8417 CALC BMI ABV UP PARAM F/U: HCPCS | Performed by: PHYSICIAN ASSISTANT

## 2021-08-06 PROCEDURE — 4040F PNEUMOC VAC/ADMIN/RCVD: CPT | Performed by: PHYSICIAN ASSISTANT

## 2021-08-06 PROCEDURE — 1123F ACP DISCUSS/DSCN MKR DOCD: CPT | Performed by: PHYSICIAN ASSISTANT

## 2021-08-06 PROCEDURE — G8399 PT W/DXA RESULTS DOCUMENT: HCPCS | Performed by: PHYSICIAN ASSISTANT

## 2021-08-06 PROCEDURE — 1090F PRES/ABSN URINE INCON ASSESS: CPT | Performed by: PHYSICIAN ASSISTANT

## 2021-08-06 PROCEDURE — G8427 DOCREV CUR MEDS BY ELIG CLIN: HCPCS | Performed by: PHYSICIAN ASSISTANT

## 2021-08-06 PROCEDURE — 1036F TOBACCO NON-USER: CPT | Performed by: PHYSICIAN ASSISTANT

## 2021-08-06 PROCEDURE — 20610 DRAIN/INJ JOINT/BURSA W/O US: CPT | Performed by: PHYSICIAN ASSISTANT

## 2021-08-06 PROCEDURE — 99213 OFFICE O/P EST LOW 20 MIN: CPT | Performed by: PHYSICIAN ASSISTANT

## 2021-08-06 RX ORDER — LIDOCAINE HYDROCHLORIDE 10 MG/ML
5 INJECTION, SOLUTION INFILTRATION; PERINEURAL ONCE
Status: COMPLETED | OUTPATIENT
Start: 2021-08-06 | End: 2021-08-06

## 2021-08-06 RX ORDER — BETAMETHASONE SODIUM PHOSPHATE AND BETAMETHASONE ACETATE 3; 3 MG/ML; MG/ML
12 INJECTION, SUSPENSION INTRA-ARTICULAR; INTRALESIONAL; INTRAMUSCULAR; SOFT TISSUE ONCE
Status: COMPLETED | OUTPATIENT
Start: 2021-08-06 | End: 2021-08-06

## 2021-08-06 RX ADMIN — BETAMETHASONE SODIUM PHOSPHATE AND BETAMETHASONE ACETATE 12 MG: 3; 3 INJECTION, SUSPENSION INTRA-ARTICULAR; INTRALESIONAL; INTRAMUSCULAR; SOFT TISSUE at 10:47

## 2021-08-06 RX ADMIN — LIDOCAINE HYDROCHLORIDE 5 ML: 10 INJECTION, SOLUTION INFILTRATION; PERINEURAL at 10:47

## 2021-08-06 NOTE — PROGRESS NOTES
injection 12 mg    lidocaine 1 % injection 5 mL   2. Primary osteoarthritis of left knee  10650 - OK DRAIN/INJECT LARGE JOINT/BURSA    betamethasone acetate-betamethasone sodium phosphate (CELESTONE) injection 12 mg    lidocaine 1 % injection 5 mL       Injection:  After discussing the risks and benefits of cortisone injection including increased pain, drug reaction, infection, bleeding, blood glucose elevation, lack of improvement and neurovascular injury she agreed to receive one today. Questions were encouraged and answered. The correct patient, procedure, site and side were identified. The left knee was prepped with Betadine and 2 mL of betamethasone (12mg) mixed with 5 mL 1% lidocaine plain (50mg) were instilled with careful aspiration and injection under aseptic technique. The skin was then cleaned again with alcohol and a sterile adhesive dressing was applied. She tolerated this well and was instructed regarding post-injection care of icing and decreased activity as necessary. Treatment Plan:    Patient has an acute exacerbation of her chronic left knee pain. She has had no recent specific injury. She has been doing physical therapy and feels pain is improving. She has known arthritis with meniscal tearing. We discussed other conservative treatment options and she elected to proceed with cortisone injection. This was completed as recorded above in the procedure note. She is given postinjection precautions. She will continue with physical therapy as well as continue with her home exercises and stretches. She is advised to continue resting, icing and elevating the left knee as needed. She will follow up in 4 to 6 weeks or before that time with any concerns. Elizabeth Richards was informed of the results of any imaging. We discussed treatment options and a time was given to answer questions.  A plan was proposed and Elizabeth Richards understand and accepts this course of care.          Electronically signed by Yousif Abdul PA-C on 8/47/9525  Board Certified [facility]    Please note that portions of this note were completed with a voice recognition program.  Efforts were made to edit the dictations but occasionally words are mis-transcribed.

## 2021-09-01 RX ORDER — GABAPENTIN 300 MG/1
CAPSULE ORAL
Qty: 90 CAPSULE | Refills: 0 | Status: SHIPPED | OUTPATIENT
Start: 2021-09-01 | End: 2021-11-23

## 2021-09-01 RX ORDER — GABAPENTIN 300 MG/1
CAPSULE ORAL
Qty: 90 CAPSULE | Refills: 0 | Status: CANCELLED | OUTPATIENT
Start: 2021-09-01

## 2021-09-16 DIAGNOSIS — M25.552 BILATERAL HIP PAIN: ICD-10-CM

## 2021-09-16 DIAGNOSIS — M47.816 LUMBAR FACET ARTHROPATHY: ICD-10-CM

## 2021-09-16 DIAGNOSIS — M25.551 BILATERAL HIP PAIN: ICD-10-CM

## 2021-09-16 DIAGNOSIS — M54.50 CHRONIC BILATERAL LOW BACK PAIN WITHOUT SCIATICA: ICD-10-CM

## 2021-09-16 DIAGNOSIS — G89.29 CHRONIC BILATERAL LOW BACK PAIN WITHOUT SCIATICA: ICD-10-CM

## 2021-09-16 DIAGNOSIS — M48.061 SPINAL STENOSIS OF LUMBAR REGION WITHOUT NEUROGENIC CLAUDICATION: ICD-10-CM

## 2021-09-16 RX ORDER — IRBESARTAN 300 MG/1
TABLET ORAL
Qty: 90 TABLET | Refills: 3 | Status: SHIPPED | OUTPATIENT
Start: 2021-09-16 | End: 2022-08-22

## 2021-09-16 NOTE — TELEPHONE ENCOUNTER
Requested Prescriptions     Pending Prescriptions Disp Refills    irbesartan (AVAPRO) 300 MG tablet [Pharmacy Med Name: IRBESARTAN TABS 300MG] 90 tablet 3     Sig: TAKE 1 TABLET DAILY                  Last Office Visit: 7/22/2021     Next Office Visit: 7/14/2022

## 2021-09-18 RX ORDER — TRAMADOL HYDROCHLORIDE 50 MG/1
TABLET ORAL
Qty: 360 TABLET | Refills: 0 | Status: SHIPPED | OUTPATIENT
Start: 2021-09-18 | End: 2022-03-04

## 2021-09-18 RX ORDER — BISOPROLOL FUMARATE AND HYDROCHLOROTHIAZIDE 10; 6.25 MG/1; MG/1
TABLET ORAL
Qty: 90 TABLET | Refills: 3 | Status: SHIPPED | OUTPATIENT
Start: 2021-09-18 | End: 2022-07-14 | Stop reason: SDUPTHER

## 2021-09-18 RX ORDER — HYDROCHLOROTHIAZIDE 12.5 MG/1
CAPSULE, GELATIN COATED ORAL
Qty: 90 CAPSULE | Refills: 3 | Status: SHIPPED | OUTPATIENT
Start: 2021-09-18 | End: 2022-09-19

## 2021-11-23 RX ORDER — GABAPENTIN 300 MG/1
CAPSULE ORAL
Qty: 90 CAPSULE | Refills: 0 | Status: SHIPPED | OUTPATIENT
Start: 2021-11-23 | End: 2022-02-16

## 2021-12-13 ENCOUNTER — OFFICE VISIT (OUTPATIENT)
Dept: INTERNAL MEDICINE CLINIC | Age: 81
End: 2021-12-13
Payer: MEDICARE

## 2021-12-13 VITALS
HEART RATE: 72 BPM | WEIGHT: 194 LBS | SYSTOLIC BLOOD PRESSURE: 140 MMHG | DIASTOLIC BLOOD PRESSURE: 92 MMHG | HEIGHT: 64 IN | OXYGEN SATURATION: 96 % | RESPIRATION RATE: 12 BRPM | BODY MASS INDEX: 33.12 KG/M2

## 2021-12-13 DIAGNOSIS — I10 BENIGN ESSENTIAL HTN: Primary | ICD-10-CM

## 2021-12-13 DIAGNOSIS — N18.31 STAGE 3A CHRONIC KIDNEY DISEASE (HCC): ICD-10-CM

## 2021-12-13 DIAGNOSIS — G89.29 CHRONIC BILATERAL LOW BACK PAIN WITHOUT SCIATICA: ICD-10-CM

## 2021-12-13 DIAGNOSIS — M54.50 CHRONIC BILATERAL LOW BACK PAIN WITHOUT SCIATICA: ICD-10-CM

## 2021-12-13 DIAGNOSIS — E78.2 HYPERLIPIDEMIA, MIXED: ICD-10-CM

## 2021-12-13 DIAGNOSIS — M48.061 SPINAL STENOSIS OF LUMBAR REGION WITHOUT NEUROGENIC CLAUDICATION: ICD-10-CM

## 2021-12-13 DIAGNOSIS — I25.10 CORONARY ARTERY DISEASE INVOLVING NATIVE CORONARY ARTERY OF NATIVE HEART WITHOUT ANGINA PECTORIS: ICD-10-CM

## 2021-12-13 PROCEDURE — 1123F ACP DISCUSS/DSCN MKR DOCD: CPT | Performed by: INTERNAL MEDICINE

## 2021-12-13 PROCEDURE — 4040F PNEUMOC VAC/ADMIN/RCVD: CPT | Performed by: INTERNAL MEDICINE

## 2021-12-13 PROCEDURE — G8417 CALC BMI ABV UP PARAM F/U: HCPCS | Performed by: INTERNAL MEDICINE

## 2021-12-13 PROCEDURE — G8399 PT W/DXA RESULTS DOCUMENT: HCPCS | Performed by: INTERNAL MEDICINE

## 2021-12-13 PROCEDURE — 99214 OFFICE O/P EST MOD 30 MIN: CPT | Performed by: INTERNAL MEDICINE

## 2021-12-13 PROCEDURE — G8427 DOCREV CUR MEDS BY ELIG CLIN: HCPCS | Performed by: INTERNAL MEDICINE

## 2021-12-13 PROCEDURE — 1090F PRES/ABSN URINE INCON ASSESS: CPT | Performed by: INTERNAL MEDICINE

## 2021-12-13 PROCEDURE — G8482 FLU IMMUNIZE ORDER/ADMIN: HCPCS | Performed by: INTERNAL MEDICINE

## 2021-12-13 PROCEDURE — 1036F TOBACCO NON-USER: CPT | Performed by: INTERNAL MEDICINE

## 2022-02-16 RX ORDER — GABAPENTIN 300 MG/1
CAPSULE ORAL
Qty: 90 CAPSULE | Refills: 0 | Status: SHIPPED | OUTPATIENT
Start: 2022-02-16 | End: 2022-05-17

## 2022-03-04 DIAGNOSIS — M47.816 LUMBAR FACET ARTHROPATHY: ICD-10-CM

## 2022-03-04 DIAGNOSIS — M25.551 BILATERAL HIP PAIN: ICD-10-CM

## 2022-03-04 DIAGNOSIS — M54.50 CHRONIC BILATERAL LOW BACK PAIN WITHOUT SCIATICA: ICD-10-CM

## 2022-03-04 DIAGNOSIS — M25.552 BILATERAL HIP PAIN: ICD-10-CM

## 2022-03-04 DIAGNOSIS — G89.29 CHRONIC BILATERAL LOW BACK PAIN WITHOUT SCIATICA: ICD-10-CM

## 2022-03-04 DIAGNOSIS — M48.061 SPINAL STENOSIS OF LUMBAR REGION WITHOUT NEUROGENIC CLAUDICATION: ICD-10-CM

## 2022-03-04 RX ORDER — TRAMADOL HYDROCHLORIDE 50 MG/1
TABLET ORAL
Qty: 360 TABLET | Refills: 0 | Status: SHIPPED | OUTPATIENT
Start: 2022-03-04 | End: 2022-06-03

## 2022-05-16 RX ORDER — EVOLOCUMAB 140 MG/ML
INJECTION, SOLUTION SUBCUTANEOUS
Qty: 2 ML | Refills: 5 | Status: SHIPPED | OUTPATIENT
Start: 2022-05-16 | End: 2022-07-29 | Stop reason: ALTCHOICE

## 2022-05-16 NOTE — TELEPHONE ENCOUNTER
Requested Prescriptions     Pending Prescriptions Disp Refills    [address] 631 MG/ML [place] [Pharmacy Med Name: Jack Clam 140MG/ML PF AUTO INJ] 2 mL 5     Sig: INJECT 140MG UNDER THE SKIN EVERY 27 DAYS                Last Office Visit: 7/22/2021     Next Office Visit:7/14/2022      Last Labs: last LDL 12/13/2021 132

## 2022-05-17 RX ORDER — GABAPENTIN 300 MG/1
CAPSULE ORAL
Qty: 90 CAPSULE | Refills: 0 | Status: SHIPPED | OUTPATIENT
Start: 2022-05-17 | End: 2022-08-22

## 2022-06-13 SDOH — HEALTH STABILITY: PHYSICAL HEALTH: ON AVERAGE, HOW MANY MINUTES DO YOU ENGAGE IN EXERCISE AT THIS LEVEL?: 0 MIN

## 2022-06-13 SDOH — HEALTH STABILITY: PHYSICAL HEALTH: ON AVERAGE, HOW MANY DAYS PER WEEK DO YOU ENGAGE IN MODERATE TO STRENUOUS EXERCISE (LIKE A BRISK WALK)?: 0 DAYS

## 2022-06-13 ASSESSMENT — PATIENT HEALTH QUESTIONNAIRE - PHQ9
1. LITTLE INTEREST OR PLEASURE IN DOING THINGS: 0
SUM OF ALL RESPONSES TO PHQ QUESTIONS 1-9: 0
SUM OF ALL RESPONSES TO PHQ9 QUESTIONS 1 & 2: 0
SUM OF ALL RESPONSES TO PHQ QUESTIONS 1-9: 0
2. FEELING DOWN, DEPRESSED OR HOPELESS: 0
SUM OF ALL RESPONSES TO PHQ QUESTIONS 1-9: 0
SUM OF ALL RESPONSES TO PHQ QUESTIONS 1-9: 0

## 2022-06-13 ASSESSMENT — LIFESTYLE VARIABLES
HOW OFTEN DO YOU HAVE A DRINK CONTAINING ALCOHOL: 1
HOW MANY STANDARD DRINKS CONTAINING ALCOHOL DO YOU HAVE ON A TYPICAL DAY: PATIENT DECLINED
HOW MANY STANDARD DRINKS CONTAINING ALCOHOL DO YOU HAVE ON A TYPICAL DAY: 98
HOW OFTEN DO YOU HAVE SIX OR MORE DRINKS ON ONE OCCASION: 1
HOW OFTEN DO YOU HAVE A DRINK CONTAINING ALCOHOL: NEVER

## 2022-06-20 ENCOUNTER — OFFICE VISIT (OUTPATIENT)
Dept: INTERNAL MEDICINE CLINIC | Age: 82
End: 2022-06-20
Payer: MEDICARE

## 2022-06-20 VITALS
HEART RATE: 69 BPM | HEIGHT: 64 IN | RESPIRATION RATE: 12 BRPM | SYSTOLIC BLOOD PRESSURE: 138 MMHG | OXYGEN SATURATION: 94 % | BODY MASS INDEX: 34.52 KG/M2 | WEIGHT: 202.2 LBS | DIASTOLIC BLOOD PRESSURE: 82 MMHG

## 2022-06-20 DIAGNOSIS — E78.2 HYPERLIPIDEMIA, MIXED: ICD-10-CM

## 2022-06-20 DIAGNOSIS — Z13.6 SCREENING FOR CARDIOVASCULAR CONDITION: ICD-10-CM

## 2022-06-20 DIAGNOSIS — M54.50 CHRONIC BILATERAL LOW BACK PAIN WITHOUT SCIATICA: ICD-10-CM

## 2022-06-20 DIAGNOSIS — Z78.0 POSTMENOPAUSAL: ICD-10-CM

## 2022-06-20 DIAGNOSIS — M47.816 LUMBAR FACET ARTHROPATHY: ICD-10-CM

## 2022-06-20 DIAGNOSIS — I10 BENIGN ESSENTIAL HTN: ICD-10-CM

## 2022-06-20 DIAGNOSIS — G89.29 CHRONIC BILATERAL LOW BACK PAIN WITHOUT SCIATICA: ICD-10-CM

## 2022-06-20 DIAGNOSIS — N18.31 STAGE 3A CHRONIC KIDNEY DISEASE (HCC): ICD-10-CM

## 2022-06-20 DIAGNOSIS — M85.88 OSTEOPENIA OF LUMBAR SPINE: ICD-10-CM

## 2022-06-20 DIAGNOSIS — R73.01 IMPAIRED FASTING GLUCOSE: ICD-10-CM

## 2022-06-20 DIAGNOSIS — Z00.00 MEDICARE ANNUAL WELLNESS VISIT, SUBSEQUENT: Primary | ICD-10-CM

## 2022-06-20 DIAGNOSIS — I25.10 CORONARY ARTERY DISEASE INVOLVING NATIVE CORONARY ARTERY OF NATIVE HEART WITHOUT ANGINA PECTORIS: ICD-10-CM

## 2022-06-20 PROCEDURE — G0446 INTENS BEHAVE THER CARDIO DX: HCPCS | Performed by: INTERNAL MEDICINE

## 2022-06-20 PROCEDURE — G0439 PPPS, SUBSEQ VISIT: HCPCS | Performed by: INTERNAL MEDICINE

## 2022-06-20 PROCEDURE — 1123F ACP DISCUSS/DSCN MKR DOCD: CPT | Performed by: INTERNAL MEDICINE

## 2022-06-20 RX ORDER — TRAMADOL HYDROCHLORIDE 50 MG/1
50 TABLET ORAL 2 TIMES DAILY
COMMUNITY
End: 2022-06-27

## 2022-06-20 NOTE — PROGRESS NOTES
[facility]) Physicians  Internal Medicine  Patient Encounter  Sherry Saul D.O., [facility] Annual Wellness Visit  Adithya Montalvo  6/20/2022    Subjective Adithya Montalvo is here for Iqra Cai AWV    Chief Complaint   Patient presents with    Medicare AWV         HPI: This is an 50-year-old female seen today for her Medicare annual wellness visit.       Medical/Surgical Histories     Past Medical History:   Diagnosis Date    Acquired spondylolisthesis of lumbosacral region     Bilateral primary osteoarthritis of hip     CAD (coronary artery disease)     one stent in heart    Chronic bilateral low back pain without sciatica 2/8/2018    CKD (chronic kidney disease), stage III (HCC)     DDD (degenerative disc disease), lumbar     Dr. Sabas Stewart GERD (gastroesophageal reflux disease)     Headache(784.0)     Heel spur 08/2016    History of lumbar spinal fusion     Hyperlipidemia     Hypertension     IBS (irritable bowel syndrome)     Lyme disease 2009    suspected    Medical history reviewed with no changes     MVP (mitral valve prolapse)     Neuroma nos     Osteoarthritis     Osteopenia     Post-laminectomy syndrome     Rotator cuff tear     Dr. Roena Ormond    Squamous cell cancer of skin of right hand 05/2018    squamous cell carcinoma    Stress incontinence, female     Urinary incontinence           Past Surgical History:   Procedure Laterality Date    BREAST SURGERY      cyst removal    COLONOSCOPY  2006    CORONARY ANGIOPLASTY WITH STENT PLACEMENT      x1    HC INJECTION PROCEDURE FOR SACROILIAC JOINT Bilateral 7/17/2020    BILATERAL SACROILIAC JOINT INJECTION WITH FLUOROSCOPY performed by Tegan Sandoval MD at [address] [place] [address]  5/19/14    LUMBAR 4-5 DECOMPRESSIVE LAMINECTOMY AND POSTERIOR LATERAL FUSION LUMBAR 4-5 AND PEDICLE SCREW FIXATION     LAMINECTOMY POSTERIOR THORACIC / LUMBAR Right 4/9/2019    THORACIC LAMINECTOMY WITH PADDLE LEAD AND RECHARGEBLE BATTERY IN RIGHT HIP performed by Carissa Garcia MD at [facility]  10/02/2019    R nasal sidewall    MD ARTHROCENTESIS ASPIR&/INJ INTERM JT/BURS W/O US N/A 10/24/2018    MIDLINE CAUDEL INJECTION WITH FLUOROSCOPY performed by Benny Guadalupe MD at [address] [facility] [address] [facility]  2002    stent, LAD    SKIN CANCER EXCISION Right 05/24/2018    Right dorsal hand, Dr. Weathers Part N/A 3/11/2019    SPINAL CORD STIMULATOR IMPLANT TRIAL WITH FLUOROSCOPY WITH NEVRO performed by Benny Guadalupe MD at [address] [facility]      age 10           Medications/Allergies     Medication Sig    Turmeric 450 MG CAPS Take 450 mg by mouth daily    gabapentin (NEURONTIN) 300 MG capsule TAKE 1 CAPSULE BY MOUTH EVERY NIGHT    REPATHA SURECLICK 042 MG/ML SOAJ INJECT 140MG UNDER THE SKIN EVERY 30 DAYS    bisoprolol-hydroCHLOROthiazide (ZIAC) 10-6.25 MG per tablet TAKE 1 TABLET DAILY    hydroCHLOROthiazide (MICROZIDE) 12.5 MG capsule TAKE 1 CAPSULE DAILY    irbesartan (AVAPRO) 300 MG tablet TAKE 1 TABLET DAILY    vitamin E 400 UNIT capsule Take 400 Units by mouth nightly    aspirin 81 MG tablet Take 81 mg by mouth daily    Multiple Vitamins-Minerals (THERAPEUTIC MULTIVITAMIN-MINERALS) tablet Take 1 tablet by mouth daily    acetaminophen (TYLENOL) 325 MG tablet Take 650 mg by mouth every 6 hours as needed for Pain    melatonin (RA MELATONIN) 3 MG TABS tablet Take 1 tablet by mouth nightly.  Misc Natural Products (OSTEO BI-FLEX TRIPLE STRENGTH PO) Take 1 tablet by mouth 2 times daily      No current facility-administered medications for this visit.         Allergies   Allergen Reactions    Aquaphor [Aquaphilic]     Other     Statins Other (See Comments)     Severe muscle pain    Adhesive Tape Itching and Rash     Pt Okay with paper tape         Substance Use History     Social History     Tobacco Use    Smoking status: Never Smoker    Smokeless tobacco: Never Used   Vaping Use    Vaping Use: Never used   Substance Use Topics    Alcohol use: No    Drug use: Never          Family History     Family History   Problem Relation Age of Onset    Diabetes Brother     Diabetes Mother     Heart Disease Mother     Heart Disease Father     Cancer Father         skin          CARE TEAM  (Including outside providers/suppliers regularly involved in providing care):   Patient Care Team:  Cesar Cobos DO as PCP - General (Internal Medicine)  Cesar Cobos DO as PCP [facility]  Robret Jenkins MD as Consulting Physician (Nephrology)  Mary Kim MD as Consulting Physician (Pain Management)      Patient's complete Health Risk Assessment and screening values have been reviewed and are found in [address]. The following risks were reviewed today and where indicated follow up appointments were made and/or referrals ordered.     Positive Risk Factor Screenings with Interventions:               General Health and ACP:  General  In general, how would you say your health is?: Fair  In the past 7 days, have you experienced any of the following: New or Increased Pain, New or Increased Fatigue, Loneliness, Social Isolation, Stress or Anger?: No  Do you get the social and emotional support that you need?: Yes  Do you have a Living Will?: Yes    Advance Directives     Power of  Living Will ACP-Advance Directive ACP-Power of     Not on File Not on File Not on File Not on File      General Health Risk Interventions:  · No Living Will: ACP documents already completed- patient asked to provide copy to the office    Health Habits/Nutrition:     Physical Activity: Inactive    Days of Exercise per Week: 0 days    Minutes of Exercise per Session: 0 min     Have you lost any weight without trying in the past 3 months?: No  Body mass index: (!) 34.7  Have you seen the dentist within the past year?: Yes    Health Habits/Nutrition Interventions:  · Inadequate physical activity:  Counseled patient on the importance of trying to be more physically active. We talked about different exercises she can do in the home. · Nutritional issues:  educational materials for healthy, well-balanced diet provided      ADLs:  In the past 7 days, did you need help from others to perform any of the following everyday activities: Eating, dressing, grooming, bathing, toileting, or walking/balance?: No  In the past 7 days, did you need help from others to take care of any of the following: Laundry, housekeeping, banking/finances, shopping, telephone use, food preparation, transportation, or taking medications?: (!) Yes  Select all that apply: (!) Housekeeping,Shopping,Food Preparation,Transportation    ADL Interventions:  · Patient counseled on community services that might be helpful such as [place] [facility]        ROS:  MS:  Chronic low back pain. She is seeing Dr. Sadie العراقي. She is getting injections. No correspondence. She states the injections help, but transiently. She is on Gabapentin. She states Dr. Sadie العراقي had advised to \"phase out\" the Tramadol. She is on this twice daily. She also uses the Tylenol. She has tried [employer]. She states the pain is exacerbated and she states she can hardly walk. She does some chair exercises. She also has a SCS. She is seeing another orthopedist-- Dr. Ladonna Rosas for her knees. Again, NO correspondence. OBJECTIVE       Vitals:    06/20/22 1040 06/20/22 1049 06/20/22 1109   BP: (!) 146/78 (!) 142/84 138/82   Site: Right Upper Arm Left Upper Arm Right Upper Arm   Position: Sitting Sitting Sitting   Cuff Size: Large Adult Large Adult Large Adult   Pulse: 77 75 69   Resp: 12 12 12   SpO2: 99% 94% 94%   Weight: 202 lb 3.2 oz (91.7 kg)     Height: 5' 4\" (1.626 m)       Body mass index is 34.71 kg/m².      Wt Readings from Last 3 Encounters:   06/20/22 202 lb 3.2 oz (91.7 kg)   12/13/21 194 lb (88 kg) 08/06/21 199 lb 6.4 oz (90.4 kg)     BP Readings from Last 3 Encounters:   06/20/22 138/82   12/13/21 (!) 140/92   07/22/21 124/80        GEN:  81 y.o. female who is in NAD, A&O. She appears stated age and well nourished. She is cooperative and pleasant. Overweight  HEAD:  NC/AT, no lesions. [place] EARS:  EAC's clear, TM's normal.-  MOUTH & THROAT:  Oral cavity is clear without mucosal lesions. Tongue is midline. Dentition is in good repair. No pharyngeal erythema or exudate. NECK:  Supple. Full ROM. Trachea is midline. No increased JVD. No thyromegaly or nodules. No masses  CARDIAC:  S1S2 NL. Regular rhythm. No murmur/clicks/rubs. No ectopy. PMI is non-displaced. VASC:  Pedal pulses 2/4. Carotid upstrokes 2+. No bruits noted. PULM:  Lungs are CTA. Symmetric breath sounds noted. AP Diameter NL. GI:  Abdomen is soft and nontender. No distension. No organomegaly. No masses. No pulsatile masses. EXT:  Trace LE edema. SKIN: Warm and dry, normal turgor, no rash or lesions of concern. NEURO: No focal or lateralizing deficits. MS:  No C/T/L paraspinal tenderness. No scoliosis. No joint effusions. Full joint ROM. PSYCH:  Mood and affect NL. Judgement and insight NL. Reviewed and updated this visit:  Tobacco  Allergies  Meds  Med Hx  Surg Hx  Soc Hx  Fam Hx        ASSESSMENT/PLAN     1. Medicare annual wellness visit, subsequent  All care gaps identified and addressed  Lab work  DEXA scan  - HI Intens behave ther cardio dx, 15 minutes []    2. Stage 3a chronic kidney disease ([facility] [address]    - CBC with Auto Differential; Future  - Comprehensive Metabolic Panel; Future    3. Screening for cardiovascular condition    - HI Intens behave ther cardio dx, 15 minutes []    4. Benign essential HTN    - CBC with Auto Differential; Future  - Comprehensive Metabolic Panel; Future  - Lipid Panel; Future  - TSH; Future    5.  Hyperlipidemia, mixed    - Comprehensive Metabolic Panel; Future  - Lipid Panel; Future    6. Coronary artery disease involving native coronary artery of native heart without angina pectoris    - Lipid Panel; Future    7. Impaired fasting glucose    - Comprehensive Metabolic Panel; Future  - Hemoglobin A1C; Future    8. Chronic bilateral low back pain without sciatica  9. Lumbar facet arthropathy  Also has history of spinal stenosis  Patient will continue to follow-up with Dr. Raul Hill  I have encouraged patient to request that the correspond with my office. Recommended she try topical lidocaine (Aspercreme with lidocaine lotion/ointment or patches). I have recommended she use this twice daily  Tramadol twice daily does seem to have some benefit. 10. Postmenopausal  11. Osteopenia of lumbar spine  Due for bone density  Counseled on calcium and vitamin D  - DEXA BONE DENSITY 2 SITES; Future          Recommendations for Preventive Services Due: see orders and patient instructions/AVS.  Recommended screening schedule for the next 5-10 years is provided to the patient in written form: see Patient Instructions/AVS.    Return for Medicare Annual Wellness Visit in 1 year. Cardiovascular Disease Risk Counseling: Assessed the patient's risk to develop cardiovascular disease and reviewed main risk factors. Reviewed steps to reduce disease risk including:   · Quitting tobacco use, reducing amount smoked, or not starting the habit  · Making healthy food choices  · Being physically active and gradualy increasing activity levels   · Reduce weight and determine a healthy BMI goal  · Monitor blood pressure and treat if higher than 140/90 mmHg  · Maintain blood total cholesterol levels under 5 mmol/l or 190 mg/dl  · Maintain LDL cholesterol levels under 3.0 mmol/l or 115 mg/dl   · Control blood glucose levels  · Consider taking aspirin (75 mg daily), once blood pressure is controlled   Provided a follow up plan.   Time spent (minutes): 15 minutes

## 2022-06-20 NOTE — PATIENT INSTRUCTIONS
Personalized Preventive Plan for Deedee Hornre - 6/20/2022  Medicare offers a range of preventive health benefits. Some of the tests and screenings are paid in full while other may be subject to a deductible, co-insurance, and/or copay. Some of these benefits include a comprehensive review of your medical history including lifestyle, illnesses that may run in your family, and various assessments and screenings as appropriate. After reviewing your medical record and screening and assessments performed today your provider may have ordered immunizations, labs, imaging, and/or referrals for you. A list of these orders (if applicable) as well as your Preventive Care list are included within your After Visit Summary for your review. Other Preventive Recommendations:    · A preventive eye exam performed by an eye specialist is recommended every 1-2 years to screen for glaucoma; cataracts, macular degeneration, and other eye disorders. · A preventive dental visit is recommended every 6 months. · Try to get at least 150 minutes of exercise per week or 10,000 steps per day on a pedometer . · Order or download the FREE \"Exercise & Physical Activity: Your Everyday Guide\" from [employer]. Call 2-376.745.5754 or search [employer] online. · You need 3007-3057 mg of calcium and 8373-0735 IU of vitamin D per day. It is possible to meet your calcium requirement with diet alone, but a vitamin D supplement is usually necessary to meet this goal.  · When exposed to the sun, use a sunscreen that protects against both UVA and UVB radiation with an SPF of 30 or greater. Reapply every 2 to 3 hours or after sweating, drying off with a towel, or swimming. · Always wear a seat belt when traveling in a car.    · Try Aspercreme with Lidocaine lotion/ointment or patch twice daily        ·              Advance Directives: Care Instructions  Overview  An advance directive is a legal way to state your wishes at the end of your life. It tells your family and your doctor what to do if you can't say what youwant. There are two main types of advance directives. You can change them any timeyour wishes change. Living will. This form tells your family and your doctor your wishes about life support and other treatment. The form is also called a declaration. Medical power of . This form lets you name a person to make treatment decisions for you when you can't speak for yourself. This person is called a health care agent (health care proxy, health care surrogate). The form is also called a durable power of  for health care. If you do not have an advance directive, decisions about your medical care maybe made by a family member, or by a doctor or a  who doesn't know you. It may help to think of an advance directive as a gift to the people who carefor you. If you have one, they won't have to make tough decisions by themselves. Follow-up care is a key part of your treatment and safety. Be sure to make and go to all appointments, and call your doctor if you are having problems. It's also a good idea to know your test results and keep alist of the medicines you take. What should you include in an advance directive? Many states have a unique advance directive form. (It may ask you to address specific issues.) Or you might use a universal form that's approved by manystates. If your form doesn't tell you what to address, it may be hard to know what to include in your advance directive. Use the questions below to help you getstarted.  Who do you want to make decisions about your medical care if you are not able to?  What life-support measures do you want if you have a serious illness that gets worse over time or can't be cured?  What are you most afraid of that might happen?  (Maybe you're afraid of having pain, losing your independence, or being kept alive by machines.)   Where would you prefer to die? (Your home? A hospital? A nursing home?)   Do you want to donate your organs when you die?  Do you want certain Congregation practices performed before you die? When should you call for help? Be sure to contact your doctor if you have any questions. Where can you learn more? Go to https://chpepicewmiguel.Wriggle. org and sign in to your [employer] account. Enter R264 in the [employer] box to learn more about \"Advance Directives: Care Instructions. \"     If you do not have an account, please click on the \"Sign Up Now\" link. Current as of: October 18, 2021               Content Version: 13.2  © 2006-2022 Healthwise, [employer]. Care instructions adapted under license by [facility] ([facility]). If you have questions about a medical condition or this instruction, always ask your healthcare professional. [place] [address] any warranty or liability for your use of this information. Learning About Travis Benavides  What is a living will? A living will, also called a declaration, is a legal form. It tells your family and your doctor your wishes when you can't speak for yourself. It's used by the health professionals who will treat you as you near the end of your life or ifyou get seriously hurt or ill. If you put your wishes in writing, your loved ones and others will know what kind of care you want. They won't need to guess. This can ease your mind and behelpful to others. And you can change or cancel your living will at any time. A living will is not the same as an estate or property will. An estate willexplains what you want to happen with your money and property after you die. How do you use it? Keep these facts in mind about how a living will is used.  Your living will is used only if you can't speak or make decisions for yourself.  Most often, one or more doctors must certify that you can't speak or decide for yourself before your living will takes effect.  If you get better and can speak for yourself again, you can accept or refuse any treatment. It doesn't matter what you said in your living will.  Some states may limit your right to refuse treatment in certain cases. For example, you may need to clearly state in your living will that you don't want artificial hydration and nutrition, such as being fed through a tube. Is a living will a legal document? A living will is a legal document. Each state has its own laws about livingwills. And a living will may be called something else in your state. Here are some things to know about living hobbs.  You don't need an  to complete a living will. But legal advice can be helpful if your state's laws are unclear. It can also help if your health history is complicated or your family can't agree on what should be in your living will.  You can change your living will at any time. Some people find that their wishes about end-of-life care change as their health changes. If you make big changes to your living will, complete a new form.  If you move to another state, make sure that your living will is legal in the state where you now live. In most cases, doctors will respect your wishes even if you have a form from a different state.  You might use a universal form that has been approved by many states. This kind of form can sometimes be filled out and stored online. Your digital copy will then be available wherever you have a connection to the internet. The doctors and nurses who need to treat you can find it right away.  Your state may offer an online registry. This is another place where you can store your living will online.  It's a good idea to get your living will notarized. This means using a person called a  to watch two people sign, or witness, your living will. What should you know when you create a living will?   Here are some questions to ask yourself as you make your living will.  Do you know enough about life support methods that might be used? If not, talk to your doctor so you know what might be done if you can't breathe on your own, your heart stops, or you can't swallow.  What things would you still want to be able to do after you receive life-support methods? Would you want to be able to walk? To speak? To eat on your own? To live without the help of machines?  Do you want certain Anglican practices performed if you become very ill?  If you have a choice, where do you want to be cared for? In your home? At a hospital or nursing home?  If you have a choice at the end of your life, where would you prefer to die? At home? In a hospital or nursing home? Somewhere else?  Would you prefer to be buried or cremated?  Do you want your organs to be donated after you die? What should you do with your living will?  Make sure that your family members and your health care agent have copies of your living will (also called a declaration).  Give your doctor a copy of your living will. Ask to have it kept as part of your medical record. If you have more than one doctor, make sure that each one has a copy.  Put a copy of your living will where it can be easily found. For example, some people may put a copy on their refrigerator door. If you are using a digital copy, be sure your doctor, family members, and health care agent know how to find and access it. Where can you learn more? Go to https://chjonah.healthPeppercorn. org and sign in to your [employer] account. Enter L223 in the [facility] box to learn more about \"Learning About Living Perrohamida. \"     If you do not have an account, please click on the \"Sign Up Now\" link. Current as of: October 18, 2021               Content Version: 13.2  © 0124-7570 Healthwise, Incorporated. Care instructions adapted under license by [facility] ([facility]).  If you have questions about a medical condition or this instruction, always ask your healthcare professional. [place] [address] any warranty or liability for your use of this information. Learning About Cutting Calories  How do calories affect your weight? Food gives your body energy. Energy from the food you eat is measured in calories. This energy keeps your heart beating, your brain active, and yourmuscles working. Your body needs a certain number of calories each day. After your body uses thecalories it needs, it stores extra calories as fat. To lose weight safely, you have to eat fewer calories while eating in a healthyway. How many calories do you need each day? The more active you are, the more calories you need. When you are less active, you need fewer calories. How many calories you need each day also depends onseveral things, including your age and whether you are male or female. Here are some general guidelines for adults:   Less active women and older adults need 1,600 to 2,000 calories each day.  Active women and less active men need 2,000 to 2,400 calories each day.  Active men need 2,400 to 3,000 calories each day. How can you cut calories and eat healthy meals? Whole grains, vegetables and fruits, and dried beans are good lower-caloriefoods. They give you lots of nutrients and fiber. And they fill you up. Sweets, energy drinks, and soda pop are high in calories. They give you few nutrients and no fiber. Try to limit soda pop, fruit juice, and energy drinks. Drink water instead. Some fats can be part of a healthy diet. But cutting back on fats from highly processed foods like fast foods and many snack foods is a good way to lower the calories in your diet. Also, use smaller amounts of fats like butter, margarine, salad dressing, and mayonnaise. Add fresh garlic, lemon, or herbs toyour meals to add flavor without adding fat. Meats and dairy products can be a big source of hidden fats.  Try to choose leanor low-fat versions of these products. Fat-free cookies, candies, chips, and frozen treats can still be high in sugar and calories. Some fat-free foods have more calories than regular ones. Eatfat-free treats in moderation, as you would other foods. If your favorite foods are high in fat, salt, sugar, or calories, limit how often you eat them. Eat smaller servings, or look for healthy substitutes. Fillup on fruits, vegetables, and whole grains. Eating at home   Use meat as a side dish instead of as the main part of your meal.   Try main dishes that use whole wheat pasta, brown rice, dried beans, or vegetables.  Find ways to cook with little or no fat, such as broiling, steaming, or grilling.  Use cooking spray instead of oil. If you use oil, use a monounsaturated oil, such as canola or olive oil.  Trim fat from meats before you cook them.  Drain off fat after you brown the meat or while you roast it.  Chill soups and stews after you cook them. Then skim the fat off the top after it hardens. Eating out   Order foods that are broiled or poached rather than fried or breaded.  Cut back on the amount of butter or margarine that you use on bread.  Order sauces, gravies, and salad dressings on the side, and use only a little.  When you order pasta, choose tomato sauce rather than cream sauce.  Ask for salsa with your baked potato instead of sour cream, butter, cheese, or sweeney.  Order meals in a small size instead of upgrading to a large.  Share an entree, or take part of your food home to eat as another meal.   Share appetizers and desserts. Where can you learn more? Go to https://Laimoon.compejeanetheweb.Storitz. org and sign in to your [employer](Mosen) account. Enter J922 in the [employer] box to learn more about \"Learning About Cutting Calories. \"     If you do not have an account, please click on the \"Sign Up Now\" link.   Current as of: September 8, 2021               Content Version: 13.2  © 2006-2022 Healthwise, Incorporated. Care instructions adapted under license by [facility] ([facility]). If you have questions about a medical condition or this instruction, always ask your healthcare professional. [address] any warranty or liability for your use of this information. Learning About Low-Carbohydrate Diets  What is a low-carbohydrate diet? A low-carbohydrate (or \"low-carb\") diet limits foods and drinks that have carbohydrates. This includes grains, fruits, milk and yogurt, and starchy vegetables like potatoes, beans, and corn. It also avoids foods and drinks that have added sugar. Instead, low-carb diets include foods that are high inprotein and fat. Why might you follow a low-carb diet? Low-carb diets may be used for a variety of reasons, such as for weight loss. People who have diabetes may use a low-carb diet to help manage their bloodsugar levels. What should you do before you start the diet? Talk to your doctor before you try any diet. This is even more important if you have health problems like kidney disease, heart disease, or diabetes. Your doctor may suggest that you meet with a registered dietitian. A dietitian canhelp you make an eating plan that works for you. What foods do you eat on a low-carb diet? On a low-carb diet, you choose foods that are high in protein and fat. Examplesof these are:  [employer], poultry, and fish.  Eggs.  Nuts, such as walnuts, pecans, almonds, and peanuts.  Peanut butter and other nut butters.  Tofu.  Avocado.  Olives.  Non-starchy vegetables like broccoli, cauliflower, green beans, mushrooms, peppers, lettuce, and spinach.  Unsweetened non-dairy milks like almond milk and coconut milk.  Cheese, cottage cheese, and cream cheese. Where can you learn more? Go to https://rosa.healthDonorSearch. org and sign in to your [employer] account.  Enter C335 in the [facility] box to learn more about \"Learning About Low-Carbohydrate Diets. \"     If you do not have an account, please click on the \"Sign Up Now\" link. Current as of: September 8, 2021               Content Version: 13.2  © 2006-2022 Healthwise, Blink. Care instructions adapted under license by [facility] ([facility]). If you have questions about a medical condition or this instruction, always ask your healthcare professional. [address] any warranty or liability for your use of this information. Eating Healthy Foods: Care Instructions  Your Care Instructions     Eating healthy foods can help lower your risk for disease. Healthy food gives you energy and keeps your heart strong, your brain active, your musclesworking, and your bones strong. A healthy diet includes a variety of foods from the basic food groups: grains, vegetables, fruits, milk and milk products, and meat and beans. Some people may eat more of their favorite foods from only one food group and, as a result, miss getting the nutrients they need. So, it is important to pay attention not only to what you eat but also to what you are missing from your diet. You caneat a healthy, balanced diet by making a few small changes. Follow-up care is a key part of your treatment and safety. Be sure to make and go to all appointments, and call your doctor if you are having problems. It's also a good idea to know your test results and keep alist of the medicines you take. How can you care for yourself at home? Look at what you eat   Keep a food diary for a week or two and record everything you eat or drink. Track the number of servings you eat from each food group.  For a balanced diet every day, eat a variety of:  ? 6 or more ounce-equivalents of grains, such as cereals, breads, crackers, rice, or pasta, every day.  An ounce-equivalent is 1 slice of bread, 1 cup of ready-to-eat cereal, or ½ cup of cooked rice, cooked pasta, or cooked cereal.  ? 2½ cups of entirely.  Eat a wide variety of foods. Make healthy choices when eating out   The type of restaurant you choose can help you make healthy choices. Even fast-food chains are now offering more low-fat or healthier choices on the menu.  Choose smaller portions, or take half of your meal home.  When eating out, try:  ? A veggie pizza with a whole wheat crust or grilled chicken (instead of sausage or pepperoni). ? Pasta with roasted vegetables, grilled chicken, or marinara sauce instead of cream sauce. ? A vegetable wrap or grilled chicken wrap. ? Broiled or poached food instead of fried or breaded items. Make healthy choices easy   Buy packaged, prewashed, ready-to-eat fresh vegetables and fruits, such as baby carrots, salad mixes, and chopped or shredded broccoli and cauliflower.  Buy packaged, presliced fruits, such as melon or pineapple.  Choose 100% fruit or vegetable juice instead of soda. Limit juice intake to 4 to 6 oz (½ to ¾ cup) a day.  Blend low-fat yogurt, fruit juice, and canned or frozen fruit to make a smoothie for breakfast or a snack. Where can you learn more? Go to https://Identica HoldingspeRevokom.WigWag. org and sign in to your [employer] account. Enter P556 in the [facility] box to learn more about \"Eating Healthy Foods: Care Instructions. \"     If you do not have an account, please click on the \"Sign Up Now\" link. Current as of: September 8, 2021               Content Version: 13.2  © 2006-2022 Healthwise, Incorporated. Care instructions adapted under license by [facility] ([facility]). If you have questions about a medical condition or this instruction, always ask your healthcare professional. [place] [address] any warranty or liability for your use of this information. Patient Education        Low Sodium Diet (2,000 Milligram): Care Instructions  Overview     Limiting sodium can be an important part of managing some health problems.   The most common source of sodium is salt. People get most of the salt in their diet from canned, prepared, and packaged foods. Fast food and restaurant meals also are very high in sodium. Your doctor will probably limit your sodium to less than 2,000 milligrams (mg) a day. This limit counts all the sodium inprepared and packaged foods and any salt you add to your food. Follow-up care is a key part of your treatment and safety. Be sure to make and go to all appointments, and call your doctor if you are having problems. It's also a good idea to know your test results and keep alist of the medicines you take. How can you care for yourself at home? Read food labels  Read labels on cans and food packages. The labels tell you how much sodium is in each serving. Make sure that you look at the serving size. If you eat more than the serving size, you have eaten more sodium. Food labels also tell you the Percent Daily Value for sodium. Choose products with low Percent Daily Values for sodium. Be aware that sodium can come in forms other than salt, including monosodium glutamate (MSG), sodium citrate, and sodium bicarbonate (baking soda). MSG is often added to Asian food. When you eat out, you can sometimes ask for food without MSG or added salt. Buy low-sodium foods  Buy foods that are labeled \"unsalted\" (no salt added), \"sodium-free\" (less than 5 mg of sodium per serving), or \"low-sodium\" (140 mg or less of sodium per serving). Foods labeled \"reduced-sodium\" and \"light sodium\" may still have too much sodium. Be sure to read the label to see how much sodium you are getting. Buy fresh vegetables, or frozen vegetables without added sauces. Buy low-sodium versions of canned vegetables, soups, and other canned goods. Prepare low-sodium meals  Cut back on the amount of salt you use in cooking. This will help you adjust to the taste. Do not add salt after cooking. One teaspoon of salt has about 2,300 mg of sodium.   Take the salt shaker off the table.  Flavor your food with garlic, lemon juice, onion, vinegar, herbs, and spices. Do not use soy sauce, lite soy sauce, steak sauce, onion salt, garlic salt, celery salt, or ketchup on your food. Use low-sodium salad dressings, sauces, and ketchup. Or make your own salad dressings and sauces without adding salt. Use less salt (or none) when recipes call for it. You can often use half the salt a recipe calls for without losing flavor. Other foods such as rice, pasta, and grains do not need added salt. Rinse canned vegetables, and cook them in fresh water. This removes somebut not allof the salt. Avoid water that is naturally high in sodium or that has been treated with water softeners, which add sodium. If you buy bottled water, read the label and choose a sodium-free brand. Avoid high-sodium foods  Avoid eating:  Smoked, cured, salted, and canned meat, fish, and poultry. Ham, sweeney, hot dogs, and luncheon meats. Regular, hard, and processed cheese and regular peanut butter. Crackers with salted tops, and other salted snack foods such as pretzels, chips, and salted popcorn. Frozen prepared meals, unless labeled low-sodium. Canned and dried soups, broths, and bouillon, unless labeled sodium-free or low-sodium. Canned vegetables, unless labeled sodium-free or low-sodium. [place] fries, pizza, tacos, and other fast foods. Pickles, olives, ketchup, and other condiments, especially soy sauce, unless labeled sodium-free or low-sodium. Where can you learn more? Go to https://AgentekpeEvil City Blueseb.healthHypios. org and sign in to your Easy[employer] account. Enter A128 in the [facility] box to learn more about \"Low Sodium Diet (2,000 Milligram): Care Instructions. \"     If you do not have an account, please click on the \"Sign Up Now\" link. Current as of: September 8, 2021               Content Version: 13.2  © 5451-4786 Healthwise, Incorporated. Care instructions adapted under license by [facility]). If you have questions about a medical condition or this instruction, always ask your healthcare professional. [address] any warranty or liability for your use of this information. Patient Education        Learning About Low-Fat Eating  What is low-fat eating? Most food has some fat in it. Your body needs some fat to be healthy. But somekinds of fats are healthier than others. In a low-fat eating plan, you try to choose healthier fats and eat fewer unhealthy fats. Healthy fats include olive and canola oil. Try to avoid eatingtoo much saturated fat, such as in cheese and meats. You do not need to cut all fat from your diet. But you can make healthierchoices about the types and amount of fat you eat. Even though it is a good idea to choose healthier fats, it is still importantto be careful of how much fat you eat, because all fats are high in calories. What are the different types of fats? Unhealthy fat  Saturated fat. Saturated fats are mostly in animal foods, such as meat and dairy foods. Tropical oils, such as coconut oil, palm oil, and cocoa butter, are also saturated fats. Healthy fats  Monounsaturated fat. Monounsaturated fats are liquid at room temperature but get solid when refrigerated. Eating foods that are high in this fat may help lower your \"bad\" (LDL) cholesterol, keep your \"good\" (HDL) cholesterol level up, and lower your chances of getting coronary artery disease. This fat is found in canola oil, olive oil, peanut oil, olives, avocados, nuts, and nut butters. Polyunsaturated fat. Polyunsaturated fats are liquid at room temperature. They are in safflower, sunflower, and corn oils. They are also the main fat in seafood. Omega-3 fatty acids are types of polyunsaturated fat. Eating fish may lower your chances of getting coronary artery disease. Fatty fish such as salmon and mackerel contain these healthy fatty acids.  So do ground flaxseeds and flaxseed oil, soybeans, walnuts, and seeds. Why cut down on unhealthy fats? Eating foods that contain saturated fats can raise the LDL (\"bad\") cholesterol in your blood. Having a high level of LDL cholesterol increases your chance of hardening of the arteries (atherosclerosis), which can lead to heart disease,heart attack, and stroke. In general:  No more than 10% of your daily calories should come from saturated fat. This is about 20 grams in a 2,000-calorie diet. No more than 10% of your daily calories should come from polyunsaturated fat. This is about 20 grams in a 2,000-calorie diet. Monounsaturated fats can be up to 15% of your daily calories. This is about 25 to 30 grams in a 2,000-calorie diet. If you're not sure how much fat you should be eating or how many calories you need each day to stay at a healthy weight, talk to a registered dietitian. Adietitian can help you create a plan that's right for you. What can you do to cut down on fat? Foods like cheese, butter, sausage, and desserts can have a lot of unhealthyfats. Try these tips for healthier meals at home and when you eat out. At home  Fill up on fruits, vegetables, and whole grains. Think of meat as a side dish instead of as the main part of your meal.  When you do eat meat, make it extra-lean ground beef (97% lean), ground turkey breast (without skin added), meats with fat trimmed off before cooking, or skinless chicken. Try main dishes that use whole wheat pasta, brown rice, dried beans, or vegetables. Use cooking methods that use little or no fat, such as broiling, steaming, or grilling. Use cooking spray instead of oil. If you use oil, use a monounsaturated oil, such as canola or olive oil. Read food labels on canned, bottled, or packaged foods. Choose those with little saturated fat. When eating out at a restaurant  Order foods that are broiled or poached instead of fried or breaded.   Cut back on the amount of butter or margarine that you use on bread. Use small amounts of olive oil instead. Order sauces, gravies, and salad dressings on the side, and use only a little. When you order pasta, choose tomato sauce instead of cream sauce. Ask for salsa with your baked potato instead of sour cream, butter, cheese, or sweeney. Where can you learn more? Go to https://chmichelewmiguel.BandApp. org and sign in to your Archevos account. Enter S859 in the Subitec box to learn more about \"Learning About Low-Fat Eating. \"     If you do not have an account, please click on the \"Sign Up Now\" link. Current as of: September 8, 2021               Content Version: 13.2  © 2006-2022 Healthwise, [employer]. Care instructions adapted under license by [facility] ([facility]). If you have questions about a medical condition or this instruction, always ask your healthcare professional. [place] [address] any warranty or liability for your use of this information. Patient Education        Well Visit, Over 72: Care Instructions  Overview     Well visits can help you stay healthy. Your doctor has checked your overall health and may have suggested ways to take good care of yourself. Your doctor also may have recommended tests. At home, you can help prevent illness withhealthy eating, regular exercise, and other steps. Follow-up care is a key part of your treatment and safety. Be sure to make and go to all appointments, and call your doctor if you are having problems. It's also a good idea to know your test results and keep alist of the medicines you take. How can you care for yourself at home? Get screening tests that you and your doctor decide on. Screening helps find diseases before any symptoms appear. Eat healthy foods. Choose fruits, vegetables, whole grains, protein, and low-fat dairy foods. Limit fat, especially saturated fat. Reduce salt in your diet. Limit alcohol.  If you are a man, have no more than 2 drinks a day or 14 drinks a week. If you are a woman, have no more than 1 drink a day or 7 drinks a week. Since alcohol affects older adults differently, you may want to limit alcohol even more. Or you may not want to drink at all. Get at least 30 minutes of exercise on most days of the week. Walking is a good choice. You also may want to do other activities, such as running, swimming, cycling, or playing tennis or team sports. Reach and stay at a healthy weight. This will lower your risk for many problems, such as obesity, diabetes, heart disease, and high blood pressure. Do not smoke. Smoking can make health problems worse. If you need help quitting, talk to your doctor about stop-smoking programs and medicines. These can increase your chances of quitting for good. Care for your mental health. It is easy to get weighed down by worry and stress. Learn strategies to manage stress, like deep breathing and mindfulness, and stay connected with your family and community. If you find you often feel sad or hopeless, talk with your doctor. Treatment can help. Talk to your doctor about whether you have any risk factors for sexually transmitted infections (STIs). You can help prevent STIs if you wait to have sex with a new partner (or partners) until you've each been tested for STIs. It also helps if you use condoms (male or female condoms) and if you limit your sex partners to one person who only has sex with you. Vaccines are available for some STIs. If you think you may have a problem with alcohol or drug use, talk to your doctor. This includes prescription medicines (such as amphetamines and opioids) and illegal drugs (such as cocaine and methamphetamine). Your doctor can help you figure out what type of treatment is best for you. Protect your skin from too much sun. When you're outdoors from 10 a.m. to 4 p.m., stay in the shade or cover up with clothing and a hat with a wide brim. Wear sunglasses that block UV rays.  Even when it's cloudy, put broad-spectrum sunscreen (SPF 30 or higher) on any exposed skin. See a dentist one or two times a year for checkups and to have your teeth cleaned. Wear a seat belt in the car. When should you call for help? Watch closely for changes in your health, and be sure to contact your doctor if you have any problems or symptoms that concern you. Where can you learn more? Go to https://169 ST.pepiceweb.SenionLab. org and sign in to your WhipCar account. Enter F398 in the Dreamforge box to learn more about \"Well Visit, Over 65: Care Instructions. \"     If you do not have an account, please click on the \"Sign Up Now\" link. Current as of: October 6, 2021               Content Version: 13.2  © 2006-2022 [email or web address]. Care instructions adapted under license by [facility] ([facility]). If you have questions about a medical condition or this instruction, always ask your healthcare professional. [place] [address] any warranty or liability for your use of this information. Patient Education        Medicines to Avoid With Kidney Disease: Care Instructions  Overview     Kidney disease means that your kidneys are not able to get rid of waste from the blood. So they can't keep your body's fluids and chemicals in balance. Usually, the kidneys get rid of waste from the blood through the urine. Andthey balance the fluids in the body. When your kidneys don't work as they should, you have to be careful about some medicines. They may harm your kidneys. Your doctor may tell you not to takethem or may change the dose. Medicines for pain and swelling, such as ibuprofen (Advil or Motrin) or naproxen (Aleve), can cause harm. So can some antibiotics and antacids. And you need to be careful about some drugs that treat cancer, lower blood pressure, orget rid of water from the body. Some herbal products could cause harm too. Follow-up care is a key part of your treatment and safety.  Be sure to make and go to all appointments, and call your doctor if you are having problems. It's also a good idea to know your test results and keep alist of the medicines you take. How can you care for yourself at home? Tell your doctor all the prescription, herbal, or over-the-counter medicines you take. Do not take any new ones unless you talk to your doctor first.  Darby Rick not take anti-inflammatory medicines. These include ibuprofen (Advil, Motrin) and naproxen (Aleve). You can use acetaminophen (Tylenol) for pain. Do not take two or more pain medicines at the same time unless the doctor told you to. Many pain medicines have acetaminophen, which is Tylenol. Too much acetaminophen (Tylenol) can be harmful. Tell all doctors and others who work with your health care that you have kidney disease. Wear medical alert jewelry that lists your health problem. You can buy this at most drugstores. Where can you learn more? Go to https://Kayentis.Admify. org and sign in to your TermScout account. Enter H744 in the Corthera box to learn more about \"Medicines to Avoid With Kidney Disease: Care Instructions. \"     If you do not have an account, please click on the \"Sign Up Now\" link. Current as of: September 8, 2021               Content Version: 13.2  © 4093-4940 Healthwise, Incorporated. Care instructions adapted under license by [facility] ([facility]). If you have questions about a medical condition or this instruction, always ask your healthcare professional. [place] [address] any warranty or liability for your use of this information.

## 2022-06-23 ENCOUNTER — HOSPITAL ENCOUNTER (OUTPATIENT)
Age: 82
Discharge: HOME | End: 2022-06-23
Payer: MEDICARE

## 2022-06-23 DIAGNOSIS — I10 BENIGN ESSENTIAL HTN: ICD-10-CM

## 2022-06-23 DIAGNOSIS — N18.31 STAGE 3A CHRONIC KIDNEY DISEASE (HCC): ICD-10-CM

## 2022-06-23 DIAGNOSIS — I25.10 CORONARY ARTERY DISEASE INVOLVING NATIVE CORONARY ARTERY OF NATIVE HEART WITHOUT ANGINA PECTORIS: ICD-10-CM

## 2022-06-23 DIAGNOSIS — E78.2 HYPERLIPIDEMIA, MIXED: ICD-10-CM

## 2022-06-23 DIAGNOSIS — R73.01 IMPAIRED FASTING GLUCOSE: ICD-10-CM

## 2022-06-23 LAB
A/G RATIO: 1.3 (ref 1.1–2.2)
ALBUMIN SERPL-MCNC: 4.1 G/DL (ref 3.4–5)
ALP BLD-CCNC: 82 U/L (ref 40–129)
ALT SERPL-CCNC: 15 U/L (ref 10–40)
ANION GAP SERPL CALCULATED.3IONS-SCNC: 12 MMOL/L (ref 3–16)
AST SERPL-CCNC: 23 U/L (ref 15–37)
BASOPHILS ABSOLUTE: 0.1 K/UL (ref 0–0.2)
BASOPHILS RELATIVE PERCENT: 1.1 %
BILIRUB SERPL-MCNC: 0.4 MG/DL (ref 0–1)
BUN BLDV-MCNC: 27 MG/DL (ref 7–20)
CALCIUM SERPL-MCNC: 9.7 MG/DL (ref 8.3–10.6)
CHLORIDE BLD-SCNC: 101 MMOL/L (ref 99–110)
CHOLESTEROL, TOTAL: 254 MG/DL (ref 0–199)
CO2: 25 MMOL/L (ref 21–32)
CREAT SERPL-MCNC: 1 MG/DL (ref 0.6–1.2)
EOSINOPHILS ABSOLUTE: 0.2 K/UL (ref 0–0.6)
EOSINOPHILS RELATIVE PERCENT: 3.8 %
GFR AFRICAN AMERICAN: >60
GFR NON-AFRICAN AMERICAN: 53
GLUCOSE BLD-MCNC: 102 MG/DL (ref 70–99)
HCT VFR BLD CALC: 45 % (ref 36–48)
HDLC SERPL-MCNC: 41 MG/DL (ref 40–60)
HEMOGLOBIN: 15.4 G/DL (ref 12–16)
LDL CHOLESTEROL CALCULATED: ABNORMAL MG/DL
LDL CHOLESTEROL DIRECT: 143 MG/DL
LYMPHOCYTES ABSOLUTE: 1.3 K/UL (ref 1–5.1)
LYMPHOCYTES RELATIVE PERCENT: 27.3 %
MCH RBC QN AUTO: 33.3 PG (ref 26–34)
MCHC RBC AUTO-ENTMCNC: 34.3 G/DL (ref 31–36)
MCV RBC AUTO: 97.1 FL (ref 80–100)
MONOCYTES ABSOLUTE: 0.4 K/UL (ref 0–1.3)
MONOCYTES RELATIVE PERCENT: 8.5 %
NEUTROPHILS ABSOLUTE: 2.9 K/UL (ref 1.7–7.7)
NEUTROPHILS RELATIVE PERCENT: 59.3 %
PDW BLD-RTO: 13.8 % (ref 12.4–15.4)
PLATELET # BLD: 164 K/UL (ref 135–450)
PMV BLD AUTO: 9.7 FL (ref 5–10.5)
POTASSIUM SERPL-SCNC: 4.4 MMOL/L (ref 3.5–5.1)
RBC # BLD: 4.64 M/UL (ref 4–5.2)
SODIUM BLD-SCNC: 138 MMOL/L (ref 136–145)
TOTAL PROTEIN: 7.3 G/DL (ref 6.4–8.2)
TRIGL SERPL-MCNC: 366 MG/DL (ref 0–150)
TSH SERPL DL<=0.05 MIU/L-ACNC: 1.66 UIU/ML (ref 0.27–4.2)
VLDLC SERPL CALC-MCNC: ABNORMAL MG/DL
WBC # BLD: 4.8 K/UL (ref 4–11)

## 2022-06-23 PROCEDURE — 80061 LIPID PANEL: CPT

## 2022-06-23 PROCEDURE — 83036 HEMOGLOBIN GLYCOSYLATED A1C: CPT

## 2022-06-23 PROCEDURE — 85025 COMPLETE CBC W/AUTO DIFF WBC: CPT

## 2022-06-23 PROCEDURE — 80053 COMPREHEN METABOLIC PANEL: CPT

## 2022-06-23 PROCEDURE — 84443 ASSAY THYROID STIM HORMONE: CPT

## 2022-06-23 PROCEDURE — 36415 COLL VENOUS BLD VENIPUNCTURE: CPT

## 2022-06-24 LAB
ESTIMATED AVERAGE GLUCOSE: 119.8 MG/DL
HBA1C MFR BLD: 5.8 %

## 2022-06-27 DIAGNOSIS — M47.816 LUMBAR FACET ARTHROPATHY: Primary | ICD-10-CM

## 2022-06-27 DIAGNOSIS — G89.29 CHRONIC BILATERAL LOW BACK PAIN WITHOUT SCIATICA: ICD-10-CM

## 2022-06-27 DIAGNOSIS — M54.50 CHRONIC BILATERAL LOW BACK PAIN WITHOUT SCIATICA: ICD-10-CM

## 2022-06-27 RX ORDER — TRAMADOL HYDROCHLORIDE 50 MG/1
TABLET ORAL
Qty: 360 TABLET | Refills: 0 | Status: SHIPPED | OUTPATIENT
Start: 2022-06-27 | End: 2022-09-25

## 2022-06-30 ASSESSMENT — ENCOUNTER SYMPTOMS
FACIAL SWELLING: 0
BLOOD IN STOOL: 0
ABDOMINAL DISTENTION: 0
BACK PAIN: 0
CHEST TIGHTNESS: 0
COLOR CHANGE: 0
EYE DISCHARGE: 0
WHEEZING: 0
ABDOMINAL PAIN: 0
VOMITING: 0
COUGH: 0

## 2022-06-30 NOTE — PROGRESS NOTES
[facility]     Outpatient Cardiology         Chief Complaint   Patient presents with    Coronary Artery Disease    Hypertension       HPI      Jenjohnathon Worthington a 81 y.o. female here for follow up for CAD/HTN/HLD. CAD, asymptomatic. Needs better LDL. Transition to Hilda Apgar. Continue aspirin. Hypertension, not controlled today. We will adjust medications  Hyperlipidemia, LDL needs to be better. Transition to Hilda Apgar. Otherwise asymptomatic from a cardiovascular perspective. No symptoms concerning for angina or heart failure.     PMH  Past Medical History:   Diagnosis Date    Acquired spondylolisthesis of lumbosacral region     Bilateral primary osteoarthritis of hip     CAD (coronary artery disease)     one stent in heart    Chronic bilateral low back pain without sciatica 2018    CKD (chronic kidney disease), stage III (HCC)     DDD (degenerative disc disease), lumbar     Dr. Geremias Cordero GERD (gastroesophageal reflux disease)     Headache(784.0)     Heel spur 2016    History of lumbar spinal fusion     Hyperlipidemia     Hypertension     IBS (irritable bowel syndrome)     Lyme disease     suspected    Medical history reviewed with no changes     MVP (mitral valve prolapse)     Neuroma nos     Osteoarthritis     Osteopenia     Post-laminectomy syndrome     Rotator cuff tear     Dr. Mal Martinez    Squamous cell cancer of skin of right hand 2018    squamous cell carcinoma    Stress incontinence, female     Urinary incontinence        PSH  Past Surgical History:   Procedure Laterality Date    BREAST SURGERY      cyst removal    COLONOSCOPY  2006    CORONARY ANGIOPLASTY WITH STENT PLACEMENT      x1    HC INJECTION PROCEDURE FOR SACROILIAC JOINT Bilateral 2020    BILATERAL SACROILIAC JOINT INJECTION WITH FLUOROSCOPY performed by Cheko Carmona MD at [address] [place] [address]  14    LUMBAR 4-5 DECOMPRESSIVE LAMINECTOMY AND POSTERIOR LATERAL FUSION LUMBAR 4-5 AND PEDICLE SCREW FIXATION     LAMINECTOMY POSTERIOR THORACIC / LUMBAR Right 4/9/2019    THORACIC LAMINECTOMY WITH PADDLE LEAD AND RECHARGEBLE BATTERY IN RIGHT HIP performed by Summer Benz MD at [address] [facility]  10/02/2019    R nasal sidewall    IN ARTHROCENTESIS ASPIR&/INJ INTERM JT/BURS W/O US N/A 10/24/2018    MIDLINE CAUDEL INJECTION WITH FLUOROSCOPY performed by Meg Smith MD at [address] [facility]  2002    stent, LAD    SKIN CANCER EXCISION Right 05/24/2018    Right dorsal hand, Dr. Sukumar Donovan N/A 3/11/2019    SPINAL CORD STIMULATOR IMPLANT TRIAL WITH FLUOROSCOPY WITH NEVRO performed by Meg Smith MD at [address] [facility]      age 10        Social HIstory  Social History     Tobacco Use    Smoking status: Never Smoker    Smokeless tobacco: Never Used   Vaping Use    Vaping Use: Never used   Substance Use Topics    Alcohol use: No    Drug use: Never       Family History  Family History   Problem Relation Age of Onset    Diabetes Brother     Diabetes Mother     Heart Disease Mother     Heart Disease Father     Cancer Father         skin       Allergies   Allergies   Allergen Reactions    Aquaphor [Aquaphilic]     Other     Statins Other (See Comments)     Severe muscle pain    Adhesive Tape Itching and Rash     Pt Okay with paper tape       Medications:     Home Medications:  Were reviewed and are listed in nursing record. and/or listed below    Prior to Admission medications    Medication Sig Start Date End Date Taking?  Authorizing Provider   bisoprolol-hydroCHLOROthiazide [facility]) 10-6.25 MG per tablet Take 2 tablets by mouth daily 7/14/22  Yes Christal Glynn MD   traMADol (ULTRAM) 50 MG tablet TAKE 1 TABLET EVERY 6 HOURS AS NEEDED FOR PAIN, REDUCE DOSES TAKEN AS PAIN BECOMES MANAGEABLE 6/27/22 9/25/22 Yes Hill James,    Turmeric 450 MG CAPS Take 450 mg by mouth daily   Yes Historical Provider, MD   gabapentin (NEURONTIN) 300 MG capsule TAKE 1 CAPSULE BY MOUTH EVERY NIGHT 5/17/22 8/15/22 Yes Hill James DO   REPATHA SURECLICK 447 MG/ML SOAJ INJECT 140MG UNDER THE SKIN EVERY 30 DAYS 5/16/22  Yes Renetta Jordan MD   hydroCHLOROthiazide (MICROZIDE) 12.5 MG capsule TAKE 1 CAPSULE DAILY 9/18/21  Yes Hill James DO   irbesartan (AVAPRO) 300 MG tablet TAKE 1 TABLET DAILY 9/16/21  Yes Renetta Jordan MD   vitamin E 400 UNIT capsule Take 400 Units by mouth nightly   Yes Historical Provider, MD   aspirin 81 MG tablet Take 81 mg by mouth daily   Yes Historical Provider, MD   Multiple Vitamins-Minerals (THERAPEUTIC MULTIVITAMIN-MINERALS) tablet Take 1 tablet by mouth daily   Yes Historical Provider, MD   acetaminophen (TYLENOL) 325 MG tablet Take 650 mg by mouth every 6 hours as needed for Pain   Yes Historical Provider, MD   melatonin (RA MELATONIN) 3 MG TABS tablet Take 1 tablet by mouth nightly. 9/17/14  Yes Hill James DO   Misc Natural Products (OSTEO BI-FLEX TRIPLE STRENGTH PO) Take 1 tablet by mouth 2 times daily    Yes Historical Provider, MD        Review of Systems   Constitutional: Negative for activity change, appetite change, diaphoresis, fatigue, fever and unexpected weight change. HENT: Negative for congestion, facial swelling, mouth sores and nosebleeds. Eyes: Negative for discharge and visual disturbance. Respiratory: Negative for cough, chest tightness, shortness of breath and wheezing. Cardiovascular: Negative for chest pain, palpitations and leg swelling. Gastrointestinal: Negative for abdominal distention, abdominal pain, blood in stool and vomiting. Endocrine: Negative for cold intolerance, heat intolerance and polyuria. Genitourinary: Negative for difficulty urinating, dysuria, frequency and hematuria. Musculoskeletal: Negative for back pain, joint swelling, myalgias and neck pain.    Skin: Negative for color change, pallor and rash. Allergic/Immunologic: Negative for immunocompromised state. Neurological: Negative for dizziness, syncope, weakness, light-headedness, numbness and headaches. Hematological: Negative for adenopathy. Does not bruise/bleed easily. Psychiatric/Behavioral: Negative for behavioral problems, confusion, decreased concentration and suicidal ideas. The patient is not nervous/anxious. Vitals:    07/14/22 1433   BP: (!) 146/92   Pulse: 68    Weight: 203 lb 9.6 oz (92.4 kg)       Vitals:    07/14/22 1425 07/14/22 1433   BP: (!) 170/100 (!) 146/92   Site: Left Upper Arm    Position: Sitting    Cuff Size: Medium Adult    Pulse: 70 68   Weight: 203 lb 9.6 oz (92.4 kg)        BP Readings from Last 3 Encounters:   07/14/22 (!) 146/92   06/20/22 138/82   12/13/21 (!) 140/92       Wt Readings from Last 3 Encounters:   07/14/22 203 lb 9.6 oz (92.4 kg)   06/20/22 202 lb 3.2 oz (91.7 kg)   12/13/21 194 lb (88 kg)       Physical Exam  Constitutional:       General: She is not in acute distress. Appearance: She is well-developed. She is not diaphoretic. HENT:      Head: Normocephalic and atraumatic. Eyes:      Pupils: Pupils are equal, round, and reactive to light. Neck:      Thyroid: No thyromegaly. Vascular: No JVD. Cardiovascular:      Rate and Rhythm: Normal rate and regular rhythm. Chest Wall: PMI is not displaced. Heart sounds: Normal heart sounds, S1 normal and S2 normal. No murmur heard. No friction rub. No gallop. Pulmonary:      Effort: Pulmonary effort is normal. No respiratory distress. Breath sounds: Normal breath sounds. No stridor. No wheezing or rales. Chest:      Chest wall: No tenderness. Abdominal:      General: Bowel sounds are normal. There is no distension. Palpations: Abdomen is soft. Tenderness: There is no abdominal tenderness. There is no guarding or rebound. Musculoskeletal:         General: No tenderness.  Normal range of motion. Cervical back: Normal range of motion. Lymphadenopathy:      Cervical: No cervical adenopathy. Skin:     General: Skin is warm and dry. Findings: No erythema or rash. Neurological:      Mental Status: She is alert and oriented to person, place, and time. Coordination: Coordination normal.   Psychiatric:         Behavior: Behavior normal.         Thought Content:  Thought content normal.         Judgment: Judgment normal.         Labs:       Lab Results   Component Value Date    WBC 4.8 06/23/2022    HGB 15.4 06/23/2022    HCT 45.0 06/23/2022    MCV 97.1 06/23/2022     06/23/2022     Lab Results   Component Value Date     06/23/2022    K 4.4 06/23/2022     06/23/2022    CO2 25 06/23/2022    BUN 27 (H) 06/23/2022    CREATININE 1.0 06/23/2022    GLUCOSE 102 (H) 06/23/2022    CALCIUM 9.7 06/23/2022    PROT 7.3 06/23/2022    LABALBU 4.1 06/23/2022    BILITOT 0.4 06/23/2022    ALKPHOS 82 06/23/2022    AST 23 06/23/2022    ALT 15 06/23/2022    LABGLOM 53 (A) 06/23/2022    GFRAA >60 06/23/2022    AGRATIO 1.3 06/23/2022    GLOB 3.2 07/15/2021         Lab Results   Component Value Date    CHOL 254 (H) 06/23/2022    CHOL 221 (H) 12/13/2021    CHOL 188 07/15/2021     Lab Results   Component Value Date    TRIG 366 (H) 06/23/2022    TRIG 383 (H) 12/13/2021    TRIG 265 (H) 07/15/2021     Lab Results   Component Value Date    HDL 41 06/23/2022    HDL 53 12/13/2021    HDL 46 07/15/2021     Lab Results   Component Value Date    LDLCALC see below 06/23/2022 [address] see below 12/13/2021    [address] 89 07/15/2021     Lab Results   Component Value Date    LABVLDL see below 06/23/2022    LABVLDL see below 12/13/2021    LABVLDL 53 07/15/2021     No results found for: [facility]    Lab Results   Component Value Date    INR 0.99 04/09/2019    INR 0.90 03/11/2019    INR 0.89 05/13/2014    PROTIME 11.3 04/09/2019    PROTIME 10.3 03/11/2019    PROTIME 10.0 05/13/2014       The ASCVD Risk score (Everardo Rob, et al., 2013) failed to calculate for the following reasons: The 2013 ASCVD risk score is only valid for ages 36 to 78      Imaging:       Last ECG (if available):  NSR, old ant MI, left bundle branch block    Last Stress (if available): 3/19/20   Summary    There is normal isotope uptake at stress and rest. There is no evidence of    myocardial ischemia or scar.    Left ventricular ejection fraction of 71 %.    Overall findings represent a low risk scan. 12/8/14   Summary    Left ventricular ejection fraction of 74 %.    The LV wall motion is normal.    There is normal isotope uptake at stress and rest. There is no evidence of    myocardial ischemia or scar. Last Cath (if available):    Last TTE/ALANNAH(if available): 4/4/19   Summary   Left ventricular cavity size is normal. There is moderate concentric left   ventricular hypertrophy. Overall left ventricular systolic function appears   low normal with an ejection fraction of 50-55%. Abnormal septal motion is   present. Normal diastolic function. Mild tricuspid regurgitation. Estimated   pulmonary artery systolic pressure is at 32 mmHg assuming a right atrial   pressure of 3 mmHg. No prior echo for comparison. Last CMR  (if available):      Assessment / Plan:     Coronary artery disease involving native coronary artery of native heart without angina pectoris  Continue aspirin. Needs better LDL control. Transition to [employer]. Benign essential HTN  BP uncontrolled. Double up on Ziac  Continue Avapro    Hyperlipidemia, mixed  LDL is up. Transition to Leqvio    Follow up in 12 months. I had the opportunity to review the clinical symptoms and presentation of Cleave Blizzard. Patient's allergies and medications were reviewed and updated. Patient's past medical, surgical, social and family history were reviewed and updated. Patient's testing including laboratory, ECGs, monitor, imaging (TTE,ALANNAH,CMR,cath) were reviewed. All questions and concerns were addressed to the patient/family. Alternatives to my treatment were discussed. The note was completed using EMR. Every effort was made to ensure accuracy; however, inadvertent computerized transcription errors may be present. Thank you for allowing me to participate in the care or [address]  Robert Ratliff MD, [facility]

## 2022-07-14 ENCOUNTER — OFFICE VISIT (OUTPATIENT)
Dept: CARDIOLOGY CLINIC | Age: 82
End: 2022-07-14
Payer: MEDICARE

## 2022-07-14 VITALS
DIASTOLIC BLOOD PRESSURE: 92 MMHG | SYSTOLIC BLOOD PRESSURE: 146 MMHG | HEART RATE: 68 BPM | WEIGHT: 203.6 LBS | BODY MASS INDEX: 34.95 KG/M2

## 2022-07-14 DIAGNOSIS — I25.10 CORONARY ARTERY DISEASE INVOLVING NATIVE CORONARY ARTERY OF NATIVE HEART WITHOUT ANGINA PECTORIS: Primary | ICD-10-CM

## 2022-07-14 DIAGNOSIS — E78.2 HYPERLIPIDEMIA, MIXED: ICD-10-CM

## 2022-07-14 DIAGNOSIS — I10 BENIGN ESSENTIAL HTN: ICD-10-CM

## 2022-07-14 PROCEDURE — G8399 PT W/DXA RESULTS DOCUMENT: HCPCS | Performed by: INTERNAL MEDICINE

## 2022-07-14 PROCEDURE — 1123F ACP DISCUSS/DSCN MKR DOCD: CPT | Performed by: INTERNAL MEDICINE

## 2022-07-14 PROCEDURE — G8417 CALC BMI ABV UP PARAM F/U: HCPCS | Performed by: INTERNAL MEDICINE

## 2022-07-14 PROCEDURE — 1036F TOBACCO NON-USER: CPT | Performed by: INTERNAL MEDICINE

## 2022-07-14 PROCEDURE — 1090F PRES/ABSN URINE INCON ASSESS: CPT | Performed by: INTERNAL MEDICINE

## 2022-07-14 PROCEDURE — G8427 DOCREV CUR MEDS BY ELIG CLIN: HCPCS | Performed by: INTERNAL MEDICINE

## 2022-07-14 PROCEDURE — 99214 OFFICE O/P EST MOD 30 MIN: CPT | Performed by: INTERNAL MEDICINE

## 2022-07-14 PROCEDURE — 93000 ELECTROCARDIOGRAM COMPLETE: CPT | Performed by: INTERNAL MEDICINE

## 2022-07-14 RX ORDER — BISOPROLOL FUMARATE AND HYDROCHLOROTHIAZIDE 10; 6.25 MG/1; MG/1
2 TABLET ORAL DAILY
Qty: 180 TABLET | Refills: 3 | Status: SHIPPED | OUTPATIENT
Start: 2022-07-14

## 2022-07-14 RX ORDER — BISOPROLOL FUMARATE AND HYDROCHLOROTHIAZIDE 10; 6.25 MG/1; MG/1
2 TABLET ORAL DAILY
Qty: 180 TABLET | Refills: 3 | Status: SHIPPED | OUTPATIENT
Start: 2022-07-14 | End: 2022-07-14

## 2022-07-14 ASSESSMENT — ENCOUNTER SYMPTOMS: SHORTNESS OF BREATH: 0

## 2022-07-25 RX ORDER — INCLISIRAN 284 MG/1.5ML
INJECTION, SOLUTION SUBCUTANEOUS
Qty: 1.5 ML | Refills: 3
Start: 2022-07-25 | End: 2022-10-20

## 2022-07-29 ENCOUNTER — TELEPHONE (OUTPATIENT)
Dept: CARDIOLOGY CLINIC | Age: 82
End: 2022-07-29

## 2022-07-29 NOTE — TELEPHONE ENCOUNTER
Patient confirms last dose repatha = 7/26; per dr Marylee Hays, start leqvio one month after last dose repatha. Spoke w/Pamela Negro, ph: [phone] aware of all .

## 2022-08-22 RX ORDER — GABAPENTIN 300 MG/1
CAPSULE ORAL
Qty: 90 CAPSULE | Refills: 3 | Status: SHIPPED | OUTPATIENT
Start: 2022-08-22 | End: 2023-08-22

## 2022-08-22 RX ORDER — IRBESARTAN 300 MG/1
TABLET ORAL
Qty: 90 TABLET | Refills: 3 | Status: SHIPPED | OUTPATIENT
Start: 2022-08-22

## 2022-08-22 NOTE — TELEPHONE ENCOUNTER
Requested Prescriptions     Pending Prescriptions Disp Refills    irbesartan (AVAPRO) 300 MG tablet [Pharmacy Med Name: IRBESARTAN TABS 300MG] 90 tablet 3     Sig: TAKE 1 TABLET DAILY          N    Last Office Visit: 7/22/2021     Next Office Visit: [phone]

## 2022-09-19 RX ORDER — HYDROCHLOROTHIAZIDE 12.5 MG/1
CAPSULE, GELATIN COATED ORAL
Qty: 90 CAPSULE | Refills: 3 | Status: SHIPPED | OUTPATIENT
Start: 2022-09-19

## 2022-10-06 ENCOUNTER — TELEPHONE (OUTPATIENT)
Dept: INTERNAL MEDICINE CLINIC | Age: 82
End: 2022-10-06

## 2022-10-06 NOTE — TELEPHONE ENCOUNTER
Patient is requesting a PreOp appointment for cataract surgery on 10/28 with Dr. Agusto Contreras. Patient is asking to be scheduled on 10/17 or 10/18. Only same day available on those days. Please advise.

## 2022-10-20 ENCOUNTER — OFFICE VISIT (OUTPATIENT)
Dept: INTERNAL MEDICINE CLINIC | Age: 82
End: 2022-10-20
Payer: MEDICARE

## 2022-10-20 ENCOUNTER — TELEPHONE (OUTPATIENT)
Dept: INTERNAL MEDICINE CLINIC | Age: 82
End: 2022-10-20

## 2022-10-20 ENCOUNTER — TELEPHONE (OUTPATIENT)
Dept: CARDIOLOGY CLINIC | Age: 82
End: 2022-10-20

## 2022-10-20 VITALS
WEIGHT: 203.8 LBS | HEIGHT: 64 IN | SYSTOLIC BLOOD PRESSURE: 152 MMHG | RESPIRATION RATE: 16 BRPM | DIASTOLIC BLOOD PRESSURE: 80 MMHG | BODY MASS INDEX: 34.79 KG/M2 | OXYGEN SATURATION: 97 % | HEART RATE: 65 BPM

## 2022-10-20 DIAGNOSIS — I25.10 CORONARY ARTERY DISEASE INVOLVING NATIVE CORONARY ARTERY OF NATIVE HEART WITHOUT ANGINA PECTORIS: ICD-10-CM

## 2022-10-20 DIAGNOSIS — R73.01 IMPAIRED FASTING GLUCOSE: ICD-10-CM

## 2022-10-20 DIAGNOSIS — H26.9 CATARACT OF BOTH EYES, UNSPECIFIED CATARACT TYPE: ICD-10-CM

## 2022-10-20 DIAGNOSIS — Z01.818 PREOP EXAM FOR INTERNAL MEDICINE: Primary | ICD-10-CM

## 2022-10-20 DIAGNOSIS — N18.31 STAGE 3A CHRONIC KIDNEY DISEASE (HCC): ICD-10-CM

## 2022-10-20 DIAGNOSIS — E78.2 HYPERLIPIDEMIA, MIXED: ICD-10-CM

## 2022-10-20 DIAGNOSIS — I10 BENIGN ESSENTIAL HTN: ICD-10-CM

## 2022-10-20 DIAGNOSIS — G89.29 CHRONIC BILATERAL LOW BACK PAIN WITHOUT SCIATICA: ICD-10-CM

## 2022-10-20 DIAGNOSIS — M54.50 CHRONIC BILATERAL LOW BACK PAIN WITHOUT SCIATICA: ICD-10-CM

## 2022-10-20 PROCEDURE — 1036F TOBACCO NON-USER: CPT | Performed by: INTERNAL MEDICINE

## 2022-10-20 PROCEDURE — G8484 FLU IMMUNIZE NO ADMIN: HCPCS | Performed by: INTERNAL MEDICINE

## 2022-10-20 PROCEDURE — G8399 PT W/DXA RESULTS DOCUMENT: HCPCS | Performed by: INTERNAL MEDICINE

## 2022-10-20 PROCEDURE — 1090F PRES/ABSN URINE INCON ASSESS: CPT | Performed by: INTERNAL MEDICINE

## 2022-10-20 PROCEDURE — G8417 CALC BMI ABV UP PARAM F/U: HCPCS | Performed by: INTERNAL MEDICINE

## 2022-10-20 PROCEDURE — G8427 DOCREV CUR MEDS BY ELIG CLIN: HCPCS | Performed by: INTERNAL MEDICINE

## 2022-10-20 PROCEDURE — 99214 OFFICE O/P EST MOD 30 MIN: CPT | Performed by: INTERNAL MEDICINE

## 2022-10-20 PROCEDURE — 1123F ACP DISCUSS/DSCN MKR DOCD: CPT | Performed by: INTERNAL MEDICINE

## 2022-10-20 NOTE — TELEPHONE ENCOUNTER
Pt is scheduled to have cataract surgery on 10/28 by Babita Giron at [facility]. They don't need her to come off asa. Last OV 7/13/22  Last ekg : 7/13/22  Last stress 2020    Phone: [phone]  Fax: [phone]       Okay to clear ?

## 2022-10-20 NOTE — TELEPHONE ENCOUNTER
[facility] with [facility] is calling because Dr. Jagdish Way would not sign off on pre-op until patient got Cardiac Clearance/[facility] has call in to get that at this time but they do not feel that they are going to be able to get the ECHO before the surgery. Would he be okay with just the Cardiac Clearance? Please call [facility] to let her know at number provided.

## 2022-10-20 NOTE — TELEPHONE ENCOUNTER
Spoke with pt and left message for this Tosin Graver. Not sure I understand why they want cardiac clearance for this low risk procedure. Pt needs to have the procedure within 30 days or she will need to return for another pre-op. I asked her to schedule an appointment with Dr. Rossana Hancock for Cardiac clearance. I do not believe she needs additional testing as she is asymptomatic.

## 2022-10-20 NOTE — PROGRESS NOTES
[facility]) Physicians  Internal Medicine  Patient Encounter  Indira Kirby D.O., [facility]          Chief Complaint   Patient presents with    Pre-op Exam     Cataract surgery 10/28 Left eye, 11/11 right eye @ [facility] Dr. Juarez Gtz       HPI-- 81 y.o. female presents today for a preoperative physical.  Pt diagnosed with bilateral cataracts. Patient has been struggling with blurry vision over the last 6 months. Her cataracts have been followed for several years. She also struggles with night vision though she does not drive much. She is also having trouble reading and watching TV. Pt is now scheduled for bilateral cataract removal with lens implants. I have been asked to see patient for pre-operative risk assessment and clearance. Surgery for the left eye is scheduled for 10/28/2022 by Dr. Juarez Gtz. The left eye will follow on 11/11/2022. CAD--  She is under the care of Dr. Janie Britt. H/O PTCA with stent. Patient denies any new problems with chest pain or tightness, shortness of breath, orthopnea, worsening swelling, new or unusual fatigue, palpitations, or syncopal episode. She does not want to take Leqvio due to side effects. Ken Osmel did not get her LDL to goal.      CKD--Patient denies any problems with foamy or frothy urine. She denies any hematuria, dysuria or worsening swelling. Lab Results   Component Value Date    BUN 27 (H) 06/23/2022      Lab Results   Component Value Date    CREATININE 1.0 06/23/2022      HTN--patient denies any symptoms suggestive of accelerated blood pressures. She denies any new headaches, dizziness, episodes of unilateral weakness, or speech disturbances. She has never had a stroke or TIA. Hyperlipidemia:    Lab Results   Component Value Date    LDLCALC see below 06/23/2022   Pt is statin intolerant. She does try to eat healthy. Pt is intolerant to statins. She states she did not tolerate even 1 dose of Leqvio. Ken Osmel did not lower the LDL enough.  She has also not tolerated Zetia.          Medical/Surgical Histories     Past Medical History:   Diagnosis Date    Acquired spondylolisthesis of lumbosacral region     Bilateral primary osteoarthritis of hip     CAD (coronary artery disease)     one stent in heart    Chronic bilateral low back pain without sciatica 2/8/2018    CKD (chronic kidney disease), stage III (HCC)     DDD (degenerative disc disease), lumbar     Dr. Carrie Beavers    GERD (gastroesophageal reflux disease)     Headache(784.0)     Heel spur 08/2016    History of lumbar spinal fusion     Hyperlipidemia     Hypertension     IBS (irritable bowel syndrome)     Lyme disease 2009    suspected    Medical history reviewed with no changes     MVP (mitral valve prolapse)     Neuroma nos     Osteoarthritis     Osteopenia     Post-laminectomy syndrome     Rotator cuff tear     Dr. Mauricio Dhal    Squamous cell cancer of skin of right hand 05/2018    squamous cell carcinoma    Stress incontinence, female     Urinary incontinence       No prior H/O DVT, PE or bleeding dyscrasias    Past Surgical History:   Procedure Laterality Date    BREAST SURGERY      cyst removal    COLONOSCOPY  2006    CORONARY ANGIOPLASTY WITH STENT PLACEMENT      x1    HC INJECTION PROCEDURE FOR SACROILIAC JOINT Bilateral 7/17/2020    BILATERAL SACROILIAC JOINT INJECTION WITH FLUOROSCOPY performed by Milton Fong MD at [address] [place] [address]  5/19/14    LUMBAR 4-5 DECOMPRESSIVE LAMINECTOMY AND POSTERIOR LATERAL FUSION LUMBAR 4-5 AND PEDICLE SCREW FIXATION     LAMINECTOMY POSTERIOR THORACIC / LUMBAR Right 4/9/2019    THORACIC LAMINECTOMY WITH PADDLE LEAD AND RECHARGEBLE BATTERY IN RIGHT HIP performed by Eboni Stearns MD at [address] [place] [address]  10/02/2019    R nasal sidewall    NE ARTHROCENTESIS ASPIR&/INJ INTERM JT/BURS W/O US N/A 10/24/2018    MIDLINE CAUDEL INJECTION WITH FLUOROSCOPY performed by Milton Fong MD at [place] [address]  2002    stent, LAD    SKIN CANCER EXCISION Right 05/24/2018    Right dorsal hand, Dr. Banda Carrier N/A 3/11/2019    [address] [place] [address] performed by Johnny Hutchinson MD at [address]      age 10       No reported problems with anesthesia     Medications/Allergies     Current Outpatient Medications   Medication Sig    hydroCHLOROthiazide (MICROZIDE) 12.5 MG capsule TAKE 1 CAPSULE DAILY    irbesartan (AVAPRO) 300 MG tablet TAKE 1 TABLET DAILY    gabapentin (NEURONTIN) 300 MG capsule TAKE 1 CAPSULE BY MOUTH EVERY NIGHT    bisoprolol-hydroCHLOROthiazide (ZIAC) 10-6.25 MG per tablet Take 2 tablets by mouth daily    Turmeric 450 MG CAPS Take 450 mg by mouth daily    vitamin E 400 UNIT capsule Take 400 Units by mouth nightly    aspirin 81 MG tablet Take 81 mg by mouth daily    Multiple Vitamins-Minerals (THERAPEUTIC MULTIVITAMIN-MINERALS) tablet Take 1 tablet by mouth daily    acetaminophen (TYLENOL) 325 MG tablet Take 650 mg by mouth every 6 hours as needed for Pain    melatonin 3 MG TABS tablet Take 1 tablet by mouth nightly. Misc Natural Products (OSTEO BI-FLEX TRIPLE STRENGTH PO) Take 1 tablet by mouth 2 times daily     Inclisiran Sodium (LEQVIO) 284 MG/1.5ML SOSY Initiate, 3 months, then every 6 months (Patient not taking: Reported on 10/20/2022)     No current facility-administered medications for this visit.         Allergies   Allergen Reactions    Aquaphor [Aquaphilic]     Other     Statins Other (See Comments)     Severe muscle pain    Adhesive Tape Itching and Rash     Pt Okay with paper tape         Substance Use History     Social History     Tobacco Use    Smoking status: Never    Smokeless tobacco: Never   Vaping Use    Vaping Use: Never used   Substance Use Topics    Alcohol use: No    Drug use: Never          Family History     Family History   Problem Relation Age of Onset    Diabetes Brother     Diabetes Mother     Heart Disease Mother     Heart Disease Father     Cancer Father         skin        No family history of problems with general anesthesia, venous thrombosis, or bleeding dyscrasias. REVIEW OF SYSTEMS:    CONSTITUTIONAL:  Neg   Recent weight changes, fatigue, fever, chills or night sweats, anorexia, Sleep difficulties  EYES: As per HPI  EARS:  Neg Hearing loss, tinnitus, vertigo, discharge or otalgia. NOSE:  Neg  Rhinorrhea, sneezing, itching, allergy, epistaxis, or snoring  MOUTH/THROAT:  Neg Bleeding gums, hoarseness, sore throat, dysphagia, throat infections, or dentures  RESPIRATORY:  Neg SOB ,wheeze, cough, sputum, hemoptysis. No report of + TB test.    CARDIOVASCULAR:  Neg Chest pain, palpitations, heart murmur,  orthopnea, paroxysmal nocturnal dyspnea or edema of extremities, or claudication.  + ZAVALA, no worse than usual.    GASTROINTESTINAL:  Neg   Nausea, vomiting, or diarrhea, constipation hematemesis, heart burn, dysphagia,change in bowel movements or stool caliber, hematochezia, melena, abdominal pain  GENITOURINARY:  Neg  Urinary frequency, hesitancy, urgency, polyuria, dysuria, hematuria, nocturia, incontinence, change in stream, genital pain or swelling, kidney stones  HEMATOLOGIC/LYMPHATIC:  Neg  Anemia, bleeding dyscrasias, easy bruising, blood clots (DVT/PE), transfusions, or enlarged lymph nodes  MUSCULOSKELETAL:  + BL Low back pain and Hip pain. NEUROLOGICAL:  Neg  Loss of Consciousness, memeory loss or forgetfulness, confusion, difficulty concentrating, seizures, insomina, aphasia or dysarthria unilateral weakness or paresthesias, ataxia, headaches, syncope, tremor, or H/O head trauma. PSYCHIATRIC:  Neg  Depression, anxiety  SKIN :  Neg  Rash, nail changes, sun burns, tattoos, change in moles, or skin color changes  ENDOCRINE:  Neg  Polydipsia,polyuria,abnormal weight changes,heat /cold intolerance, Hair changes. No H/O Diabetes or Thyroid disease.        Physical Exam    Vitals:    10/20/22 1058 10/20/22 1132   BP: (!) 156/80 (!) 152/80   Position:  Sitting   Cuff Size:  Large Adult   Pulse: 65    Resp: 16    SpO2: 97%    Weight: 203 lb 12.8 oz (92.4 kg)    Height: 5' 4\" (1.626 m)      Body mass index is 34.98 kg/m². Wt Readings from Last 3 Encounters:   10/20/22 203 lb 12.8 oz (92.4 kg)   07/14/22 203 lb 9.6 oz (92.4 kg)   06/20/22 202 lb 3.2 oz (91.7 kg)     BP Readings from Last 3 Encounters:   10/20/22 (!) 152/80   07/14/22 (!) 146/92   06/20/22 138/82        GEN:  81 y.o. female who is in NAD, A&O. She appears stated age and well nourished. She is cooperative and pleasant. HEAD:  NC/AT, no lesions. EYES:  KANG, EOMI, No scleral icterus or conjunctival injection or discharge.  + OU cataracts, + OU Ptosis. EARS:  EAC's clear, TM's normal.  NOSE:  Nasal cavity is clear. No mucosal congestion or discharge. Sinuses are nontender. MOUTH & THROAT:  Oral cavity is clear without mucosal lesions. Tongue is midline. Dentition is in good repair. No pharyngeal erythema or exudate. NECK:  Supple. Full ROM. Trachea is midline. No increased JVD. No thyromegaly or nodules. No masses  LYMPH: No C/SC/A/F nodes  CARDIAC:  S1S2 NL. Regular rhythm. No murmur/clicks/rubs. No ectopy. VASC:  Pedal pulses 2/4. Carotid upstrokes 2+. No bruits noted. PULM:  Lungs are CTA. Symmetric breath sounds noted. AP Diameter NL. GI:  Abdomen is soft and nontender. No distension. No organomegaly. No masses. No pulsatile masses. EXT:  No Cyanosis or clubbing. No edema. SKIN: Warm and dry, normal turgor, no rash or lesions of concern. NEURO:  No focal deficits. PSYCH:  Mood and affect NL. Judgement and insight NL. Encounter Diagnoses   Name Primary?     Preop exam for internal medicine Yes    Cataract of both eyes, unspecified cataract type     Coronary artery disease involving native coronary artery of native heart without angina pectoris     Benign essential HTN     Stage 3a chronic kidney disease (HCC)     Hyperlipidemia, mixed     Impaired fasting glucose     Chronic bilateral low back pain without sciatica        Plan:  Acceptable risk for the planned procedure. There are no contraindications at this time.   Patient may proceed with surgery    EKG--Not needed for this procedure  Lab-- Not needed for this procedure    Patient okay to continue aspirin   Pt will avoid NSAID's, OTC vitamin supplements and fish oil 1 week prior to procedure            Electronically Signed:  Vianey Fulton D.O      Copy of H&P given to pt and sent to [facility]

## 2022-10-20 NOTE — LETTER
[facility]  [address] [facility]  Phone: [phone]  Fax: [phone]      10/24/22,  11:36 AM      No further cardiac testing is recommended for the planned surgical procedure for Kwan Rocha 82 y.o., 1940. Please feel free to call my office with any questions.           Sincerely,          Dr. Glenna Yin

## 2022-10-21 NOTE — TELEPHONE ENCOUNTER
Spoke with patient; aware that we will follow up with this week of 10/24. States will be out of town 10/22 thru 10/27. Can be reached at [phone] via her cell.

## 2022-10-24 NOTE — TELEPHONE ENCOUNTER
Notified patient that she's cleared for surgrey, letter should be faxed to Dr. Cheryl Alba [facility] at [phone]

## 2022-11-08 DIAGNOSIS — M54.50 CHRONIC BILATERAL LOW BACK PAIN WITHOUT SCIATICA: ICD-10-CM

## 2022-11-08 DIAGNOSIS — M47.816 LUMBAR FACET ARTHROPATHY: ICD-10-CM

## 2022-11-08 DIAGNOSIS — G89.29 CHRONIC BILATERAL LOW BACK PAIN WITHOUT SCIATICA: ICD-10-CM

## 2022-11-08 RX ORDER — TRAMADOL HYDROCHLORIDE 50 MG/1
TABLET ORAL
Qty: 360 TABLET | Refills: 0 | Status: SHIPPED | OUTPATIENT
Start: 2022-11-08 | End: 2023-02-06

## 2022-12-08 ENCOUNTER — OFFICE VISIT (OUTPATIENT)
Dept: CARDIOLOGY CLINIC | Age: 82
End: 2022-12-08

## 2022-12-08 VITALS
SYSTOLIC BLOOD PRESSURE: 148 MMHG | WEIGHT: 205 LBS | HEART RATE: 70 BPM | BODY MASS INDEX: 35.19 KG/M2 | DIASTOLIC BLOOD PRESSURE: 90 MMHG

## 2022-12-08 DIAGNOSIS — I25.10 CORONARY ARTERY DISEASE INVOLVING NATIVE CORONARY ARTERY OF NATIVE HEART WITHOUT ANGINA PECTORIS: ICD-10-CM

## 2022-12-08 DIAGNOSIS — E78.2 HYPERLIPIDEMIA, MIXED: ICD-10-CM

## 2022-12-08 DIAGNOSIS — I10 BENIGN ESSENTIAL HTN: ICD-10-CM

## 2022-12-08 RX ORDER — EVOLOCUMAB 140 MG/ML
INJECTION, SOLUTION SUBCUTANEOUS
Qty: 2 ML | Refills: 5 | Status: SHIPPED | OUTPATIENT
Start: 2022-12-08

## 2022-12-08 RX ORDER — EVOLOCUMAB 140 MG/ML
INJECTION, SOLUTION SUBCUTANEOUS
Qty: 2 ML | Refills: 5 | Status: SHIPPED | OUTPATIENT
Start: 2022-12-08 | End: 2022-12-08

## 2022-12-08 ASSESSMENT — ENCOUNTER SYMPTOMS
COLOR CHANGE: 0
FACIAL SWELLING: 0
WHEEZING: 0
BLOOD IN STOOL: 0
EYE DISCHARGE: 0
ABDOMINAL PAIN: 0
COUGH: 0
SHORTNESS OF BREATH: 0
VOMITING: 0
ABDOMINAL DISTENTION: 0
CHEST TIGHTNESS: 0
BACK PAIN: 0

## 2022-12-08 NOTE — ASSESSMENT & PLAN NOTE
Slightly high today, usually well controlled at home. Continue Avapro and ZIAC  She would like me to continue her current regimen at this point.

## 2022-12-08 NOTE — PROGRESS NOTES
[facility]     Outpatient Cardiology         Chief Complaint   Patient presents with    Coronary Artery Disease    Medication Problem     Side-effect from [address]       HPI      Nadege Thomas a 81 y.o. female here for follow up for HLD-would like to discuss Levqio side effects. Hx of CAD,HTN,HLD. CAD, asymptomatic. No episodes of chest pain or angina. Will try again Repatha. Did not like [facility]. Hypertension, slightly high today although usually well controlled. Symptoms change medications today. Close monitoring  Hyperlipidemia, her goal LDL is around 55. She does have a history of prior PCI. She is willing to try Repatha at this point. Had some side effects with Leqvio.       PMH  Past Medical History:   Diagnosis Date    Acquired spondylolisthesis of lumbosacral region     Bilateral primary osteoarthritis of hip     CAD (coronary artery disease)     one stent in heart    Chronic bilateral low back pain without sciatica 2/8/2018    CKD (chronic kidney disease), stage III (HCC)     DDD (degenerative disc disease), lumbar     Dr. Eleazar Marrufo    GERD (gastroesophageal reflux disease)     Headache(784.0)     Heel spur 08/2016    History of lumbar spinal fusion     Hyperlipidemia     Hypertension     IBS (irritable bowel syndrome)     Lyme disease 2009    suspected    Medical history reviewed with no changes     MVP (mitral valve prolapse)     Neuroma nos     Osteoarthritis     Osteopenia     Post-laminectomy syndrome     Rotator cuff tear     Dr. Juan Carlos Rodriguez    Squamous cell cancer of skin of right hand 05/2018    squamous cell carcinoma    Stress incontinence, female     Urinary incontinence        PSH  Past Surgical History:   Procedure Laterality Date    BREAST SURGERY      cyst removal    COLONOSCOPY  2006    CORONARY ANGIOPLASTY WITH STENT PLACEMENT      x1    HC INJECTION PROCEDURE FOR SACROILIAC JOINT Bilateral 7/17/2020    BILATERAL SACROILIAC JOINT INJECTION WITH FLUOROSCOPY performed by Natalie Robles MD at [address]  5/19/14    LUMBAR 4-5 DECOMPRESSIVE LAMINECTOMY AND POSTERIOR LATERAL FUSION LUMBAR 4-5 AND PEDICLE SCREW FIXATION     LAMINECTOMY POSTERIOR THORACIC / LUMBAR Right 4/9/2019    THORACIC LAMINECTOMY WITH PADDLE LEAD AND RECHARGEBLE BATTERY IN RIGHT HIP performed by Eligio Graf MD at [address] [facility]  10/02/2019    R nasal sidewall    DE ARTHROCENTESIS ASPIR&/INJ INTERM JT/BURS W/O US N/A 10/24/2018    MIDLINE CAUDEL INJECTION WITH FLUOROSCOPY performed by Natalie Robles MD at [facility]  2002    stent, LAD    SKIN CANCER EXCISION Right 05/24/2018    Right dorsal hand, Dr. Clara Peabody N/A 3/11/2019    SPINAL CORD STIMULATOR IMPLANT TRIAL WITH Donavan Carbajal performed by Natalie Robles MD at [address]      age 10        Social HIstory  Social History     Tobacco Use    Smoking status: Never    Smokeless tobacco: Never   Vaping Use    Vaping Use: Never used   Substance Use Topics    Alcohol use: No    Drug use: Never       Family History  Family History   Problem Relation Age of Onset    Diabetes Brother     Diabetes Mother     Heart Disease Mother     Heart Disease Father     Cancer Father         skin       Allergies   Allergies   Allergen Reactions    Aquaphor [Aquaphilic]     Other     Statins Other (See Comments)     Severe muscle pain    Adhesive Tape Itching and Rash     Pt Okay with paper tape       Medications:     Home Medications:  Were reviewed and are listed in nursing record. and/or listed below    Prior to Admission medications    Medication Sig Start Date End Date Taking?  Authorizing Provider   Evolocumab (REPATHA SURECLICK) 501 MG/ML SOAJ INJECT 140MG UNDER THE SKIN EVERY 30 DAYS 12/8/22  Yes Carlos Bustillo MD   traMADol (ULTRAM) 50 MG tablet TAKE 1 TABLET EVERY 6 HOURS AS NEEDED FOR PAIN, REDUCE DOSES TAKEN AS PAIN BECOMES MANAGEABLE 11/8/22 2/6/23 Yes Hill James DO   hydroCHLOROthiazide (MICROZIDE) 12.5 MG capsule TAKE 1 CAPSULE DAILY 9/19/22  Yes Hill James DO   irbesartan (AVAPRO) 300 MG tablet TAKE 1 TABLET DAILY 8/22/22  Yes Laverne Arnold MD   gabapentin (NEURONTIN) 300 MG capsule TAKE 1 CAPSULE BY MOUTH EVERY NIGHT 8/22/22 8/22/23 Yes Hill James DO   bisoprolol-hydroCHLOROthiazide (ZIAC) 10-6.25 MG per tablet Take 2 tablets by mouth daily 7/14/22  Yes Laverne Arnold MD   Turmeric 450 MG CAPS Take 450 mg by mouth daily   Yes Historical Provider, MD   vitamin E 400 UNIT capsule Take 400 Units by mouth nightly   Yes Historical Provider, MD   aspirin 81 MG tablet Take 81 mg by mouth daily   Yes Historical Provider, MD   Multiple Vitamins-Minerals (THERAPEUTIC MULTIVITAMIN-MINERALS) tablet Take 1 tablet by mouth daily   Yes Historical Provider, MD   acetaminophen (TYLENOL) 325 MG tablet Take 650 mg by mouth every 6 hours as needed for Pain   Yes Historical Provider, MD   melatonin 3 MG TABS tablet Take 1 tablet by mouth nightly. 9/17/14  Yes Hill James DO   Misc Natural Products (OSTEO BI-FLEX TRIPLE STRENGTH PO) Take 1 tablet by mouth 2 times daily    Yes Historical Provider, MD        Review of Systems   Constitutional:  Negative for activity change, appetite change, diaphoresis, fatigue, fever and unexpected weight change. HENT:  Negative for congestion, facial swelling, mouth sores and nosebleeds. Eyes:  Negative for discharge and visual disturbance. Respiratory:  Negative for cough, chest tightness, shortness of breath and wheezing. Cardiovascular:  Negative for chest pain, palpitations and leg swelling. Gastrointestinal:  Negative for abdominal distention, abdominal pain, blood in stool and vomiting. Endocrine: Negative for cold intolerance, heat intolerance and polyuria. Genitourinary:  Negative for difficulty urinating, dysuria, frequency and hematuria. Musculoskeletal:  Negative for back pain, joint swelling, myalgias and neck pain. Skin:  Negative for color change, pallor and rash. Allergic/Immunologic: Negative for immunocompromised state. Neurological:  Negative for dizziness, syncope, weakness, light-headedness, numbness and headaches. Hematological:  Negative for adenopathy. Does not bruise/bleed easily. Psychiatric/Behavioral:  Negative for behavioral problems, confusion, decreased concentration and suicidal ideas. The patient is not nervous/anxious. Vitals:    12/08/22 1248   BP: (!) 148/90   Pulse: 70    Weight: 205 lb (93 kg)       Vitals:    12/08/22 1243 12/08/22 1248   BP: (!) 146/90 (!) 148/90   Site: Left Upper Arm    Position: Sitting    Cuff Size: Medium Adult    Pulse: 90 70   Weight: 205 lb (93 kg)        BP Readings from Last 3 Encounters:   12/08/22 (!) 148/90   10/20/22 (!) 152/80   07/14/22 (!) 146/92       Wt Readings from Last 3 Encounters:   12/08/22 205 lb (93 kg)   10/20/22 203 lb 12.8 oz (92.4 kg)   07/14/22 203 lb 9.6 oz (92.4 kg)       Physical Exam  Constitutional:       General: She is not in acute distress. Appearance: She is well-developed. She is not diaphoretic. HENT:      Head: Normocephalic and atraumatic. Eyes:      Pupils: Pupils are equal, round, and reactive to light. Neck:      Thyroid: No thyromegaly. Vascular: No JVD. Cardiovascular:      Rate and Rhythm: Normal rate and regular rhythm. Chest Wall: PMI is not displaced. Heart sounds: Normal heart sounds, S1 normal and S2 normal. No murmur heard. No friction rub. No gallop. Pulmonary:      Effort: Pulmonary effort is normal. No respiratory distress. Breath sounds: Normal breath sounds. No stridor. No wheezing or rales. Chest:      Chest wall: No tenderness. Abdominal:      General: Bowel sounds are normal. There is no distension. Palpations: Abdomen is soft. Tenderness: There is no abdominal tenderness. There is no guarding or rebound. Musculoskeletal:         General: No tenderness. Normal range of motion. Cervical back: Normal range of motion. Lymphadenopathy:      Cervical: No cervical adenopathy. Skin:     General: Skin is warm and dry. Findings: No erythema or rash. Neurological:      Mental Status: She is alert and oriented to person, place, and time. Coordination: Coordination normal.   Psychiatric:         Behavior: Behavior normal.         Thought Content:  Thought content normal.         Judgment: Judgment normal.       Labs:       Lab Results   Component Value Date    WBC 4.8 06/23/2022    HGB 15.4 06/23/2022    HCT 45.0 06/23/2022    MCV 97.1 06/23/2022     06/23/2022     Lab Results   Component Value Date     06/23/2022    K 4.4 06/23/2022     06/23/2022    CO2 25 06/23/2022    BUN 27 (H) 06/23/2022    CREATININE 1.0 06/23/2022    GLUCOSE 102 (H) 06/23/2022    CALCIUM 9.7 06/23/2022    PROT 7.3 06/23/2022    LABALBU 4.1 06/23/2022    BILITOT 0.4 06/23/2022    ALKPHOS 82 06/23/2022    AST 23 06/23/2022    ALT 15 06/23/2022    LABGLOM 53 (A) 06/23/2022    GFRAA >60 06/23/2022    AGRATIO 1.3 06/23/2022    GLOB 3.2 07/15/2021         Lab Results   Component Value Date    CHOL 254 (H) 06/23/2022    CHOL 221 (H) 12/13/2021    CHOL 188 07/15/2021     Lab Results   Component Value Date    TRIG 366 (H) 06/23/2022    TRIG 383 (H) 12/13/2021    TRIG 265 (H) 07/15/2021     Lab Results   Component Value Date    HDL 41 06/23/2022    HDL 53 12/13/2021    HDL 46 07/15/2021     Lab Results   Component Value Date    LDLCALC see below 06/23/2022 [address] see below 12/13/2021    [address] 07/15/2021     Lab Results   Component Value Date    LABVLDL see below 06/23/2022    LABVLDL see below 12/13/2021    LABVLDL 53 07/15/2021     No results found for: [facility]    Lab Results   Component Value Date    INR 0.99 04/09/2019    INR 0.90 03/11/2019    INR 0.89 05/13/2014    PROTIME 11.3 04/09/2019    PROTIME 10.3 03/11/2019    PROTIME 10.0 05/13/2014       The ASCVD Risk score (Jacy DK, et al., 2019) failed to calculate for the following reasons: The 2019 ASCVD risk score is only valid for ages 36 to 78      Imaging:       Last ECG (if available):  NSR, 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 There is no evidence of    myocardial ischemia or scar. Left ventricular ejection fraction of 71 %. Overall findings represent a low risk scan. 12/8/14   Summary    Left ventricular ejection fraction of 74 %. The LV wall motion is normal.    There is normal isotope uptake at stress and rest. There is no evidence of    myocardial ischemia or scar. Last Cath (if available):    Last TTE/ALANNAH(if available): 4/4/19   Summary   Left ventricular cavity size is normal. There is moderate concentric left   ventricular hypertrophy. Overall left ventricular systolic function appears   low normal with an ejection fraction of 50-55%. Abnormal septal motion is   present. Normal diastolic function. Mild tricuspid regurgitation. Estimated   pulmonary artery systolic pressure is at 32 mmHg assuming a right atrial   pressure of 3 mmHg. No prior echo for comparison. Last CMR  (if available):      Assessment / Plan:     Benign essential HTN  Slightly high today, usually well controlled at home. Continue Avapro and ZIAC  She would like me to continue her current regimen at this point. Hyperlipidemia, mixed  Had some side effects with Leqvio as well. She is willing to go back to Repatha once a month. We will reassess LDL in the future and adjust dosing if needed. (She does not want to try Repatha twice a month neither use the once a month dosing)    Coronary artery disease involving native coronary artery of native heart without angina pectoris  Asymptomatic.   Continue risk factor modification, prescribe again Repatha. Given her history of coronary disease her LDL is way too high, we would like an aim for an LDL of 55. She is willing to try Repatha again. Follow up in 6 months. I had the opportunity to review the clinical symptoms and presentation of Nighat Mallory. Patient's allergies and medications were reviewed and updated. Patient's past medical, surgical, social and family history were reviewed and updated. Patient's testing including laboratory, ECGs, monitor, imaging (TTE,ALANNAH,CMR,cath) were reviewed. All questions and concerns were addressed to the patient/family. Alternatives to my treatment were discussed. The note was completed using EMR. Every effort was made to ensure accuracy; however, inadvertent computerized transcription errors may be present. Thank you for allowing me to participate in the care or [address] [facility] [address]  Marielos Estes MD, [facility]

## 2022-12-08 NOTE — ASSESSMENT & PLAN NOTE
Had some side effects with Leqvio as well. She is willing to go back to Repatha once a month. We will reassess LDL in the future and adjust dosing if needed.   (She does not want to try Repatha twice a month neither use the once a month dosing)

## 2022-12-12 DIAGNOSIS — E78.2 HYPERLIPIDEMIA, MIXED: ICD-10-CM

## 2022-12-12 DIAGNOSIS — N18.31 STAGE 3A CHRONIC KIDNEY DISEASE (HCC): ICD-10-CM

## 2022-12-12 DIAGNOSIS — R73.01 IMPAIRED FASTING GLUCOSE: ICD-10-CM

## 2022-12-12 DIAGNOSIS — I10 BENIGN ESSENTIAL HTN: Primary | ICD-10-CM

## 2022-12-12 DIAGNOSIS — I25.10 CORONARY ARTERY DISEASE INVOLVING NATIVE CORONARY ARTERY OF NATIVE HEART WITHOUT ANGINA PECTORIS: ICD-10-CM

## 2022-12-14 ENCOUNTER — HOSPITAL ENCOUNTER (OUTPATIENT)
Age: 82
Discharge: HOME | End: 2022-12-14
Payer: MEDICARE

## 2022-12-14 DIAGNOSIS — E78.2 HYPERLIPIDEMIA, MIXED: ICD-10-CM

## 2022-12-14 DIAGNOSIS — R73.01 IMPAIRED FASTING GLUCOSE: ICD-10-CM

## 2022-12-14 DIAGNOSIS — N18.31 STAGE 3A CHRONIC KIDNEY DISEASE (HCC): ICD-10-CM

## 2022-12-14 DIAGNOSIS — I10 BENIGN ESSENTIAL HTN: ICD-10-CM

## 2022-12-14 DIAGNOSIS — I25.10 CORONARY ARTERY DISEASE INVOLVING NATIVE CORONARY ARTERY OF NATIVE HEART WITHOUT ANGINA PECTORIS: ICD-10-CM

## 2022-12-14 LAB
A/G RATIO: 1.1 (ref 1.1–2.2)
ALBUMIN SERPL-MCNC: 3.9 G/DL (ref 3.4–5)
ALP BLD-CCNC: 82 U/L (ref 40–129)
ALT SERPL-CCNC: 17 U/L (ref 10–40)
ANION GAP SERPL CALCULATED.3IONS-SCNC: 12 MMOL/L (ref 3–16)
AST SERPL-CCNC: 30 U/L (ref 15–37)
BASOPHILS ABSOLUTE: 0 K/UL (ref 0–0.2)
BASOPHILS RELATIVE PERCENT: 0.7 %
BILIRUB SERPL-MCNC: 0.4 MG/DL (ref 0–1)
BUN BLDV-MCNC: 23 MG/DL (ref 7–20)
CALCIUM SERPL-MCNC: 9.7 MG/DL (ref 8.3–10.6)
CHLORIDE BLD-SCNC: 100 MMOL/L (ref 99–110)
CHOLESTEROL, TOTAL: 215 MG/DL (ref 0–199)
CO2: 27 MMOL/L (ref 21–32)
CREAT SERPL-MCNC: 1.2 MG/DL (ref 0.6–1.2)
EOSINOPHILS ABSOLUTE: 0.2 K/UL (ref 0–0.6)
EOSINOPHILS RELATIVE PERCENT: 4.1 %
GFR SERPL CREATININE-BSD FRML MDRD: 45 ML/MIN/{1.73_M2}
GLUCOSE BLD-MCNC: 108 MG/DL (ref 70–99)
HCT VFR BLD CALC: 45.2 % (ref 36–48)
HDLC SERPL-MCNC: 40 MG/DL (ref 40–60)
HEMOGLOBIN: 14.9 G/DL (ref 12–16)
LDL CHOLESTEROL CALCULATED: ABNORMAL MG/DL
LDL CHOLESTEROL DIRECT: 113 MG/DL
LYMPHOCYTES ABSOLUTE: 1.2 K/UL (ref 1–5.1)
LYMPHOCYTES RELATIVE PERCENT: 21.7 %
MCH RBC QN AUTO: 33.4 PG (ref 26–34)
MCHC RBC AUTO-ENTMCNC: 33 G/DL (ref 31–36)
MCV RBC AUTO: 101 FL (ref 80–100)
MONOCYTES ABSOLUTE: 0.5 K/UL (ref 0–1.3)
MONOCYTES RELATIVE PERCENT: 8.4 %
NEUTROPHILS ABSOLUTE: 3.5 K/UL (ref 1.7–7.7)
NEUTROPHILS RELATIVE PERCENT: 65.1 %
PDW BLD-RTO: 13.8 % (ref 12.4–15.4)
PLATELET # BLD: 185 K/UL (ref 135–450)
PMV BLD AUTO: 10.2 FL (ref 5–10.5)
POTASSIUM SERPL-SCNC: 4.3 MMOL/L (ref 3.5–5.1)
RBC # BLD: 4.48 M/UL (ref 4–5.2)
SODIUM BLD-SCNC: 139 MMOL/L (ref 136–145)
TOTAL PROTEIN: 7.3 G/DL (ref 6.4–8.2)
TRIGL SERPL-MCNC: 362 MG/DL (ref 0–150)
TSH SERPL DL<=0.05 MIU/L-ACNC: 1.75 UIU/ML (ref 0.27–4.2)
VLDLC SERPL CALC-MCNC: ABNORMAL MG/DL
WBC # BLD: 5.4 K/UL (ref 4–11)

## 2022-12-14 PROCEDURE — 80061 LIPID PANEL: CPT

## 2022-12-14 PROCEDURE — 83036 HEMOGLOBIN GLYCOSYLATED A1C: CPT

## 2022-12-14 PROCEDURE — 84443 ASSAY THYROID STIM HORMONE: CPT

## 2022-12-14 PROCEDURE — 36415 COLL VENOUS BLD VENIPUNCTURE: CPT

## 2022-12-14 PROCEDURE — 85025 COMPLETE CBC W/AUTO DIFF WBC: CPT

## 2022-12-14 PROCEDURE — 80053 COMPREHEN METABOLIC PANEL: CPT

## 2022-12-15 LAB
ESTIMATED AVERAGE GLUCOSE: 116.9 MG/DL
HBA1C MFR BLD: 5.7 %

## 2022-12-16 ENCOUNTER — OFFICE VISIT (OUTPATIENT)
Dept: INTERNAL MEDICINE CLINIC | Age: 82
End: 2022-12-16
Payer: MEDICARE

## 2022-12-16 VITALS
RESPIRATION RATE: 14 BRPM | HEART RATE: 91 BPM | OXYGEN SATURATION: 90 % | DIASTOLIC BLOOD PRESSURE: 76 MMHG | SYSTOLIC BLOOD PRESSURE: 128 MMHG | BODY MASS INDEX: 34.83 KG/M2 | WEIGHT: 204 LBS | HEIGHT: 64 IN

## 2022-12-16 DIAGNOSIS — E78.2 HYPERLIPIDEMIA, MIXED: ICD-10-CM

## 2022-12-16 DIAGNOSIS — I25.10 CORONARY ARTERY DISEASE INVOLVING NATIVE CORONARY ARTERY OF NATIVE HEART WITHOUT ANGINA PECTORIS: ICD-10-CM

## 2022-12-16 DIAGNOSIS — M85.88 OSTEOPENIA OF LUMBAR SPINE: ICD-10-CM

## 2022-12-16 DIAGNOSIS — G89.29 CHRONIC BILATERAL LOW BACK PAIN WITHOUT SCIATICA: ICD-10-CM

## 2022-12-16 DIAGNOSIS — M54.50 CHRONIC BILATERAL LOW BACK PAIN WITHOUT SCIATICA: ICD-10-CM

## 2022-12-16 DIAGNOSIS — N18.31 STAGE 3A CHRONIC KIDNEY DISEASE (HCC): Primary | ICD-10-CM

## 2022-12-16 DIAGNOSIS — I10 BENIGN ESSENTIAL HTN: ICD-10-CM

## 2022-12-16 DIAGNOSIS — E66.01 CLASS 2 SEVERE OBESITY DUE TO EXCESS CALORIES WITH SERIOUS COMORBIDITY AND BODY MASS INDEX (BMI) OF 35.0 TO 35.9 IN ADULT (HCC): ICD-10-CM

## 2022-12-16 DIAGNOSIS — R73.01 IMPAIRED FASTING GLUCOSE: ICD-10-CM

## 2022-12-16 PROCEDURE — G8484 FLU IMMUNIZE NO ADMIN: HCPCS | Performed by: INTERNAL MEDICINE

## 2022-12-16 PROCEDURE — 1036F TOBACCO NON-USER: CPT | Performed by: INTERNAL MEDICINE

## 2022-12-16 PROCEDURE — G8417 CALC BMI ABV UP PARAM F/U: HCPCS | Performed by: INTERNAL MEDICINE

## 2022-12-16 PROCEDURE — 1090F PRES/ABSN URINE INCON ASSESS: CPT | Performed by: INTERNAL MEDICINE

## 2022-12-16 PROCEDURE — 99214 OFFICE O/P EST MOD 30 MIN: CPT | Performed by: INTERNAL MEDICINE

## 2022-12-16 PROCEDURE — G8427 DOCREV CUR MEDS BY ELIG CLIN: HCPCS | Performed by: INTERNAL MEDICINE

## 2022-12-16 PROCEDURE — G8399 PT W/DXA RESULTS DOCUMENT: HCPCS | Performed by: INTERNAL MEDICINE

## 2022-12-16 PROCEDURE — 3078F DIAST BP <80 MM HG: CPT | Performed by: INTERNAL MEDICINE

## 2022-12-16 PROCEDURE — 3074F SYST BP LT 130 MM HG: CPT | Performed by: INTERNAL MEDICINE

## 2022-12-16 PROCEDURE — 1123F ACP DISCUSS/DSCN MKR DOCD: CPT | Performed by: INTERNAL MEDICINE

## 2022-12-16 SDOH — ECONOMIC STABILITY: FOOD INSECURITY: WITHIN THE PAST 12 MONTHS, YOU WORRIED THAT YOUR FOOD WOULD RUN OUT BEFORE YOU GOT MONEY TO BUY MORE.: NEVER TRUE

## 2022-12-16 SDOH — ECONOMIC STABILITY: FOOD INSECURITY: WITHIN THE PAST 12 MONTHS, THE FOOD YOU BOUGHT JUST DIDN'T LAST AND YOU DIDN'T HAVE MONEY TO GET MORE.: NEVER TRUE

## 2022-12-16 ASSESSMENT — SOCIAL DETERMINANTS OF HEALTH (SDOH): HOW HARD IS IT FOR YOU TO PAY FOR THE VERY BASICS LIKE FOOD, HOUSING, MEDICAL CARE, AND HEATING?: NOT HARD AT ALL

## 2022-12-16 NOTE — PROGRESS NOTES
[facility]) Physicians  Internal Medicine  Patient Encounter  Shirley Schmidt D.O., [facility]       Chief Complaint   Patient presents with    Check-Up    Medication Check         HPI-- 81 y.o. female with multiple medical problems seen today for follow up regarding the status of these issues listed below a long with a med review. Due to have DEXA. Chronic low back pain-- Previous history ----> she recently underwent an MRI of the lumbar spine which revealed fairly significant spondylolisthesis at L4-L5. There was posterior fixation at this level from prior spine surgery. She was also found to have moderate spinal canal stenosis at L2-3 and multiple levels of moderate degree neural foraminal stenosis. She was also found to have multiple levels of facet arthropathy and degenerative disc disease as well. She was referred to our physical medicine rehabilitation specialist, Dr. Thiago Lopez. Patient is status post spinal cord stimulator placement by Dr Vamsi Wallace. Interval history-----> Patient is under the care of Dr. Thiago Lopez. She is going to have a \"caudal\" injection. She will have this on 12/19/2022. She is maintained on Tramadol three times daily and Gabapentin. She denies adverse effects. The medication is effective, but not great. Coronary Artery Disease-- Previous history ----> history of PTCA with stent of the LAD in 2002. Interval history----> Patient last saw Dr. Rosi Craven on 12/8/2022. Pt is not able to tolerate statins or Zetia. She did not tolerate the Leqvio. She is thinking about going back to [address] [place] [address]. She denies CP, SOB, palpitations, orthopnea, swelling. HTN--  She denies HA's, dizziness, swelling, syncope. She denies unilateral weakness, speech or vision changes. Hyperlipidemia:    Lab Results   Component Value Date    LDLCALC see below 12/14/2022     Previously----> Patient has a history of statin intolerance as well as Zetia intolerance. Interval Hx---->As above.   Her last LDL was 113-- one of the best she hashad. This is likely due to effects of the Leqvio. Going to restart on Repatha. She eats three meals daily. She feels the diet is \"balanced. \"     CKD-- Stage 3. She sees Dr. Edgar Craig. She has not been seen in a few years. Interval history ---> Patient denies any foamy or frothy urine. She denies edema. Past Medical History:   Diagnosis Date    Acquired spondylolisthesis of lumbosacral region     Bilateral primary osteoarthritis of hip     CAD (coronary artery disease)     one stent in heart    Chronic bilateral low back pain without sciatica 2/8/2018    CKD (chronic kidney disease), stage III (HCC)     DDD (degenerative disc disease), lumbar     Dr. Amber Sagastume    GERD (gastroesophageal reflux disease)     Headache(784.0)     Heel spur 08/2016    History of lumbar spinal fusion     Hyperlipidemia     Hypertension     IBS (irritable bowel syndrome)     Lyme disease 2009    suspected    Medical history reviewed with no changes     MVP (mitral valve prolapse)     Neuroma nos     Osteoarthritis     Osteopenia     Post-laminectomy syndrome     Rotator cuff tear     Dr. Myles Swanson    Squamous cell cancer of skin of right hand 05/2018    squamous cell carcinoma    Stress incontinence, female     Urinary incontinence          Review of Systems-- As per HPI        Objective     Vitals:    12/16/22 1022   BP: 128/76   Pulse: 91   Resp: 14   SpO2: 90%   Weight: 204 lb (92.5 kg)   Height: 5' 4\" (1.626 m)     Body mass index is 35.02 kg/m². Wt Readings from Last 3 Encounters:   12/16/22 204 lb (92.5 kg)   12/08/22 205 lb (93 kg)   10/20/22 203 lb 12.8 oz (92.4 kg)     BP Readings from Last 3 Encounters:   12/16/22 128/76   12/08/22 (!) 148/90   10/20/22 (!) 152/80       GEN: AND. A&O  HEENT: NC/AT, SWAPNA, EOMI. Anicteric. NECK: Supple, No thyromegaly or nodules. No JVD. Trachea midline. LYMPH:  No C/SC nodes  CV: Regular rhythm. Rate normal.   No murmurs. No ectopy  PULM: CTA, no wheezing  VASC: No carotid bruits. Pedal pulses are symmetric. EXT:  No pitting edema  NEURO:  No focal deficits. No motor deficits. SKIN: No rash. No lesions of concern. MS: Tenderness with palpation of the lumbar paraspinals. Increased soft tissue tone and spasm noted. ROM is limited in all plains. ASSESSMENT/PLAN:    1. Stage 3a chronic kidney disease ([address]  Patient is stable  Reminded patient to avoid NSAIDs  Encourage patient to stay well-hydrated particularly during a diarrhea illness should she get that  Monitor for foamy or frothy urine, dysuria, hematuria, swelling  Lab in 6 months    2. Benign essential HTN  Well-controlled  Continue hydrochlorothiazide 12.5 mg daily, Avapro 300 mg daily, bisoprolol-HCT 10-6.25 daily (currently on 18.75 mg of hydrochlorothiazide)  Stay well-hydrated  Monitor for hypotension  Electrolytes reviewed. Renal function stable    3. Coronary artery disease involving native coronary artery of native heart without angina pectoris  Condition is stable without signs of angina or anginal equivalents  Continue efforts with lifestyle approach and weight loss  Continue current medications noted above along with low-dose aspirin  Lipid therapy managed by cardiology    4. Hyperlipidemia, mixed  Currently untreated  Patient to to start [employer] again  Continue lifestyle approach    5. Chronic bilateral low back pain without sciatica  Condition is about the same overall  Continue tramadol. We have taken a few minutes to discuss potential adverse side effects and habit-forming nature of the medication. OAARS reviewed today  Continue gabapentin    6. Osteopenia of lumbar spine  Encouraged patient to get her DEXA scan patient counseled on weight loss strategies    7. Class 2 severe obesity due to excess calories with serious comorbidity and body mass index (BMI) of 35.0 to 35.9 in adult [facility])  Condition is uncontrolled. She is gained about 10 pounds in the last year. Patient counseled on lifestyle modification strategies        This note was generated completely or in part utilizing Dragon dictation speech recognition software. Occasionally, words are mistranscribed and despite editing, the text may contain inaccuracies due to incorrect word recognition.   If further clarification is needed please contact the office at [phone]

## 2023-02-15 DIAGNOSIS — G89.29 CHRONIC BILATERAL LOW BACK PAIN WITHOUT SCIATICA: ICD-10-CM

## 2023-02-15 DIAGNOSIS — M54.50 CHRONIC BILATERAL LOW BACK PAIN WITHOUT SCIATICA: ICD-10-CM

## 2023-02-15 DIAGNOSIS — M47.816 LUMBAR FACET ARTHROPATHY: ICD-10-CM

## 2023-02-15 RX ORDER — TRAMADOL HYDROCHLORIDE 50 MG/1
TABLET ORAL
Qty: 360 TABLET | Refills: 0 | Status: SHIPPED | OUTPATIENT
Start: 2023-02-15 | End: 2023-05-16

## 2023-03-10 ENCOUNTER — TELEPHONE (OUTPATIENT)
Dept: INTERNAL MEDICINE CLINIC | Age: 83
End: 2023-03-10

## 2023-03-14 ENCOUNTER — HOSPITAL ENCOUNTER (OUTPATIENT)
Dept: GENERAL RADIOLOGY | Age: 83
Discharge: HOME | End: 2023-03-14
Payer: MEDICARE

## 2023-03-14 DIAGNOSIS — Z78.0 POSTMENOPAUSAL: ICD-10-CM

## 2023-03-14 DIAGNOSIS — M85.88 OSTEOPENIA OF LUMBAR SPINE: ICD-10-CM

## 2023-03-14 PROCEDURE — 77080 DXA BONE DENSITY AXIAL: CPT

## 2023-05-04 DIAGNOSIS — G89.29 CHRONIC BILATERAL LOW BACK PAIN WITHOUT SCIATICA: ICD-10-CM

## 2023-05-04 DIAGNOSIS — M54.50 CHRONIC BILATERAL LOW BACK PAIN WITHOUT SCIATICA: ICD-10-CM

## 2023-05-04 DIAGNOSIS — M47.816 LUMBAR FACET ARTHROPATHY: ICD-10-CM

## 2023-05-04 RX ORDER — TRAMADOL HYDROCHLORIDE 50 MG/1
TABLET ORAL
Qty: 360 TABLET | Refills: 0 | Status: SHIPPED | OUTPATIENT
Start: 2023-05-04 | End: 2023-08-02

## 2023-06-02 ENCOUNTER — TELEPHONE (OUTPATIENT)
Dept: INTERNAL MEDICINE CLINIC | Age: 83
End: 2023-06-02

## 2023-06-02 NOTE — TELEPHONE ENCOUNTER
----- Message from [facility] sent at 6/2/2023 11:09 AM EDT -----  Subject: Message to Provider    QUESTIONS  Information for Provider? Patient would like the [facility] Medicare Questionare   to be mailed to her home. She said she misplaced the copy she had so she   needs another one before her next appointment. Please call to inform   patient it has been mailed off.   ---------------------------------------------------------------------------  --------------  [address]  [phone]; OK to leave message on voicemail  ---------------------------------------------------------------------------  --------------  SCRIPT ANSWERS  Relationship to Patient?  Self

## 2023-06-16 SDOH — HEALTH STABILITY: PHYSICAL HEALTH: ON AVERAGE, HOW MANY MINUTES DO YOU ENGAGE IN EXERCISE AT THIS LEVEL?: 0 MIN

## 2023-06-16 SDOH — ECONOMIC STABILITY: FOOD INSECURITY: WITHIN THE PAST 12 MONTHS, YOU WORRIED THAT YOUR FOOD WOULD RUN OUT BEFORE YOU GOT MONEY TO BUY MORE.: NEVER TRUE

## 2023-06-16 SDOH — HEALTH STABILITY: PHYSICAL HEALTH: ON AVERAGE, HOW MANY DAYS PER WEEK DO YOU ENGAGE IN MODERATE TO STRENUOUS EXERCISE (LIKE A BRISK WALK)?: 0 DAYS

## 2023-06-16 SDOH — ECONOMIC STABILITY: FOOD INSECURITY: WITHIN THE PAST 12 MONTHS, THE FOOD YOU BOUGHT JUST DIDN'T LAST AND YOU DIDN'T HAVE MONEY TO GET MORE.: NEVER TRUE

## 2023-06-16 SDOH — ECONOMIC STABILITY: HOUSING INSECURITY
IN THE LAST 12 MONTHS, WAS THERE A TIME WHEN YOU DID NOT HAVE A STEADY PLACE TO SLEEP OR SLEPT IN A SHELTER (INCLUDING NOW)?: NO

## 2023-06-16 SDOH — ECONOMIC STABILITY: TRANSPORTATION INSECURITY
IN THE PAST 12 MONTHS, HAS LACK OF TRANSPORTATION KEPT YOU FROM MEETINGS, WORK, OR FROM GETTING THINGS NEEDED FOR DAILY LIVING?: NO

## 2023-06-16 SDOH — ECONOMIC STABILITY: INCOME INSECURITY: HOW HARD IS IT FOR YOU TO PAY FOR THE VERY BASICS LIKE FOOD, HOUSING, MEDICAL CARE, AND HEATING?: NOT HARD AT ALL

## 2023-06-16 ASSESSMENT — PATIENT HEALTH QUESTIONNAIRE - PHQ9
SUM OF ALL RESPONSES TO PHQ QUESTIONS 1-9: 0
SUM OF ALL RESPONSES TO PHQ QUESTIONS 1-9: 0
1. LITTLE INTEREST OR PLEASURE IN DOING THINGS: 0
SUM OF ALL RESPONSES TO PHQ9 QUESTIONS 1 & 2: 0
SUM OF ALL RESPONSES TO PHQ QUESTIONS 1-9: 0
2. FEELING DOWN, DEPRESSED OR HOPELESS: 0
SUM OF ALL RESPONSES TO PHQ QUESTIONS 1-9: 0

## 2023-06-16 ASSESSMENT — LIFESTYLE VARIABLES
HOW MANY STANDARD DRINKS CONTAINING ALCOHOL DO YOU HAVE ON A TYPICAL DAY: PATIENT DOES NOT DRINK
HOW OFTEN DO YOU HAVE A DRINK CONTAINING ALCOHOL: 1
HOW MANY STANDARD DRINKS CONTAINING ALCOHOL DO YOU HAVE ON A TYPICAL DAY: 0
HOW OFTEN DO YOU HAVE A DRINK CONTAINING ALCOHOL: NEVER
HOW OFTEN DO YOU HAVE SIX OR MORE DRINKS ON ONE OCCASION: 1

## 2023-06-19 ENCOUNTER — OFFICE VISIT (OUTPATIENT)
Dept: INTERNAL MEDICINE CLINIC | Age: 83
End: 2023-06-19
Payer: MEDICARE

## 2023-06-19 VITALS
HEART RATE: 67 BPM | HEIGHT: 64 IN | WEIGHT: 206 LBS | DIASTOLIC BLOOD PRESSURE: 88 MMHG | OXYGEN SATURATION: 97 % | SYSTOLIC BLOOD PRESSURE: 128 MMHG | RESPIRATION RATE: 14 BRPM | BODY MASS INDEX: 35.17 KG/M2

## 2023-06-19 DIAGNOSIS — N18.31 STAGE 3A CHRONIC KIDNEY DISEASE (HCC): ICD-10-CM

## 2023-06-19 DIAGNOSIS — M54.50 CHRONIC BILATERAL LOW BACK PAIN WITHOUT SCIATICA: ICD-10-CM

## 2023-06-19 DIAGNOSIS — M48.061 SPINAL STENOSIS OF LUMBAR REGION WITHOUT NEUROGENIC CLAUDICATION: ICD-10-CM

## 2023-06-19 DIAGNOSIS — R73.01 IMPAIRED FASTING GLUCOSE: ICD-10-CM

## 2023-06-19 DIAGNOSIS — E78.2 HYPERLIPIDEMIA, MIXED: ICD-10-CM

## 2023-06-19 DIAGNOSIS — G89.29 CHRONIC BILATERAL LOW BACK PAIN WITHOUT SCIATICA: ICD-10-CM

## 2023-06-19 DIAGNOSIS — M47.816 LUMBAR FACET ARTHROPATHY: ICD-10-CM

## 2023-06-19 DIAGNOSIS — I25.10 CORONARY ARTERY DISEASE INVOLVING NATIVE CORONARY ARTERY OF NATIVE HEART WITHOUT ANGINA PECTORIS: ICD-10-CM

## 2023-06-19 DIAGNOSIS — Z00.00 MEDICARE ANNUAL WELLNESS VISIT, SUBSEQUENT: Primary | ICD-10-CM

## 2023-06-19 DIAGNOSIS — I10 BENIGN ESSENTIAL HTN: ICD-10-CM

## 2023-06-19 PROCEDURE — 3079F DIAST BP 80-89 MM HG: CPT | Performed by: INTERNAL MEDICINE

## 2023-06-19 PROCEDURE — G0439 PPPS, SUBSEQ VISIT: HCPCS | Performed by: INTERNAL MEDICINE

## 2023-06-19 PROCEDURE — 1123F ACP DISCUSS/DSCN MKR DOCD: CPT | Performed by: INTERNAL MEDICINE

## 2023-06-19 PROCEDURE — 3074F SYST BP LT 130 MM HG: CPT | Performed by: INTERNAL MEDICINE

## 2023-06-19 NOTE — PATIENT INSTRUCTIONS
include:    Chest pain or pressure, or a strange feeling in the chest.     Sweating. Shortness of breath. Pain, pressure, or a strange feeling in the back, neck, jaw, or upper belly or in one or both shoulders or arms. Lightheadedness or sudden weakness. A fast or irregular heartbeat. After you call 911, the  may tell you to chew 1 adult-strength or 2 to 4 low-dose aspirin. Wait for an ambulance. Do not try to drive yourself. Watch closely for changes in your health, and be sure to contact your doctor if you have any problems. Where can you learn more? Go to http://www.aquino.com/ and enter F075 to learn more about \"A Healthy Heart: Care Instructions. \"  Current as of: September 7, 2022               Content Version: 13.7  © 1055-8808 Healthwise, Incorporated. Care instructions adapted under license by [facility] ([facility]). If you have questions about a medical condition or this instruction, always ask your healthcare professional. [place] [address] any warranty or liability for your use of this information.

## 2023-06-19 NOTE — PROGRESS NOTES
[facility]) Physicians  Internal Medicine  Patient Encounter  Debbie Rocha D.O., [facility] Annual Wellness Visit  Rivas Gregorio  6/19/2023    Subjective Rivas Gregorio is here for Iqra DWYER      Medical/Surgical Histories     Past Medical History:   Diagnosis Date    Acquired spondylolisthesis of lumbosacral region     Bilateral primary osteoarthritis of hip     CAD (coronary artery disease)     one stent in heart    Chronic bilateral low back pain without sciatica 2/8/2018    CKD (chronic kidney disease), stage III (HCC)     DDD (degenerative disc disease), lumbar     Dr. Azucena Galindo    GERD (gastroesophageal reflux disease)     Headache(784.0)     Heel spur 08/2016    History of lumbar spinal fusion     Hyperlipidemia     Hypertension     IBS (irritable bowel syndrome)     Lyme disease 2009    suspected    Medical history reviewed with no changes     MVP (mitral valve prolapse)     Neuroma nos     Osteoarthritis     Osteopenia     Post-laminectomy syndrome     Rotator cuff tear     Dr. Jeovany Kaye    Squamous cell cancer of skin of right hand 05/2018    squamous cell carcinoma    Stress incontinence, female     Urinary incontinence           Past Surgical History:   Procedure Laterality Date    BREAST SURGERY      cyst removal    COLONOSCOPY  2006    CORONARY ANGIOPLASTY WITH STENT PLACEMENT      x1    HC INJECTION PROCEDURE FOR SACROILIAC JOINT Bilateral 7/17/2020    BILATERAL SACROILIAC JOINT INJECTION WITH FLUOROSCOPY performed by Vinod Ariza MD at [address] [place] [address]  5/19/14    LUMBAR 4-5 DECOMPRESSIVE LAMINECTOMY AND POSTERIOR LATERAL FUSION LUMBAR 4-5 AND PEDICLE SCREW FIXATION     LAMINECTOMY POSTERIOR THORACIC / LUMBAR Right 4/9/2019    THORACIC LAMINECTOMY WITH PADDLE LEAD AND RECHARGEBLE BATTERY IN RIGHT HIP performed by Madeleine Sigala MD at [address] [facility]  10/02/2019    R nasal sidewall    [place] ARTHROCENTESIS ASPIR&/INJ INTERM

## 2023-06-20 ENCOUNTER — HOSPITAL ENCOUNTER (OUTPATIENT)
Age: 83
Discharge: HOME | End: 2023-06-20
Payer: MEDICARE

## 2023-06-20 DIAGNOSIS — I10 BENIGN ESSENTIAL HTN: ICD-10-CM

## 2023-06-20 DIAGNOSIS — Z00.00 MEDICARE ANNUAL WELLNESS VISIT, SUBSEQUENT: ICD-10-CM

## 2023-06-20 DIAGNOSIS — I25.10 CORONARY ARTERY DISEASE INVOLVING NATIVE CORONARY ARTERY OF NATIVE HEART WITHOUT ANGINA PECTORIS: ICD-10-CM

## 2023-06-20 DIAGNOSIS — N18.31 STAGE 3A CHRONIC KIDNEY DISEASE (HCC): ICD-10-CM

## 2023-06-20 DIAGNOSIS — E78.2 HYPERLIPIDEMIA, MIXED: ICD-10-CM

## 2023-06-20 DIAGNOSIS — R73.01 IMPAIRED FASTING GLUCOSE: ICD-10-CM

## 2023-06-20 LAB
ALBUMIN SERPL-MCNC: 4.1 G/DL (ref 3.4–5)
ALBUMIN/GLOB SERPL: 1.3 {RATIO} (ref 1.1–2.2)
ALP SERPL-CCNC: 78 U/L (ref 40–129)
ALT SERPL-CCNC: 16 U/L (ref 10–40)
ANION GAP SERPL CALCULATED.3IONS-SCNC: 15 MMOL/L (ref 3–16)
AST SERPL-CCNC: 24 U/L (ref 15–37)
BASOPHILS # BLD: 0.1 K/UL (ref 0–0.2)
BASOPHILS NFR BLD: 1.1 %
BILIRUB SERPL-MCNC: 0.4 MG/DL (ref 0–1)
BUN SERPL-MCNC: 24 MG/DL (ref 7–20)
CALCIUM SERPL-MCNC: 9.6 MG/DL (ref 8.3–10.6)
CHLORIDE SERPL-SCNC: 100 MMOL/L (ref 99–110)
CHOLEST SERPL-MCNC: 185 MG/DL (ref 0–199)
CO2 SERPL-SCNC: 28 MMOL/L (ref 21–32)
CREAT SERPL-MCNC: 1.1 MG/DL (ref 0.6–1.2)
DEPRECATED RDW RBC AUTO: 13.9 % (ref 12.4–15.4)
EOSINOPHIL # BLD: 0.2 K/UL (ref 0–0.6)
EOSINOPHIL NFR BLD: 3.5 %
EST. AVERAGE GLUCOSE BLD GHB EST-MCNC: 119.8 MG/DL
GFR SERPLBLD CREATININE-BSD FMLA CKD-EPI: 50 ML/MIN/{1.73_M2}
GLUCOSE SERPL-MCNC: 103 MG/DL (ref 70–99)
HBA1C MFR BLD: 5.8 %
HCT VFR BLD AUTO: 45.2 % (ref 36–48)
HDLC SERPL-MCNC: 52 MG/DL (ref 40–60)
HGB BLD-MCNC: 15 G/DL (ref 12–16)
LDLC SERPL CALC-MCNC: ABNORMAL MG/DL
LDLC SERPL-MCNC: 88 MG/DL
LYMPHOCYTES # BLD: 1.4 K/UL (ref 1–5.1)
LYMPHOCYTES NFR BLD: 23.6 %
MCH RBC QN AUTO: 33.2 PG (ref 26–34)
MCHC RBC AUTO-ENTMCNC: 33.2 G/DL (ref 31–36)
MCV RBC AUTO: 100.2 FL (ref 80–100)
MONOCYTES # BLD: 0.6 K/UL (ref 0–1.3)
MONOCYTES NFR BLD: 10 %
NEUTROPHILS # BLD: 3.7 K/UL (ref 1.7–7.7)
NEUTROPHILS NFR BLD: 61.8 %
PLATELET # BLD AUTO: 166 K/UL (ref 135–450)
PMV BLD AUTO: 10.6 FL (ref 5–10.5)
POTASSIUM SERPL-SCNC: 4.2 MMOL/L (ref 3.5–5.1)
PROT SERPL-MCNC: 7.3 G/DL (ref 6.4–8.2)
RBC # BLD AUTO: 4.51 M/UL (ref 4–5.2)
SODIUM SERPL-SCNC: 143 MMOL/L (ref 136–145)
TRIGL SERPL-MCNC: 309 MG/DL (ref 0–150)
TSH SERPL DL<=0.005 MIU/L-ACNC: 1.75 UIU/ML (ref 0.27–4.2)
VLDLC SERPL CALC-MCNC: ABNORMAL MG/DL
WBC # BLD AUTO: 6 K/UL (ref 4–11)

## 2023-06-20 PROCEDURE — 80053 COMPREHEN METABOLIC PANEL: CPT

## 2023-06-20 PROCEDURE — 36415 COLL VENOUS BLD VENIPUNCTURE: CPT

## 2023-06-20 PROCEDURE — 85025 COMPLETE CBC W/AUTO DIFF WBC: CPT

## 2023-06-20 PROCEDURE — 84443 ASSAY THYROID STIM HORMONE: CPT

## 2023-06-20 PROCEDURE — 83036 HEMOGLOBIN GLYCOSYLATED A1C: CPT

## 2023-06-20 PROCEDURE — 80061 LIPID PANEL: CPT

## 2023-07-10 RX ORDER — BISOPROLOL FUMARATE AND HYDROCHLOROTHIAZIDE 10; 6.25 MG/1; MG/1
TABLET ORAL
Qty: 180 TABLET | Refills: 3 | Status: SHIPPED | OUTPATIENT
Start: 2023-07-10

## 2023-07-10 NOTE — TELEPHONE ENCOUNTER
Requested Prescriptions     Pending Prescriptions Disp Refills    bisoprolol-hydroCHLOROthiazide (ZIAC) 10-6.25 MG per tablet [Pharmacy Med Name: BISOPROLOL/HCTZ TABS 10/6.25MG] 180 tablet 3     Sig: TAKE 2 TABLETS DAILY              Last Office Visit: 12/8/2022     Next Office Visit: 7/13/2023

## 2023-07-11 ASSESSMENT — ENCOUNTER SYMPTOMS
BACK PAIN: 0
WHEEZING: 0
VOMITING: 0
CHEST TIGHTNESS: 0
FACIAL SWELLING: 0
BLOOD IN STOOL: 0
ABDOMINAL PAIN: 0
COLOR CHANGE: 0
COUGH: 0
ABDOMINAL DISTENTION: 0
SHORTNESS OF BREATH: 0
EYE DISCHARGE: 0

## 2023-07-11 NOTE — PROGRESS NOTES
[address]     Outpatient Cardiology         Chief Complaint   Patient presents with    Coronary Artery Disease       HPI      Markell Krishna a 81 y.o. female here for follow up for HLD-would like to discuss Levqio side effects. Hx of CAD,HTN,HLD. CAD, on aspirin 81 mg   Hypertension, on ziac 20-12.50 mg daily, Avapro 300 mg daily, Microzide 12.5 mg daily, well-controlled today. Attending PT. Does complain of occasional episodes of shortness of breath with exertion. Today we discussed getting a BNP and a repeat echocardiogram to make sure she has not developed diastolic dysfunction. In the past she did have LVH. Hyperlipidemia, her goal LDL is around 55. Last LDL was 88. She does have a history of prior PCI more than 10 years ago. Currently on repatha, has tried Sheree Teresa in the past but stopped due to side effects. Overall feels okay from a cardiovascular perspective with occasional episodes of shortness of breath.       PMH  Past Medical History:   Diagnosis Date    Acquired spondylolisthesis of lumbosacral region     Bilateral primary osteoarthritis of hip     CAD (coronary artery disease)     one stent in heart    Chronic bilateral low back pain without sciatica 2/8/2018    CKD (chronic kidney disease), stage III (HCC)     DDD (degenerative disc disease), lumbar     Dr. Hurt      Dr. Bayron Flores    GERD (gastroesophageal reflux disease)     Headache(784.0)     Heel spur 08/2016    History of lumbar spinal fusion     Hyperlipidemia     Hypertension     IBS (irritable bowel syndrome)     Lyme disease 2009    suspected    Medical history reviewed with no changes     MVP (mitral valve prolapse)     Neuroma nos     Osteoarthritis     Osteopenia     Post-laminectomy syndrome     Rotator cuff tear     Dr. Motta Loss    Squamous cell cancer of skin of right hand 05/2018    squamous cell carcinoma    Stress incontinence, female     Urinary incontinence

## 2023-07-13 ENCOUNTER — OFFICE VISIT (OUTPATIENT)
Dept: CARDIOLOGY CLINIC | Age: 83
End: 2023-07-13
Payer: MEDICARE

## 2023-07-13 VITALS
SYSTOLIC BLOOD PRESSURE: 130 MMHG | DIASTOLIC BLOOD PRESSURE: 80 MMHG | BODY MASS INDEX: 35.39 KG/M2 | WEIGHT: 206.2 LBS | HEART RATE: 66 BPM

## 2023-07-13 DIAGNOSIS — I25.10 CORONARY ARTERY DISEASE INVOLVING NATIVE CORONARY ARTERY OF NATIVE HEART WITHOUT ANGINA PECTORIS: Primary | ICD-10-CM

## 2023-07-13 DIAGNOSIS — I50.9 CONGESTIVE HEART FAILURE, UNSPECIFIED HF CHRONICITY, UNSPECIFIED HEART FAILURE TYPE (HCC): ICD-10-CM

## 2023-07-13 DIAGNOSIS — R06.02 SHORTNESS OF BREATH: ICD-10-CM

## 2023-07-13 DIAGNOSIS — I10 BENIGN ESSENTIAL HTN: ICD-10-CM

## 2023-07-13 DIAGNOSIS — E78.2 HYPERLIPIDEMIA, MIXED: ICD-10-CM

## 2023-07-13 PROCEDURE — G8399 PT W/DXA RESULTS DOCUMENT: HCPCS | Performed by: INTERNAL MEDICINE

## 2023-07-13 PROCEDURE — 3079F DIAST BP 80-89 MM HG: CPT | Performed by: INTERNAL MEDICINE

## 2023-07-13 PROCEDURE — 1036F TOBACCO NON-USER: CPT | Performed by: INTERNAL MEDICINE

## 2023-07-13 PROCEDURE — G8417 CALC BMI ABV UP PARAM F/U: HCPCS | Performed by: INTERNAL MEDICINE

## 2023-07-13 PROCEDURE — 1090F PRES/ABSN URINE INCON ASSESS: CPT | Performed by: INTERNAL MEDICINE

## 2023-07-13 PROCEDURE — 1123F ACP DISCUSS/DSCN MKR DOCD: CPT | Performed by: INTERNAL MEDICINE

## 2023-07-13 PROCEDURE — 3075F SYST BP GE 130 - 139MM HG: CPT | Performed by: INTERNAL MEDICINE

## 2023-07-13 PROCEDURE — G8427 DOCREV CUR MEDS BY ELIG CLIN: HCPCS | Performed by: INTERNAL MEDICINE

## 2023-07-13 PROCEDURE — 99214 OFFICE O/P EST MOD 30 MIN: CPT | Performed by: INTERNAL MEDICINE

## 2023-07-13 PROCEDURE — 93000 ELECTROCARDIOGRAM COMPLETE: CPT | Performed by: INTERNAL MEDICINE

## 2023-07-13 NOTE — ASSESSMENT & PLAN NOTE
Previous stress test within normal limits.   Repeat echo and BNP, would like to make sure she has not developed diastolic dysfunction  Consider adding Jardiance and transitioning to [facility] if needed

## 2023-07-14 DIAGNOSIS — I10 BENIGN ESSENTIAL HTN: ICD-10-CM

## 2023-07-14 DIAGNOSIS — N18.31 STAGE 3A CHRONIC KIDNEY DISEASE (HCC): Primary | ICD-10-CM

## 2023-07-14 DIAGNOSIS — I25.10 CORONARY ARTERY DISEASE INVOLVING NATIVE CORONARY ARTERY OF NATIVE HEART WITHOUT ANGINA PECTORIS: ICD-10-CM

## 2023-07-14 LAB — NT-PROBNP SERPL-MCNC: 850 PG/ML (ref 0–449)

## 2023-07-17 ENCOUNTER — TELEPHONE (OUTPATIENT)
Dept: CARDIOLOGY CLINIC | Age: 83
End: 2023-07-17

## 2023-07-17 NOTE — TELEPHONE ENCOUNTER
The patient called stating she has some concerns about taking Jardiance. The patient states that she read that Delorise Evan is harsh on the kidney's and she has Stage 3 Kidney disease. The patient also states she can not afford the medication. Please call the patient back at [phone] to advise.

## 2023-07-18 NOTE — TELEPHONE ENCOUNTER
Spoke with patient, she does not feel comfortable taking jardiance, she would like to get her echocardiogram done first. Let her know this is reasonable. Patient concerned about her kidney disease and would like to see if rehab helps her shortness of breath. Will discontinue jardiance for now and readdress after echo.

## 2023-07-24 RX ORDER — IRBESARTAN 300 MG/1
TABLET ORAL
Qty: 90 TABLET | Refills: 3 | Status: SHIPPED | OUTPATIENT
Start: 2023-07-24

## 2023-07-24 NOTE — TELEPHONE ENCOUNTER
Requested Prescriptions     Pending Prescriptions Disp Refills    irbesartan (AVAPRO) 300 MG tablet [Pharmacy Med Name: IRBESARTAN TABS 300MG] 90 tablet 3     Sig: TAKE 1 TABLET DAILY          Last Office Visit: 7/13/2023     Next Office Visit: 7/15/24

## 2023-08-16 RX ORDER — GABAPENTIN 300 MG/1
CAPSULE ORAL
Qty: 90 CAPSULE | Refills: 3 | Status: SHIPPED | OUTPATIENT
Start: 2023-08-16 | End: 2024-08-16

## 2023-08-18 ENCOUNTER — HOSPITAL ENCOUNTER (OUTPATIENT)
Dept: NON INVASIVE DIAGNOSTICS | Age: 83
Discharge: HOME | End: 2023-08-18
Payer: MEDICARE

## 2023-08-18 DIAGNOSIS — R06.02 SHORTNESS OF BREATH: ICD-10-CM

## 2023-08-18 LAB
LV EF: 58 %
LVEF MODALITY: NORMAL

## 2023-08-18 PROCEDURE — C8929 TTE W OR WO FOL WCON,DOPPLER: HCPCS

## 2023-10-09 RX ORDER — HYDROCHLOROTHIAZIDE 12.5 MG/1
12.5 CAPSULE, GELATIN COATED ORAL DAILY
Qty: 90 CAPSULE | Refills: 3 | Status: SHIPPED | OUTPATIENT
Start: 2023-10-09

## 2023-12-21 DIAGNOSIS — I25.10 CORONARY ARTERY DISEASE INVOLVING NATIVE CORONARY ARTERY OF NATIVE HEART WITHOUT ANGINA PECTORIS: ICD-10-CM

## 2023-12-21 DIAGNOSIS — R73.01 IMPAIRED FASTING GLUCOSE: ICD-10-CM

## 2023-12-21 DIAGNOSIS — I10 BENIGN ESSENTIAL HTN: ICD-10-CM

## 2023-12-21 DIAGNOSIS — N18.31 STAGE 3A CHRONIC KIDNEY DISEASE (HCC): ICD-10-CM

## 2023-12-21 DIAGNOSIS — E78.2 HYPERLIPIDEMIA, MIXED: ICD-10-CM

## 2023-12-21 LAB
BASOPHILS # BLD: 0.1 K/UL (ref 0–0.2)
BASOPHILS NFR BLD: 1.2 %
DEPRECATED RDW RBC AUTO: 14.4 % (ref 12.4–15.4)
EOSINOPHIL # BLD: 0.2 K/UL (ref 0–0.6)
EOSINOPHIL NFR BLD: 3.1 %
HCT VFR BLD AUTO: 44.7 % (ref 36–48)
HGB BLD-MCNC: 15 G/DL (ref 12–16)
LYMPHOCYTES # BLD: 1.5 K/UL (ref 1–5.1)
LYMPHOCYTES NFR BLD: 23 %
MCH RBC QN AUTO: 33.2 PG (ref 26–34)
MCHC RBC AUTO-ENTMCNC: 33.6 G/DL (ref 31–36)
MCV RBC AUTO: 98.8 FL (ref 80–100)
MONOCYTES # BLD: 0.7 K/UL (ref 0–1.3)
MONOCYTES NFR BLD: 10.1 %
NEUTROPHILS # BLD: 4.2 K/UL (ref 1.7–7.7)
NEUTROPHILS NFR BLD: 62.6 %
PLATELET # BLD AUTO: 196 K/UL (ref 135–450)
PMV BLD AUTO: 10.3 FL (ref 5–10.5)
RBC # BLD AUTO: 4.53 M/UL (ref 4–5.2)
WBC # BLD AUTO: 6.6 K/UL (ref 4–11)

## 2023-12-22 LAB
ALBUMIN SERPL-MCNC: 4.5 G/DL (ref 3.4–5)
ALBUMIN/GLOB SERPL: 1.5 {RATIO} (ref 1.1–2.2)
ALT SERPL-CCNC: 16 U/L (ref 10–40)
ANION GAP SERPL CALCULATED.3IONS-SCNC: 11 MMOL/L (ref 3–16)
AST SERPL-CCNC: 25 U/L (ref 15–37)
BILIRUB SERPL-MCNC: 0.3 MG/DL (ref 0–1)
BUN SERPL-MCNC: 31 MG/DL (ref 7–20)
CALCIUM SERPL-MCNC: 9.6 MG/DL (ref 8.3–10.6)
CHLORIDE SERPL-SCNC: 99 MMOL/L (ref 99–110)
CHOLEST SERPL-MCNC: 281 MG/DL (ref 0–199)
CO2 SERPL-SCNC: 29 MMOL/L (ref 21–32)
CREAT SERPL-MCNC: 1.3 MG/DL (ref 0.6–1.2)
EST. AVERAGE GLUCOSE BLD GHB EST-MCNC: 116.9 MG/DL
GFR SERPLBLD CREATININE-BSD FMLA CKD-EPI: 41 ML/MIN/{1.73_M2}
GLUCOSE SERPL-MCNC: 90 MG/DL (ref 70–99)
HBA1C MFR BLD: 5.7 %
HDLC SERPL-MCNC: 39 MG/DL (ref 40–60)
LDLC SERPL CALC-MCNC: ABNORMAL MG/DL
LDLC SERPL-MCNC: 153 MG/DL
POTASSIUM SERPL-SCNC: 4.8 MMOL/L (ref 3.5–5.1)
PROT SERPL-MCNC: 7.5 G/DL (ref 6.4–8.2)
SODIUM SERPL-SCNC: 139 MMOL/L (ref 136–145)
TRIGL SERPL-MCNC: 547 MG/DL (ref 0–150)
VLDLC SERPL CALC-MCNC: ABNORMAL MG/DL

## 2024-02-12 ENCOUNTER — PATIENT MESSAGE (OUTPATIENT)
Dept: INTERNAL MEDICINE CLINIC | Age: 84
End: 2024-02-12

## 2024-02-12 DIAGNOSIS — M25.552 BILATERAL HIP PAIN: ICD-10-CM

## 2024-02-12 DIAGNOSIS — M54.50 CHRONIC BILATERAL LOW BACK PAIN WITHOUT SCIATICA: ICD-10-CM

## 2024-02-12 DIAGNOSIS — M47.816 LUMBAR FACET ARTHROPATHY: ICD-10-CM

## 2024-02-12 DIAGNOSIS — G89.29 CHRONIC BILATERAL LOW BACK PAIN WITHOUT SCIATICA: ICD-10-CM

## 2024-02-12 DIAGNOSIS — M25.551 BILATERAL HIP PAIN: ICD-10-CM

## 2024-02-12 DIAGNOSIS — M48.061 SPINAL STENOSIS OF LUMBAR REGION WITHOUT NEUROGENIC CLAUDICATION: ICD-10-CM

## 2024-02-12 RX ORDER — TRAMADOL HYDROCHLORIDE 50 MG/1
50 TABLET ORAL EVERY 6 HOURS PRN
Qty: 360 TABLET | Refills: 0 | Status: SHIPPED | OUTPATIENT
Start: 2024-02-12 | End: 2024-05-12

## 2024-02-12 NOTE — TELEPHONE ENCOUNTER
From: Heidi Rogel  To: Dr. Hill James  Sent: 2/12/2024 2:14 PM EST  Subject: Refill Tramadol    This medication has dropped off the list of the ones I can renew automatically.    I am experimenting with using Tylenol Arthritis-8 hr. per your instruction so that I won't have to take Tramadol more often. Now I am completely out of Tramadol. My mistake, I thought I had a back-up bottle, but I didn't.  Please prescribe a refill from [facility] by e-scribe, their preferred method.  Thank you.  Heidi Rogel 1940

## 2024-03-21 NOTE — PROGRESS NOTES
Procedural dressing dry and intact. Bilateral lower extremities equal in strength. Discharge instructions reviewed with patient or responsible adult, signed and copy given. All home medications have been reviewed. All questions answered and patient or responsible adult verbalized understanding.   PAIN LEVEL AT DISCHARGE __0___
no loss of consciousness, no gait abnormality, no headache, no sensory deficits, and no weakness.

## 2024-05-01 ENCOUNTER — TELEPHONE (OUTPATIENT)
Dept: INTERNAL MEDICINE CLINIC | Age: 84
End: 2024-05-01

## 2024-05-01 NOTE — TELEPHONE ENCOUNTER
LVM for pt to call back and schedule pre op, patient having surgery on 6.18.24 with Dr. Valentina Allen at [place] for Spinal Cord Stimulator Implant ( replacement).     Paper work in MA folder out front

## 2024-06-06 ENCOUNTER — OFFICE VISIT (OUTPATIENT)
Dept: INTERNAL MEDICINE CLINIC | Age: 84
End: 2024-06-06
Payer: MEDICARE

## 2024-06-06 VITALS
OXYGEN SATURATION: 97 % | TEMPERATURE: 97.1 F | BODY MASS INDEX: 33.29 KG/M2 | WEIGHT: 195 LBS | HEIGHT: 64 IN | HEART RATE: 63 BPM | SYSTOLIC BLOOD PRESSURE: 136 MMHG | DIASTOLIC BLOOD PRESSURE: 84 MMHG

## 2024-06-06 DIAGNOSIS — N18.31 STAGE 3A CHRONIC KIDNEY DISEASE (HCC): ICD-10-CM

## 2024-06-06 DIAGNOSIS — G89.29 CHRONIC BILATERAL LOW BACK PAIN WITHOUT SCIATICA: ICD-10-CM

## 2024-06-06 DIAGNOSIS — E78.2 HYPERLIPIDEMIA, MIXED: ICD-10-CM

## 2024-06-06 DIAGNOSIS — I25.10 CORONARY ARTERY DISEASE INVOLVING NATIVE CORONARY ARTERY OF NATIVE HEART WITHOUT ANGINA PECTORIS: ICD-10-CM

## 2024-06-06 DIAGNOSIS — I10 BENIGN ESSENTIAL HTN: ICD-10-CM

## 2024-06-06 DIAGNOSIS — M54.50 CHRONIC BILATERAL LOW BACK PAIN WITHOUT SCIATICA: ICD-10-CM

## 2024-06-06 DIAGNOSIS — R73.01 IMPAIRED FASTING GLUCOSE: ICD-10-CM

## 2024-06-06 DIAGNOSIS — Z01.818 PREOP EXAM FOR INTERNAL MEDICINE: ICD-10-CM

## 2024-06-06 DIAGNOSIS — I50.9 CONGESTIVE HEART FAILURE, UNSPECIFIED HF CHRONICITY, UNSPECIFIED HEART FAILURE TYPE (HCC): ICD-10-CM

## 2024-06-06 DIAGNOSIS — Z96.89 SPINAL CORD STIMULATOR STATUS: ICD-10-CM

## 2024-06-06 DIAGNOSIS — Z01.818 PREOP EXAM FOR INTERNAL MEDICINE: Primary | ICD-10-CM

## 2024-06-06 PROCEDURE — 1090F PRES/ABSN URINE INCON ASSESS: CPT | Performed by: INTERNAL MEDICINE

## 2024-06-06 PROCEDURE — 1036F TOBACCO NON-USER: CPT | Performed by: INTERNAL MEDICINE

## 2024-06-06 PROCEDURE — 93000 ELECTROCARDIOGRAM COMPLETE: CPT | Performed by: INTERNAL MEDICINE

## 2024-06-06 PROCEDURE — G8427 DOCREV CUR MEDS BY ELIG CLIN: HCPCS | Performed by: INTERNAL MEDICINE

## 2024-06-06 PROCEDURE — G8417 CALC BMI ABV UP PARAM F/U: HCPCS | Performed by: INTERNAL MEDICINE

## 2024-06-06 PROCEDURE — 3079F DIAST BP 80-89 MM HG: CPT | Performed by: INTERNAL MEDICINE

## 2024-06-06 PROCEDURE — G8399 PT W/DXA RESULTS DOCUMENT: HCPCS | Performed by: INTERNAL MEDICINE

## 2024-06-06 PROCEDURE — 1123F ACP DISCUSS/DSCN MKR DOCD: CPT | Performed by: INTERNAL MEDICINE

## 2024-06-06 PROCEDURE — 3075F SYST BP GE 130 - 139MM HG: CPT | Performed by: INTERNAL MEDICINE

## 2024-06-06 PROCEDURE — 99214 OFFICE O/P EST MOD 30 MIN: CPT | Performed by: INTERNAL MEDICINE

## 2024-06-06 RX ORDER — AMOXICILLIN 500 MG/1
500 CAPSULE ORAL 3 TIMES DAILY
COMMUNITY
Start: 2024-06-05 | End: 2024-06-15

## 2024-06-06 ASSESSMENT — PATIENT HEALTH QUESTIONNAIRE - PHQ9
SUM OF ALL RESPONSES TO PHQ QUESTIONS 1-9: 0
1. LITTLE INTEREST OR PLEASURE IN DOING THINGS: NOT AT ALL
SUM OF ALL RESPONSES TO PHQ QUESTIONS 1-9: 0
2. FEELING DOWN, DEPRESSED OR HOPELESS: NOT AT ALL
SUM OF ALL RESPONSES TO PHQ9 QUESTIONS 1 & 2: 0

## 2024-06-06 NOTE — PROGRESS NOTES
(36.2 °C)   TempSrc: Temporal   SpO2: 97%   Weight: 88.5 kg (195 lb)   Height: 1.626 m (5' 4\")     Body mass index is 33.47 kg/m².     Wt Readings from Last 3 Encounters:   06/06/24 88.5 kg (195 lb)   12/21/23 91.6 kg (202 lb)   07/13/23 93.5 kg (206 lb 3.2 oz)     BP Readings from Last 3 Encounters:   06/06/24 136/84   12/21/23 118/82   07/13/23 130/80        GEN:  84 y.o. female who is in NAD, A&O.  She appears stated age and well nourished.  She is cooperative and pleasant.    HEAD:  NC/AT, no lesions.  EYES:  KANG, EOMI, No scleral icterus or conjunctival injection or discharge.  + OU cataracts, + OU Ptosis.    EARS:  EAC's clear, TM's normal.  NOSE:  Nasal cavity is clear.  No mucosal congestion or discharge.  Sinuses are nontender.  MOUTH & THROAT:  Oral cavity is clear without mucosal lesions.  Tongue is midline.  Dentition is in good repair.  No pharyngeal erythema or exudate.  NECK:  Supple.  Full ROM.  Trachea is midline.  No increased JVD.  No thyromegaly or nodules.  No masses  LYMPH: No C/SC/A/F nodes  CARDIAC:  S1S2 NL.  Regular rhythm.   No murmur/clicks/rubs.  No ectopy.    VASC:  Pedal pulses 2/4.  Carotid upstrokes 2+.  No bruits noted.    PULM:  Lungs are CTA.  Symmetric breath sounds noted.  AP Diameter NL.  GI:  Abdomen is soft and nontender.  No distension.  No organomegaly.  No masses.  No pulsatile masses.    EXT:  No Cyanosis or clubbing.  No edema.    SKIN: Warm and dry, normal turgor, no rash or lesions of concern.  NEURO:  No focal deficits.    PSYCH:  Mood and affect NL.  Judgement and insight NL.      Pre-Operative Risk assessment using 2014 ACC/AHA guidelines     Emergent procedure No  Active Cardiac Condition No (decompensated HF, Arrhythmia, MI <3 weeks, severe valve disease)  Risk Level of Procedure Low Risk (endoscopy, superficial skin, breast, ambulatory, or cataract, etc.)  Revised Cardiac Risk Index Risk factors: History of ischemic heart disease  Measurement of Exercise

## 2024-06-07 LAB
ALBUMIN SERPL-MCNC: 4 G/DL (ref 3.4–5)
ALBUMIN/GLOB SERPL: 1.3 {RATIO} (ref 1.1–2.2)
ALP SERPL-CCNC: 85 U/L (ref 40–129)
ALT SERPL-CCNC: 16 U/L (ref 10–40)
ANION GAP SERPL CALCULATED.3IONS-SCNC: 16 MMOL/L (ref 3–16)
AST SERPL-CCNC: 21 U/L (ref 15–37)
BASOPHILS # BLD: 0.1 K/UL (ref 0–0.2)
BASOPHILS NFR BLD: 1 %
BILIRUB SERPL-MCNC: 0.3 MG/DL (ref 0–1)
BUN SERPL-MCNC: 31 MG/DL (ref 7–20)
CALCIUM SERPL-MCNC: 9.6 MG/DL (ref 8.3–10.6)
CHLORIDE SERPL-SCNC: 102 MMOL/L (ref 99–110)
CHOLEST SERPL-MCNC: 293 MG/DL (ref 0–199)
CO2 SERPL-SCNC: 22 MMOL/L (ref 21–32)
CREAT SERPL-MCNC: 1.1 MG/DL (ref 0.6–1.2)
DEPRECATED RDW RBC AUTO: 14.3 % (ref 12.4–15.4)
EOSINOPHIL # BLD: 0.2 K/UL (ref 0–0.6)
EOSINOPHIL NFR BLD: 3 %
EST. AVERAGE GLUCOSE BLD GHB EST-MCNC: 119.8 MG/DL
GFR SERPLBLD CREATININE-BSD FMLA CKD-EPI: 50 ML/MIN/{1.73_M2}
GLUCOSE SERPL-MCNC: 81 MG/DL (ref 70–99)
HBA1C MFR BLD: 5.8 %
HCT VFR BLD AUTO: 47.5 % (ref 36–48)
HDLC SERPL-MCNC: 52 MG/DL (ref 40–60)
HGB BLD-MCNC: 15.5 G/DL (ref 12–16)
LDLC SERPL CALC-MCNC: ABNORMAL MG/DL
LDLC SERPL-MCNC: 210 MG/DL
LYMPHOCYTES # BLD: 1.2 K/UL (ref 1–5.1)
LYMPHOCYTES NFR BLD: 18.6 %
MCH RBC QN AUTO: 32.8 PG (ref 26–34)
MCHC RBC AUTO-ENTMCNC: 32.5 G/DL (ref 31–36)
MCV RBC AUTO: 100.9 FL (ref 80–100)
MONOCYTES # BLD: 0.5 K/UL (ref 0–1.3)
MONOCYTES NFR BLD: 7.8 %
NEUTROPHILS # BLD: 4.6 K/UL (ref 1.7–7.7)
NEUTROPHILS NFR BLD: 69.6 %
PLATELET # BLD AUTO: 175 K/UL (ref 135–450)
PMV BLD AUTO: 10 FL (ref 5–10.5)
POTASSIUM SERPL-SCNC: 4.6 MMOL/L (ref 3.5–5.1)
PROT SERPL-MCNC: 7 G/DL (ref 6.4–8.2)
RBC # BLD AUTO: 4.71 M/UL (ref 4–5.2)
SODIUM SERPL-SCNC: 140 MMOL/L (ref 136–145)
TRIGL SERPL-MCNC: 310 MG/DL (ref 0–150)
TSH SERPL DL<=0.005 MIU/L-ACNC: 1.15 UIU/ML (ref 0.27–4.2)
VLDLC SERPL CALC-MCNC: ABNORMAL MG/DL
WBC # BLD AUTO: 6.6 K/UL (ref 4–11)

## 2024-06-12 DIAGNOSIS — M47.816 LUMBAR FACET ARTHROPATHY: ICD-10-CM

## 2024-06-12 DIAGNOSIS — M25.551 BILATERAL HIP PAIN: ICD-10-CM

## 2024-06-12 DIAGNOSIS — M48.061 SPINAL STENOSIS OF LUMBAR REGION WITHOUT NEUROGENIC CLAUDICATION: ICD-10-CM

## 2024-06-12 DIAGNOSIS — M54.50 CHRONIC BILATERAL LOW BACK PAIN WITHOUT SCIATICA: ICD-10-CM

## 2024-06-12 DIAGNOSIS — M25.552 BILATERAL HIP PAIN: ICD-10-CM

## 2024-06-12 DIAGNOSIS — G89.29 CHRONIC BILATERAL LOW BACK PAIN WITHOUT SCIATICA: ICD-10-CM

## 2024-06-13 RX ORDER — TRAMADOL HYDROCHLORIDE 50 MG/1
50 TABLET ORAL EVERY 6 HOURS PRN
Qty: 360 TABLET | Refills: 0 | Status: SHIPPED | OUTPATIENT
Start: 2024-06-13 | End: 2024-09-11

## 2024-06-17 ENCOUNTER — HOSPITAL ENCOUNTER (OUTPATIENT)
Age: 84
Discharge: HOME | End: 2024-06-17
Payer: MEDICARE

## 2024-06-17 LAB
INR PPP: 0.88 (ref 0.85–1.15)
PROTHROMBIN TIME: 12.2 SEC (ref 11.9–14.9)

## 2024-06-17 PROCEDURE — 87081 CULTURE SCREEN ONLY: CPT

## 2024-06-17 PROCEDURE — 85610 PROTHROMBIN TIME: CPT

## 2024-06-17 PROCEDURE — 36415 COLL VENOUS BLD VENIPUNCTURE: CPT

## 2024-06-19 LAB — MRSA SPEC QL CULT: NORMAL

## 2024-07-05 RX ORDER — BISOPROLOL FUMARATE AND HYDROCHLOROTHIAZIDE 10; 6.25 MG/1; MG/1
TABLET ORAL
Qty: 180 TABLET | Refills: 3 | Status: SHIPPED | OUTPATIENT
Start: 2024-07-05

## 2024-07-12 NOTE — PROGRESS NOTES
[facility]     Outpatient Cardiology         Chief Complaint   Patient presents with    Coronary Artery Disease       HPI      Heidi Montoya a 84 y.o. female here for follow up for CAD, HTN, and HLD.     Hx of CAD-PCI to LAD to 2002,HTN,HLD, LVH, CKD.     CAD, on aspirin 81 mg, no angina  Hypertension, on ziac 20-12.50 mg daily, Avapro 300 mg daily, Microzide 12.5 mg daily, well-controlled.  No side effects  Hyperlipidemia, last  (6/6/24) , today we discussed trying Nexlizet, heart side effects in the past with statins/Leqvio/Repatha.  She does have a history of prior PCI more than 10 years ago.  No angina        PMH  Past Medical History:   Diagnosis Date    Acquired spondylolisthesis of lumbosacral region     Bilateral primary osteoarthritis of hip     CAD (coronary artery disease)     one stent in heart    Chronic bilateral low back pain without sciatica 2/8/2018    CKD (chronic kidney disease), stage III (HCC)     DDD (degenerative disc disease), lumbar     Dr. Noel    Fibromyalgia     Dr. James    GERD (gastroesophageal reflux disease)     Headache(784.0)     Heel spur 08/2016    History of lumbar spinal fusion     Hyperlipidemia     Hypertension     IBS (irritable bowel syndrome)     Lyme disease 2009    suspected    Medical history reviewed with no changes     MVP (mitral valve prolapse)     Neuroma nos     Osteoarthritis     Osteopenia     Post-laminectomy syndrome     Rotator cuff tear     Dr. Cortes    Squamous cell cancer of skin of right hand 05/2018    squamous cell carcinoma    Stress incontinence, female     Urinary incontinence        PSH  Past Surgical History:   Procedure Laterality Date    BREAST SURGERY      cyst removal    COLONOSCOPY  2006    CORONARY ANGIOPLASTY WITH STENT PLACEMENT  2002    stent, LAD    HC INJECTION PROCEDURE FOR SACROILIAC JOINT Bilateral 07/17/2020    BILATERAL SACROILIAC JOINT INJECTION WITH FLUOROSCOPY performed by Valentina JUDD

## 2024-07-15 ENCOUNTER — OFFICE VISIT (OUTPATIENT)
Dept: CARDIOLOGY CLINIC | Age: 84
End: 2024-07-15
Payer: MEDICARE

## 2024-07-15 VITALS
DIASTOLIC BLOOD PRESSURE: 70 MMHG | BODY MASS INDEX: 33.71 KG/M2 | SYSTOLIC BLOOD PRESSURE: 110 MMHG | OXYGEN SATURATION: 96 % | HEART RATE: 78 BPM | WEIGHT: 196.4 LBS

## 2024-07-15 DIAGNOSIS — I50.9 CONGESTIVE HEART FAILURE, UNSPECIFIED HF CHRONICITY, UNSPECIFIED HEART FAILURE TYPE (HCC): ICD-10-CM

## 2024-07-15 DIAGNOSIS — I25.10 CORONARY ARTERY DISEASE INVOLVING NATIVE CORONARY ARTERY OF NATIVE HEART WITHOUT ANGINA PECTORIS: ICD-10-CM

## 2024-07-15 DIAGNOSIS — I10 BENIGN ESSENTIAL HTN: ICD-10-CM

## 2024-07-15 DIAGNOSIS — E78.2 HYPERLIPIDEMIA, MIXED: Primary | ICD-10-CM

## 2024-07-15 PROCEDURE — 99214 OFFICE O/P EST MOD 30 MIN: CPT | Performed by: INTERNAL MEDICINE

## 2024-07-15 PROCEDURE — G8417 CALC BMI ABV UP PARAM F/U: HCPCS | Performed by: INTERNAL MEDICINE

## 2024-07-15 PROCEDURE — 3074F SYST BP LT 130 MM HG: CPT | Performed by: INTERNAL MEDICINE

## 2024-07-15 PROCEDURE — 1090F PRES/ABSN URINE INCON ASSESS: CPT | Performed by: INTERNAL MEDICINE

## 2024-07-15 PROCEDURE — 1123F ACP DISCUSS/DSCN MKR DOCD: CPT | Performed by: INTERNAL MEDICINE

## 2024-07-15 PROCEDURE — G8399 PT W/DXA RESULTS DOCUMENT: HCPCS | Performed by: INTERNAL MEDICINE

## 2024-07-15 PROCEDURE — G8427 DOCREV CUR MEDS BY ELIG CLIN: HCPCS | Performed by: INTERNAL MEDICINE

## 2024-07-15 PROCEDURE — 3078F DIAST BP <80 MM HG: CPT | Performed by: INTERNAL MEDICINE

## 2024-07-15 PROCEDURE — 1036F TOBACCO NON-USER: CPT | Performed by: INTERNAL MEDICINE

## 2024-07-16 NOTE — TELEPHONE ENCOUNTER
Requested Prescriptions     Pending Prescriptions Disp Refills    irbesartan (AVAPRO) 300 MG tablet [Pharmacy Med Name: IRBESARTAN TABS 300MG] 90 tablet 3     Sig: TAKE 1 TABLET DAILY            Checked Correct Pharmacy: Yes    Any changes since last refill? No     Number: 90    Refills: 3    Last Office Visit: 7/15/2024     Next Office Visit:07.07.2025      Last Labs: 06.06.2024

## 2024-07-23 RX ORDER — IRBESARTAN 300 MG/1
TABLET ORAL
Qty: 90 TABLET | Refills: 3 | Status: SHIPPED | OUTPATIENT
Start: 2024-07-23

## 2024-08-15 RX ORDER — GABAPENTIN 300 MG/1
CAPSULE ORAL
Qty: 90 CAPSULE | Refills: 3 | Status: SHIPPED | OUTPATIENT
Start: 2024-08-15 | End: 2025-08-16

## 2024-08-15 NOTE — TELEPHONE ENCOUNTER
Last appointment: 6/6/2024  Next appointment: 9/12/2024  Last refill: 8/16/23    Requested Prescriptions     Pending Prescriptions Disp Refills    gabapentin (NEURONTIN) 300 MG capsule [Pharmacy Med Name: GABAPENTIN 300MG CAPSULES] 90 capsule 3     Sig: TAKE 1 CAPSULE BY MOUTH EVERY NIGHT

## 2024-09-09 SDOH — ECONOMIC STABILITY: INCOME INSECURITY: HOW HARD IS IT FOR YOU TO PAY FOR THE VERY BASICS LIKE FOOD, HOUSING, MEDICAL CARE, AND HEATING?: NOT VERY HARD

## 2024-09-09 SDOH — ECONOMIC STABILITY: FOOD INSECURITY: WITHIN THE PAST 12 MONTHS, YOU WORRIED THAT YOUR FOOD WOULD RUN OUT BEFORE YOU GOT MONEY TO BUY MORE.: NEVER TRUE

## 2024-09-09 SDOH — ECONOMIC STABILITY: FOOD INSECURITY: WITHIN THE PAST 12 MONTHS, THE FOOD YOU BOUGHT JUST DIDN'T LAST AND YOU DIDN'T HAVE MONEY TO GET MORE.: NEVER TRUE

## 2024-09-10 RX ORDER — HYDROCHLOROTHIAZIDE 12.5 MG/1
12.5 CAPSULE ORAL DAILY
Qty: 90 CAPSULE | Refills: 3 | Status: SHIPPED | OUTPATIENT
Start: 2024-09-10

## 2024-09-11 SDOH — HEALTH STABILITY: PHYSICAL HEALTH: ON AVERAGE, HOW MANY MINUTES DO YOU ENGAGE IN EXERCISE AT THIS LEVEL?: 0 MIN

## 2024-09-11 SDOH — HEALTH STABILITY: PHYSICAL HEALTH: ON AVERAGE, HOW MANY DAYS PER WEEK DO YOU ENGAGE IN MODERATE TO STRENUOUS EXERCISE (LIKE A BRISK WALK)?: 0 DAYS

## 2024-09-11 ASSESSMENT — LIFESTYLE VARIABLES
HOW MANY STANDARD DRINKS CONTAINING ALCOHOL DO YOU HAVE ON A TYPICAL DAY: 0
HOW OFTEN DO YOU HAVE A DRINK CONTAINING ALCOHOL: 1
HOW MANY STANDARD DRINKS CONTAINING ALCOHOL DO YOU HAVE ON A TYPICAL DAY: PATIENT DOES NOT DRINK
HOW OFTEN DO YOU HAVE SIX OR MORE DRINKS ON ONE OCCASION: 1
HOW OFTEN DO YOU HAVE A DRINK CONTAINING ALCOHOL: NEVER

## 2024-09-11 ASSESSMENT — PATIENT HEALTH QUESTIONNAIRE - PHQ9
SUM OF ALL RESPONSES TO PHQ QUESTIONS 1-9: 0
1. LITTLE INTEREST OR PLEASURE IN DOING THINGS: NOT AT ALL
SUM OF ALL RESPONSES TO PHQ QUESTIONS 1-9: 0
SUM OF ALL RESPONSES TO PHQ QUESTIONS 1-9: 0
2. FEELING DOWN, DEPRESSED OR HOPELESS: NOT AT ALL
SUM OF ALL RESPONSES TO PHQ9 QUESTIONS 1 & 2: 0
SUM OF ALL RESPONSES TO PHQ QUESTIONS 1-9: 0

## 2024-09-12 ENCOUNTER — OFFICE VISIT (OUTPATIENT)
Dept: INTERNAL MEDICINE CLINIC | Age: 84
End: 2024-09-12
Payer: MEDICARE

## 2024-09-12 VITALS
WEIGHT: 199 LBS | SYSTOLIC BLOOD PRESSURE: 154 MMHG | OXYGEN SATURATION: 97 % | DIASTOLIC BLOOD PRESSURE: 72 MMHG | HEART RATE: 79 BPM | TEMPERATURE: 99 F | BODY MASS INDEX: 34.16 KG/M2

## 2024-09-12 DIAGNOSIS — J06.9 URI WITH COUGH AND CONGESTION: Primary | ICD-10-CM

## 2024-09-12 DIAGNOSIS — R06.2 WHEEZING: ICD-10-CM

## 2024-09-12 PROCEDURE — 3078F DIAST BP <80 MM HG: CPT | Performed by: INTERNAL MEDICINE

## 2024-09-12 PROCEDURE — G8399 PT W/DXA RESULTS DOCUMENT: HCPCS | Performed by: INTERNAL MEDICINE

## 2024-09-12 PROCEDURE — G8417 CALC BMI ABV UP PARAM F/U: HCPCS | Performed by: INTERNAL MEDICINE

## 2024-09-12 PROCEDURE — G8427 DOCREV CUR MEDS BY ELIG CLIN: HCPCS | Performed by: INTERNAL MEDICINE

## 2024-09-12 PROCEDURE — 99213 OFFICE O/P EST LOW 20 MIN: CPT | Performed by: INTERNAL MEDICINE

## 2024-09-12 PROCEDURE — 1036F TOBACCO NON-USER: CPT | Performed by: INTERNAL MEDICINE

## 2024-09-12 PROCEDURE — G2211 COMPLEX E/M VISIT ADD ON: HCPCS | Performed by: INTERNAL MEDICINE

## 2024-09-12 PROCEDURE — 3077F SYST BP >= 140 MM HG: CPT | Performed by: INTERNAL MEDICINE

## 2024-09-12 PROCEDURE — 1090F PRES/ABSN URINE INCON ASSESS: CPT | Performed by: INTERNAL MEDICINE

## 2024-09-12 PROCEDURE — 1123F ACP DISCUSS/DSCN MKR DOCD: CPT | Performed by: INTERNAL MEDICINE

## 2024-09-12 RX ORDER — ALBUTEROL SULFATE 90 UG/1
2 AEROSOL, METERED RESPIRATORY (INHALATION) 4 TIMES DAILY PRN
Qty: 18 G | Refills: 0 | Status: SHIPPED | OUTPATIENT
Start: 2024-09-12

## 2024-09-12 RX ORDER — GUAIFENESIN AND DEXTROMETHORPHAN HYDROBROMIDE 600; 30 MG/1; MG/1
1 TABLET, EXTENDED RELEASE ORAL 2 TIMES DAILY
Refills: 0 | COMMUNITY
Start: 2024-09-12

## 2024-09-12 RX ORDER — BENZONATATE 100 MG/1
100 CAPSULE ORAL 3 TIMES DAILY PRN
Qty: 30 CAPSULE | Refills: 0 | Status: SHIPPED | OUTPATIENT
Start: 2024-09-12 | End: 2024-09-22

## 2024-09-13 LAB — SARS-COV-2 RNA RESP QL NAA+PROBE: DETECTED

## 2024-09-18 ENCOUNTER — OFFICE VISIT (OUTPATIENT)
Dept: INTERNAL MEDICINE CLINIC | Age: 84
End: 2024-09-18
Payer: MEDICARE

## 2024-09-18 VITALS
TEMPERATURE: 97.2 F | HEART RATE: 96 BPM | WEIGHT: 197 LBS | DIASTOLIC BLOOD PRESSURE: 88 MMHG | OXYGEN SATURATION: 98 % | BODY MASS INDEX: 33.81 KG/M2 | SYSTOLIC BLOOD PRESSURE: 138 MMHG

## 2024-09-18 DIAGNOSIS — I25.10 CORONARY ARTERY DISEASE INVOLVING NATIVE CORONARY ARTERY OF NATIVE HEART WITHOUT ANGINA PECTORIS: ICD-10-CM

## 2024-09-18 DIAGNOSIS — E78.2 HYPERLIPIDEMIA, MIXED: ICD-10-CM

## 2024-09-18 DIAGNOSIS — R73.01 IMPAIRED FASTING GLUCOSE: ICD-10-CM

## 2024-09-18 DIAGNOSIS — Z29.11 NEED FOR RSV VACCINATION: ICD-10-CM

## 2024-09-18 DIAGNOSIS — I10 BENIGN ESSENTIAL HTN: ICD-10-CM

## 2024-09-18 DIAGNOSIS — N18.31 STAGE 3A CHRONIC KIDNEY DISEASE (HCC): ICD-10-CM

## 2024-09-18 DIAGNOSIS — Z00.00 MEDICARE ANNUAL WELLNESS VISIT, SUBSEQUENT: Primary | ICD-10-CM

## 2024-09-18 DIAGNOSIS — U07.1 COVID-19 VIRUS INFECTION: ICD-10-CM

## 2024-09-18 PROBLEM — R06.02 SOB (SHORTNESS OF BREATH): Status: RESOLVED | Noted: 2020-03-12 | Resolved: 2024-09-18

## 2024-09-18 PROBLEM — M25.562 ACUTE PAIN OF LEFT KNEE: Status: RESOLVED | Noted: 2020-10-26 | Resolved: 2024-09-18

## 2024-09-18 PROBLEM — L90.5 SCAR OF NOSE: Status: RESOLVED | Noted: 2019-11-25 | Resolved: 2024-09-18

## 2024-09-18 PROCEDURE — 1123F ACP DISCUSS/DSCN MKR DOCD: CPT | Performed by: INTERNAL MEDICINE

## 2024-09-18 PROCEDURE — 3079F DIAST BP 80-89 MM HG: CPT | Performed by: INTERNAL MEDICINE

## 2024-09-18 PROCEDURE — 3075F SYST BP GE 130 - 139MM HG: CPT | Performed by: INTERNAL MEDICINE

## 2024-09-18 PROCEDURE — G0439 PPPS, SUBSEQ VISIT: HCPCS | Performed by: INTERNAL MEDICINE

## 2024-09-23 ENCOUNTER — HOSPITAL ENCOUNTER (OUTPATIENT)
Age: 84
Discharge: HOME | End: 2024-09-23
Payer: MEDICARE

## 2024-09-23 DIAGNOSIS — R73.01 IMPAIRED FASTING GLUCOSE: ICD-10-CM

## 2024-09-23 DIAGNOSIS — N18.31 STAGE 3A CHRONIC KIDNEY DISEASE (HCC): ICD-10-CM

## 2024-09-23 DIAGNOSIS — Z00.00 MEDICARE ANNUAL WELLNESS VISIT, SUBSEQUENT: ICD-10-CM

## 2024-09-23 DIAGNOSIS — E78.2 HYPERLIPIDEMIA, MIXED: ICD-10-CM

## 2024-09-23 DIAGNOSIS — I10 BENIGN ESSENTIAL HTN: ICD-10-CM

## 2024-09-23 DIAGNOSIS — I25.10 CORONARY ARTERY DISEASE INVOLVING NATIVE CORONARY ARTERY OF NATIVE HEART WITHOUT ANGINA PECTORIS: ICD-10-CM

## 2024-09-23 LAB
ALBUMIN SERPL-MCNC: 3.9 G/DL (ref 3.4–5)
ALBUMIN/GLOB SERPL: 1.1 {RATIO} (ref 1.1–2.2)
ALP SERPL-CCNC: 71 U/L (ref 40–129)
ALT SERPL-CCNC: 17 U/L (ref 10–40)
ANION GAP SERPL CALCULATED.3IONS-SCNC: 13 MMOL/L (ref 3–16)
AST SERPL-CCNC: 40 U/L (ref 15–37)
BASOPHILS # BLD: 0.1 K/UL (ref 0–0.2)
BASOPHILS NFR BLD: 1.4 %
BILIRUB SERPL-MCNC: 0.4 MG/DL (ref 0–1)
BUN SERPL-MCNC: 23 MG/DL (ref 7–20)
CALCIUM SERPL-MCNC: 10.1 MG/DL (ref 8.3–10.6)
CHLORIDE SERPL-SCNC: 101 MMOL/L (ref 99–110)
CHOLEST SERPL-MCNC: 267 MG/DL (ref 0–199)
CO2 SERPL-SCNC: 27 MMOL/L (ref 21–32)
CREAT SERPL-MCNC: 1.1 MG/DL (ref 0.6–1.2)
DEPRECATED RDW RBC AUTO: 14 % (ref 12.4–15.4)
EOSINOPHIL # BLD: 0.2 K/UL (ref 0–0.6)
EOSINOPHIL NFR BLD: 4.1 %
EST. AVERAGE GLUCOSE BLD GHB EST-MCNC: 116.9 MG/DL
GFR SERPLBLD CREATININE-BSD FMLA CKD-EPI: 50 ML/MIN/{1.73_M2}
GLUCOSE SERPL-MCNC: 96 MG/DL (ref 70–99)
HBA1C MFR BLD: 5.7 %
HCT VFR BLD AUTO: 45.3 % (ref 36–48)
HDLC SERPL-MCNC: 36 MG/DL (ref 40–60)
HGB BLD-MCNC: 14.4 G/DL (ref 12–16)
LDLC SERPL CALC-MCNC: ABNORMAL MG/DL
LDLC SERPL-MCNC: 126 MG/DL
LYMPHOCYTES # BLD: 1.4 K/UL (ref 1–5.1)
LYMPHOCYTES NFR BLD: 23.3 %
MCH RBC QN AUTO: 31.9 PG (ref 26–34)
MCHC RBC AUTO-ENTMCNC: 31.8 G/DL (ref 31–36)
MCV RBC AUTO: 100.2 FL (ref 80–100)
MONOCYTES # BLD: 0.5 K/UL (ref 0–1.3)
MONOCYTES NFR BLD: 7.7 %
NEUTROPHILS # BLD: 3.7 K/UL (ref 1.7–7.7)
NEUTROPHILS NFR BLD: 63.5 %
PLATELET # BLD AUTO: 233 K/UL (ref 135–450)
PMV BLD AUTO: 9.5 FL (ref 5–10.5)
POTASSIUM SERPL-SCNC: 4.8 MMOL/L (ref 3.5–5.1)
PROT SERPL-MCNC: 7.3 G/DL (ref 6.4–8.2)
RBC # BLD AUTO: 4.52 M/UL (ref 4–5.2)
SODIUM SERPL-SCNC: 141 MMOL/L (ref 136–145)
TRIGL SERPL-MCNC: 413 MG/DL (ref 0–150)
VLDLC SERPL CALC-MCNC: ABNORMAL MG/DL
WBC # BLD AUTO: 5.9 K/UL (ref 4–11)

## 2024-09-23 PROCEDURE — 80061 LIPID PANEL: CPT

## 2024-09-23 PROCEDURE — 36415 COLL VENOUS BLD VENIPUNCTURE: CPT

## 2024-09-23 PROCEDURE — 80053 COMPREHEN METABOLIC PANEL: CPT

## 2024-09-23 PROCEDURE — 83721 ASSAY OF BLOOD LIPOPROTEIN: CPT

## 2024-09-23 PROCEDURE — 83036 HEMOGLOBIN GLYCOSYLATED A1C: CPT

## 2024-09-23 PROCEDURE — 85025 COMPLETE CBC W/AUTO DIFF WBC: CPT

## 2024-09-29 DIAGNOSIS — G89.29 CHRONIC BILATERAL LOW BACK PAIN WITHOUT SCIATICA: ICD-10-CM

## 2024-09-29 DIAGNOSIS — M25.552 BILATERAL HIP PAIN: ICD-10-CM

## 2024-09-29 DIAGNOSIS — M25.551 BILATERAL HIP PAIN: ICD-10-CM

## 2024-09-29 DIAGNOSIS — M47.816 LUMBAR FACET ARTHROPATHY: ICD-10-CM

## 2024-09-29 DIAGNOSIS — M48.061 SPINAL STENOSIS OF LUMBAR REGION WITHOUT NEUROGENIC CLAUDICATION: ICD-10-CM

## 2024-09-29 DIAGNOSIS — M54.50 CHRONIC BILATERAL LOW BACK PAIN WITHOUT SCIATICA: ICD-10-CM

## 2024-09-30 RX ORDER — TRAMADOL HYDROCHLORIDE 50 MG/1
50 TABLET ORAL EVERY 6 HOURS PRN
Qty: 360 TABLET | Refills: 0 | Status: SHIPPED | OUTPATIENT
Start: 2024-09-30 | End: 2024-12-29

## 2024-10-03 ENCOUNTER — TELEPHONE (OUTPATIENT)
Dept: INTERNAL MEDICINE CLINIC | Age: 84
End: 2024-10-03

## 2024-10-03 DIAGNOSIS — R79.89 ELEVATED LFTS: ICD-10-CM

## 2024-10-03 DIAGNOSIS — N18.31 STAGE 3A CHRONIC KIDNEY DISEASE (HCC): Primary | ICD-10-CM

## 2024-10-03 NOTE — TELEPHONE ENCOUNTER
Pt called in for Dr. James in regards to getting new lab orders put in the system to do repeated labs. According to pt she was advised to stop taking tylenol for 3 days, then get lab work done. Pt would like to get the lab work done on Monday and after having labs done she will call to schedule a f/u to discuss pain management options.    normal for race

## 2024-10-07 ENCOUNTER — HOSPITAL ENCOUNTER (OUTPATIENT)
Age: 84
Discharge: HOME | End: 2024-10-07
Payer: MEDICARE

## 2024-10-07 DIAGNOSIS — R79.89 ELEVATED LFTS: ICD-10-CM

## 2024-10-07 DIAGNOSIS — N18.31 STAGE 3A CHRONIC KIDNEY DISEASE (HCC): ICD-10-CM

## 2024-10-07 LAB
ALBUMIN SERPL-MCNC: 4.2 G/DL (ref 3.4–5)
ALBUMIN/GLOB SERPL: 1.3 {RATIO} (ref 1.1–2.2)
ALP SERPL-CCNC: 78 U/L (ref 40–129)
ALT SERPL-CCNC: 14 U/L (ref 10–40)
ANION GAP SERPL CALCULATED.3IONS-SCNC: 12 MMOL/L (ref 3–16)
AST SERPL-CCNC: 26 U/L (ref 15–37)
BILIRUB SERPL-MCNC: 0.5 MG/DL (ref 0–1)
BUN SERPL-MCNC: 24 MG/DL (ref 7–20)
CALCIUM SERPL-MCNC: 9.6 MG/DL (ref 8.3–10.6)
CHLORIDE SERPL-SCNC: 99 MMOL/L (ref 99–110)
CO2 SERPL-SCNC: 28 MMOL/L (ref 21–32)
CREAT SERPL-MCNC: 1.1 MG/DL (ref 0.6–1.2)
GFR SERPLBLD CREATININE-BSD FMLA CKD-EPI: 50 ML/MIN/{1.73_M2}
GLUCOSE SERPL-MCNC: 102 MG/DL (ref 70–99)
POTASSIUM SERPL-SCNC: 4.1 MMOL/L (ref 3.5–5.1)
PROT SERPL-MCNC: 7.4 G/DL (ref 6.4–8.2)
SODIUM SERPL-SCNC: 139 MMOL/L (ref 136–145)

## 2024-10-07 PROCEDURE — 36415 COLL VENOUS BLD VENIPUNCTURE: CPT

## 2024-10-07 PROCEDURE — 80053 COMPREHEN METABOLIC PANEL: CPT

## 2024-10-10 ENCOUNTER — OFFICE VISIT (OUTPATIENT)
Dept: INTERNAL MEDICINE CLINIC | Age: 84
End: 2024-10-10
Payer: MEDICARE

## 2024-10-10 VITALS
BODY MASS INDEX: 33.81 KG/M2 | WEIGHT: 197 LBS | DIASTOLIC BLOOD PRESSURE: 72 MMHG | OXYGEN SATURATION: 94 % | SYSTOLIC BLOOD PRESSURE: 138 MMHG

## 2024-10-10 DIAGNOSIS — M48.061 SPINAL STENOSIS OF LUMBAR REGION WITHOUT NEUROGENIC CLAUDICATION: ICD-10-CM

## 2024-10-10 DIAGNOSIS — G89.29 CHRONIC BILATERAL LOW BACK PAIN WITHOUT SCIATICA: Primary | ICD-10-CM

## 2024-10-10 DIAGNOSIS — M96.1 POST LAMINECTOMY SYNDROME: ICD-10-CM

## 2024-10-10 DIAGNOSIS — M15.0 PRIMARY OSTEOARTHRITIS INVOLVING MULTIPLE JOINTS: ICD-10-CM

## 2024-10-10 DIAGNOSIS — Z96.89 SPINAL CORD STIMULATOR STATUS: ICD-10-CM

## 2024-10-10 DIAGNOSIS — M79.18 MYOFASCIAL PAIN SYNDROME: ICD-10-CM

## 2024-10-10 DIAGNOSIS — M54.50 CHRONIC BILATERAL LOW BACK PAIN WITHOUT SCIATICA: Primary | ICD-10-CM

## 2024-10-10 DIAGNOSIS — Z23 FLU VACCINE NEED: ICD-10-CM

## 2024-10-10 PROCEDURE — 1036F TOBACCO NON-USER: CPT | Performed by: INTERNAL MEDICINE

## 2024-10-10 PROCEDURE — 90653 IIV ADJUVANT VACCINE IM: CPT | Performed by: INTERNAL MEDICINE

## 2024-10-10 PROCEDURE — G0008 ADMIN INFLUENZA VIRUS VAC: HCPCS | Performed by: INTERNAL MEDICINE

## 2024-10-10 PROCEDURE — G8417 CALC BMI ABV UP PARAM F/U: HCPCS | Performed by: INTERNAL MEDICINE

## 2024-10-10 PROCEDURE — 1123F ACP DISCUSS/DSCN MKR DOCD: CPT | Performed by: INTERNAL MEDICINE

## 2024-10-10 PROCEDURE — 3075F SYST BP GE 130 - 139MM HG: CPT | Performed by: INTERNAL MEDICINE

## 2024-10-10 PROCEDURE — 3078F DIAST BP <80 MM HG: CPT | Performed by: INTERNAL MEDICINE

## 2024-10-10 PROCEDURE — G8427 DOCREV CUR MEDS BY ELIG CLIN: HCPCS | Performed by: INTERNAL MEDICINE

## 2024-10-10 PROCEDURE — 1090F PRES/ABSN URINE INCON ASSESS: CPT | Performed by: INTERNAL MEDICINE

## 2024-10-10 PROCEDURE — G8399 PT W/DXA RESULTS DOCUMENT: HCPCS | Performed by: INTERNAL MEDICINE

## 2024-10-10 PROCEDURE — G8482 FLU IMMUNIZE ORDER/ADMIN: HCPCS | Performed by: INTERNAL MEDICINE

## 2024-10-10 PROCEDURE — 99214 OFFICE O/P EST MOD 30 MIN: CPT | Performed by: INTERNAL MEDICINE

## 2024-10-10 RX ORDER — SENNOSIDES 8.6 MG
650-1300 CAPSULE ORAL EVERY 8 HOURS PRN
COMMUNITY
Start: 2024-10-10

## 2024-10-10 RX ORDER — GABAPENTIN 300 MG/1
300 CAPSULE ORAL 2 TIMES DAILY
Qty: 180 CAPSULE | Refills: 0 | Status: SHIPPED | OUTPATIENT
Start: 2024-10-10 | End: 2025-01-08

## 2024-10-10 NOTE — PROGRESS NOTES
[facility]  Internal Medicine  Patient Encounter  [facility] D[facility]O., [facility]        Chief Complaint   Patient presents with    Pain     Fibromyalgia & Osteoarthritis. Discuss pain management       HPI: 84 y.o. female seen today to discuss her ongoing pain issues.  Patient has several pain generators including chronic bilateral low back pain, lumbar spinal stenosis, lumbar spondylolisthesis, lumbar facet arthropathy, fibromyalgia, osteoarthritis of multiple sites.  Most of her pain is around her waist.  Pain is worse walking, bearing weight, prolonged standing, bending, stooping, twisting.  She has diffuse muscle pain.       Pain management strategies have included placement of a spinal cord stimulator along with oral analgesic and neuromodulator medication.  She is currently on gabapentin 300 mg nightly, tramadol 50 mg every 6 hours as needed for pain, Tylenol, turmeric.    It was recently discovered that the patient was taking Tylenol 8 or 9 times a day, about every 2 hours.  I did discuss with her the proper way to take Tylenol.  The patient was not pleased that she had to make a change in this regimen.  Her most recent lab test did show an isolated elevated AST.    She stopped the Tylenol for several days and had repeat lab tests.   She is back on Tylenol 650 mg every 8 hours.  She feels the effect does not last very long. She states she does not feel the Tramadol is effective at all.  She has not tried the Gabapentin in the day.  She has tried Apercreme with Lidocaine.  She does report that in the past, Percocet has not helped her.    She has not seen pain management.  She saw Dr. Reyes, Rheum years ago.  She has been seeing Dr. Barrera, PMR.          Past Medical History:   Diagnosis Date    Acquired spondylolisthesis of lumbosacral region     Bilateral primary osteoarthritis of hip     CAD (coronary artery disease)     one stent in heart    Chronic bilateral low back pain without sciatica

## 2024-10-14 ENCOUNTER — HOSPITAL ENCOUNTER (OUTPATIENT)
Dept: GENERAL RADIOLOGY | Age: 84
Discharge: HOME | End: 2024-10-14
Payer: MEDICARE

## 2024-10-14 ENCOUNTER — HOSPITAL ENCOUNTER (OUTPATIENT)
Age: 84
Discharge: HOME | End: 2024-10-14
Payer: MEDICARE

## 2024-10-14 DIAGNOSIS — M54.50 CHRONIC BILATERAL LOW BACK PAIN WITHOUT SCIATICA: ICD-10-CM

## 2024-10-14 DIAGNOSIS — M48.061 SPINAL STENOSIS OF LUMBAR REGION WITHOUT NEUROGENIC CLAUDICATION: ICD-10-CM

## 2024-10-14 DIAGNOSIS — G89.29 CHRONIC BILATERAL LOW BACK PAIN WITHOUT SCIATICA: ICD-10-CM

## 2024-10-14 DIAGNOSIS — Z96.89 SPINAL CORD STIMULATOR STATUS: ICD-10-CM

## 2024-10-14 DIAGNOSIS — M96.1 POST LAMINECTOMY SYNDROME: ICD-10-CM

## 2024-10-14 PROCEDURE — 72114 X-RAY EXAM L-S SPINE BENDING: CPT

## 2024-10-20 DIAGNOSIS — M48.061 SPINAL STENOSIS OF LUMBAR REGION WITHOUT NEUROGENIC CLAUDICATION: ICD-10-CM

## 2024-10-20 DIAGNOSIS — M51.360 DEGENERATION OF INTERVERTEBRAL DISC OF LUMBAR REGION WITH DISCOGENIC BACK PAIN: ICD-10-CM

## 2024-10-20 DIAGNOSIS — M47.816 LUMBAR FACET ARTHROPATHY: ICD-10-CM

## 2024-10-20 DIAGNOSIS — M54.50 CHRONIC BILATERAL LOW BACK PAIN WITHOUT SCIATICA: Primary | ICD-10-CM

## 2024-10-20 DIAGNOSIS — Z96.89 SPINAL CORD STIMULATOR STATUS: ICD-10-CM

## 2024-10-20 DIAGNOSIS — G89.29 CHRONIC BILATERAL LOW BACK PAIN WITHOUT SCIATICA: Primary | ICD-10-CM

## 2024-10-20 DIAGNOSIS — M96.1 POST LAMINECTOMY SYNDROME: ICD-10-CM

## 2024-11-08 ENCOUNTER — PATIENT MESSAGE (OUTPATIENT)
Dept: INTERNAL MEDICINE CLINIC | Age: 84
End: 2024-11-08

## 2024-11-16 DIAGNOSIS — G89.29 CHRONIC BILATERAL LOW BACK PAIN WITHOUT SCIATICA: Primary | ICD-10-CM

## 2024-11-16 DIAGNOSIS — Z96.89 SPINAL CORD STIMULATOR STATUS: ICD-10-CM

## 2024-11-16 DIAGNOSIS — M48.061 SPINAL STENOSIS OF LUMBAR REGION WITHOUT NEUROGENIC CLAUDICATION: ICD-10-CM

## 2024-11-16 DIAGNOSIS — Z98.1 S/P LUMBAR FUSION: ICD-10-CM

## 2024-11-16 DIAGNOSIS — M54.50 CHRONIC BILATERAL LOW BACK PAIN WITHOUT SCIATICA: Primary | ICD-10-CM

## 2024-11-16 DIAGNOSIS — M96.1 POST LAMINECTOMY SYNDROME: ICD-10-CM

## 2024-11-16 NOTE — TELEPHONE ENCOUNTER
I will start with a CT scan.  I would recommend following up with the neurosurgeon.  They will know better how to proceed.  Please have CT completed.

## 2024-11-22 ENCOUNTER — TELEPHONE (OUTPATIENT)
Dept: INTERNAL MEDICINE CLINIC | Age: 84
End: 2024-11-22

## 2024-11-22 NOTE — TELEPHONE ENCOUNTER
Patient is scheduled for 2 appointments in December, 12.13.24 and 12.30.24 for ov. On AWV in 9/18/24 patient was set to come back in 6 month's, which appt is best to keep?

## 2024-11-26 ENCOUNTER — HOSPITAL ENCOUNTER (OUTPATIENT)
Dept: CT IMAGING | Age: 84
Discharge: HOME | End: 2024-11-26
Attending: INTERNAL MEDICINE
Payer: MEDICARE

## 2024-11-26 DIAGNOSIS — M48.061 SPINAL STENOSIS OF LUMBAR REGION WITHOUT NEUROGENIC CLAUDICATION: ICD-10-CM

## 2024-11-26 DIAGNOSIS — M54.50 CHRONIC BILATERAL LOW BACK PAIN WITHOUT SCIATICA: ICD-10-CM

## 2024-11-26 DIAGNOSIS — G89.29 CHRONIC BILATERAL LOW BACK PAIN WITHOUT SCIATICA: ICD-10-CM

## 2024-11-26 DIAGNOSIS — M96.1 POST LAMINECTOMY SYNDROME: ICD-10-CM

## 2024-11-26 DIAGNOSIS — Z96.89 SPINAL CORD STIMULATOR STATUS: ICD-10-CM

## 2024-11-26 DIAGNOSIS — Z98.1 S/P LUMBAR FUSION: ICD-10-CM

## 2024-11-26 PROCEDURE — 72131 CT LUMBAR SPINE W/O DYE: CPT

## 2024-12-03 ENCOUNTER — PATIENT MESSAGE (OUTPATIENT)
Dept: INTERNAL MEDICINE CLINIC | Age: 84
End: 2024-12-03

## 2024-12-05 DIAGNOSIS — M54.50 CHRONIC BILATERAL LOW BACK PAIN WITHOUT SCIATICA: Primary | ICD-10-CM

## 2024-12-05 DIAGNOSIS — M47.816 LUMBAR SPONDYLOSIS: ICD-10-CM

## 2024-12-05 DIAGNOSIS — M47.816 LUMBAR FACET ARTHROPATHY: ICD-10-CM

## 2024-12-05 DIAGNOSIS — Z96.89 SPINAL CORD STIMULATOR STATUS: ICD-10-CM

## 2024-12-05 DIAGNOSIS — G89.29 CHRONIC BILATERAL LOW BACK PAIN WITHOUT SCIATICA: Primary | ICD-10-CM

## 2024-12-05 DIAGNOSIS — M51.360 DEGENERATION OF INTERVERTEBRAL DISC OF LUMBAR REGION WITH DISCOGENIC BACK PAIN: ICD-10-CM

## 2024-12-05 DIAGNOSIS — M96.1 POST LAMINECTOMY SYNDROME: ICD-10-CM

## 2024-12-05 DIAGNOSIS — M48.061 SPINAL STENOSIS OF LUMBAR REGION WITHOUT NEUROGENIC CLAUDICATION: ICD-10-CM

## 2024-12-05 NOTE — TELEPHONE ENCOUNTER
Referrals placed for Dr. Silverio for pain management and Dr. Giles for physical medicine rehabilitation for consideration of back injections.  Please fax referral to [facility].  May want to help patient get an appointment with Dr. Silverio' office

## 2024-12-06 NOTE — TELEPHONE ENCOUNTER
I just called and spoke to patient and she gave verbal understanding and was very thankful for the [employer] message so she can read everything as well as the phone call to discuss.

## 2024-12-09 DIAGNOSIS — M54.50 CHRONIC BILATERAL LOW BACK PAIN WITHOUT SCIATICA: ICD-10-CM

## 2024-12-09 DIAGNOSIS — M25.551 BILATERAL HIP PAIN: ICD-10-CM

## 2024-12-09 DIAGNOSIS — M25.552 BILATERAL HIP PAIN: ICD-10-CM

## 2024-12-09 DIAGNOSIS — M47.816 LUMBAR FACET ARTHROPATHY: ICD-10-CM

## 2024-12-09 DIAGNOSIS — G89.29 CHRONIC BILATERAL LOW BACK PAIN WITHOUT SCIATICA: ICD-10-CM

## 2024-12-09 DIAGNOSIS — M48.061 SPINAL STENOSIS OF LUMBAR REGION WITHOUT NEUROGENIC CLAUDICATION: ICD-10-CM

## 2024-12-09 NOTE — TELEPHONE ENCOUNTER
Last appointment: 10/10/2024  Next appointment: 12/30/2024  Last refill: 9/30/24  Requested Prescriptions     Pending Prescriptions Disp Refills    traMADol (ULTRAM) 50 MG tablet [Pharmacy Med Name: TRAMADOL HCL TABS 50MG] 360 tablet 0     Sig: TAKE 1 TABLET EVERY 6 HOURS AS NEEDED FOR PAIN. MAXIMUM DAILY AMOUNT 200 MG. REDUCE DOSES TAKEN AS PAIN BECOMES MANAGEABLE

## 2024-12-10 RX ORDER — TRAMADOL HYDROCHLORIDE 50 MG/1
50 TABLET ORAL EVERY 6 HOURS PRN
Qty: 360 TABLET | Refills: 0 | Status: SHIPPED | OUTPATIENT
Start: 2024-12-10 | End: 2025-03-10

## 2024-12-10 NOTE — TELEPHONE ENCOUNTER
Patient requesting refill for tramadol.  Patient uses for chronic low back pain, postlaminectomy syndrome.  Multimodal approach with tramadol, gabapentin.  Patient has referral to pain management and PMR.  OARRS report checked.  No discrepancies or inconsistencies.  Medication is effective.  Refilled

## 2024-12-26 ENCOUNTER — PATIENT MESSAGE (OUTPATIENT)
Dept: INTERNAL MEDICINE CLINIC | Age: 84
End: 2024-12-26

## 2024-12-27 ENCOUNTER — OFFICE VISIT (OUTPATIENT)
Dept: INTERNAL MEDICINE CLINIC | Age: 84
End: 2024-12-27

## 2024-12-27 VITALS
TEMPERATURE: 97.2 F | DIASTOLIC BLOOD PRESSURE: 90 MMHG | SYSTOLIC BLOOD PRESSURE: 158 MMHG | BODY MASS INDEX: 33.99 KG/M2 | OXYGEN SATURATION: 94 % | WEIGHT: 198 LBS | HEART RATE: 80 BPM

## 2024-12-27 DIAGNOSIS — B37.2 CANDIDAL INTERTRIGO: Primary | ICD-10-CM

## 2024-12-27 RX ORDER — KETOCONAZOLE 20 MG/G
CREAM TOPICAL
Qty: 60 G | Refills: 1 | Status: SHIPPED | OUTPATIENT
Start: 2024-12-27

## 2024-12-27 RX ORDER — HYDROCORTISONE 25 MG/G
CREAM TOPICAL
Qty: 30 G | Refills: 1 | Status: SHIPPED | OUTPATIENT
Start: 2024-12-27

## 2024-12-27 NOTE — PROGRESS NOTES
(!) 142/88 (!) 158/90   Pulse: 80    Temp: 97.2 °F (36.2 °C)    TempSrc: Temporal    SpO2: 94%    Weight: 89.8 kg (198 lb)      Body mass index is 33.99 kg/m².     Wt Readings from Last 3 Encounters:   12/27/24 89.8 kg (198 lb)   10/10/24 89.4 kg (197 lb)   09/18/24 89.4 kg (197 lb)     BP Readings from Last 3 Encounters:   12/27/24 (!) 158/90   10/10/24 138/72   09/18/24 138/88        GEN: NAD, A&O, Non-toxic  SKIN: Bilateral inframammary breast folds, abdominal fold, groin folds with well-demarcated erythematous macular rash.  No cracking or fissuring.  No vesicles or bullae.                        ASSESSMENT/PLAN:    1. Candidal intertrigo  Patient with fairly extensive intertriginous dermatitis likely yeast  Will use topical antifungal as well as a short course of low potency topical corticosteroid for the itching.  Advised patient to call should symptoms persist, worsen or if new symptoms develop  - ketoconazole (NIZORAL) 2 % cream; Apply topically daily for 14 days.  Dispense: 60 g; Refill: 1  - hydrocortisone 2.5 % cream; Apply topically 2 times daily for 7 days only.  Dispense: 30 g; Refill: 1              Discussed medications with patient who voiced understanding of their use, indication and potential side effects.  Pt also understands the above recommendations.   All questions answered.    This note was generated completely or in part utilizing Dragon dictation speech recognition software.  Occasionally, words are mistranscribed and despite editing, the text may contain inaccuracies due to incorrect word recognition.  If further clarification is needed please contact the office at [phone]       Electronically signed    Hill James D.O.

## 2025-01-08 DIAGNOSIS — B37.2 CANDIDAL INTERTRIGO: Primary | ICD-10-CM

## 2025-01-08 RX ORDER — TRIAMCINOLONE ACETONIDE 1 MG/G
CREAM TOPICAL
Qty: 45 G | Refills: 0 | Status: SHIPPED | OUTPATIENT
Start: 2025-01-08

## 2025-01-08 RX ORDER — FLUCONAZOLE 100 MG/1
TABLET ORAL
Qty: 8 TABLET | Refills: 0 | Status: SHIPPED | OUTPATIENT
Start: 2025-01-08

## 2025-01-23 ENCOUNTER — PATIENT MESSAGE (OUTPATIENT)
Dept: INTERNAL MEDICINE CLINIC | Age: 85
End: 2025-01-23

## 2025-01-23 DIAGNOSIS — B37.2 CANDIDAL INTERTRIGO: Primary | ICD-10-CM

## 2025-01-23 NOTE — TELEPHONE ENCOUNTER
If she is doing much better, I would stop the hydrocortisone specifically.  I would also transition to nystatin powder 3 times a day from the ketoconazole

## 2025-01-24 RX ORDER — NYSTATIN 100000 [USP'U]/G
POWDER TOPICAL
Qty: 60 G | Refills: 2 | Status: SHIPPED | OUTPATIENT
Start: 2025-01-24

## 2025-01-28 DIAGNOSIS — G89.29 CHRONIC BILATERAL LOW BACK PAIN WITHOUT SCIATICA: ICD-10-CM

## 2025-01-28 DIAGNOSIS — M48.061 SPINAL STENOSIS OF LUMBAR REGION WITHOUT NEUROGENIC CLAUDICATION: ICD-10-CM

## 2025-01-28 DIAGNOSIS — M54.50 CHRONIC BILATERAL LOW BACK PAIN WITHOUT SCIATICA: ICD-10-CM

## 2025-01-28 DIAGNOSIS — M79.18 MYOFASCIAL PAIN SYNDROME: ICD-10-CM

## 2025-01-28 DIAGNOSIS — M15.0 PRIMARY OSTEOARTHRITIS INVOLVING MULTIPLE JOINTS: ICD-10-CM

## 2025-01-28 DIAGNOSIS — Z96.89 SPINAL CORD STIMULATOR STATUS: ICD-10-CM

## 2025-01-28 DIAGNOSIS — M96.1 POST LAMINECTOMY SYNDROME: ICD-10-CM

## 2025-01-28 RX ORDER — GABAPENTIN 300 MG/1
300 CAPSULE ORAL 2 TIMES DAILY
Qty: 180 CAPSULE | Refills: 3 | Status: SHIPPED | OUTPATIENT
Start: 2025-01-28 | End: 2026-01-23

## 2025-01-28 NOTE — TELEPHONE ENCOUNTER
Last appointment: 12/27/2024  Next appointment: Visit date not found    Return in about 6 months (around 3/18/2025) for check up for chronic medical problems.  Sent [employer] message to schedule next appointment due in March.    Last refill: 10/10/24

## 2025-02-20 NOTE — PROGRESS NOTES
PATIENT REACHED   YES____NO__X__    PREOP INSTRUCTIONS LEFT ON VM [id number]___[phone]____________      DATE_2/27/25________ TIME__1300_______ARRIVAL_1200_______PLACE_[address]___________  NOTHING TO EAT OR DRINK  AFTER MIDNIGHT THE EVENING PRIOR OR AS INSTRUCTED BY YOUR DR.  YOU NEED A RESPONSIBLE ADULT AGE 18 OR OLDER TO DRIVE YOU HOME  PLEASE BRING INSURANCE CARD.PICTURE ID AND COMPLETE LIST OF MEDS  WEAR LOOSE COMFORTABLE CLOTHING  FOLLOW ANY INSTRUCTIONS YOUR DRS OFFICE HAS GIVEN YOU,INCLUDING WHAT MEDICATIONS TO TAKE THE AM OF PROCEDURE AND WHEN AND IF YOU NEED TO STOP ANY BLOOD THINNERS. IF YOU HAVE QUESTIONS REGARDING THIS CALL THE OFFICE  THE GOAL BLOOD SUGAR THE AM OF PROCEDURE  OR LESS ABOVE THAT THE PROCEDURE MAY BE CANCELLED  ANY QUESTIONS CALL YOUR DOCTOR.ALSO,PLEASE READ THE INSTRUCTION PACKET FROM YOUR DR IF YOU RECEIVED ONE.  SPINE INTERVENTION NUMBER [phone]      OTHER___________________________________      VISITOR POLICY(subject to change)    Current policy is 2 visitors per patient. No children. Masks are required.

## 2025-02-27 ENCOUNTER — HOSPITAL ENCOUNTER (OUTPATIENT)
Age: 85
Setting detail: OUTPATIENT SURGERY
Discharge: HOME | End: 2025-02-27
Attending: PHYSICAL MEDICINE & REHABILITATION | Admitting: PHYSICAL MEDICINE & REHABILITATION
Payer: MEDICARE

## 2025-02-27 ENCOUNTER — APPOINTMENT (OUTPATIENT)
Dept: GENERAL RADIOLOGY | Age: 85
End: 2025-02-27
Attending: PHYSICAL MEDICINE & REHABILITATION
Payer: MEDICARE

## 2025-02-27 VITALS
HEART RATE: 76 BPM | SYSTOLIC BLOOD PRESSURE: 172 MMHG | OXYGEN SATURATION: 95 % | TEMPERATURE: 97.5 F | BODY MASS INDEX: 33.8 KG/M2 | RESPIRATION RATE: 16 BRPM | WEIGHT: 198 LBS | DIASTOLIC BLOOD PRESSURE: 94 MMHG | HEIGHT: 64 IN

## 2025-02-27 PROCEDURE — 2709999900 HC NON-CHARGEABLE SUPPLY: Performed by: PHYSICAL MEDICINE & REHABILITATION

## 2025-02-27 PROCEDURE — 3610000056 HC PAIN LEVEL 4 BASE (NON-OR): Performed by: PHYSICAL MEDICINE & REHABILITATION

## 2025-02-27 PROCEDURE — 6360000004 HC RX CONTRAST MEDICATION: Performed by: PHYSICAL MEDICINE & REHABILITATION

## 2025-02-27 PROCEDURE — 6360000002 HC RX W HCPCS: Performed by: PHYSICAL MEDICINE & REHABILITATION

## 2025-02-27 PROCEDURE — 77003 FLUOROGUIDE FOR SPINE INJECT: CPT

## 2025-02-27 RX ORDER — IOPAMIDOL 408 MG/ML
INJECTION, SOLUTION INTRATHECAL
Status: COMPLETED | OUTPATIENT
Start: 2025-02-27 | End: 2025-02-27

## 2025-02-27 RX ORDER — METHYLPREDNISOLONE ACETATE 40 MG/ML
INJECTION, SUSPENSION INTRA-ARTICULAR; INTRALESIONAL; INTRAMUSCULAR; SOFT TISSUE
Status: COMPLETED | OUTPATIENT
Start: 2025-02-27 | End: 2025-02-27

## 2025-02-27 RX ORDER — LIDOCAINE HYDROCHLORIDE 10 MG/ML
INJECTION, SOLUTION EPIDURAL; INFILTRATION; INTRACAUDAL; PERINEURAL
Status: COMPLETED | OUTPATIENT
Start: 2025-02-27 | End: 2025-02-27

## 2025-02-27 RX ORDER — BUPIVACAINE HYDROCHLORIDE 5 MG/ML
INJECTION, SOLUTION EPIDURAL; INTRACAUDAL
Status: COMPLETED | OUTPATIENT
Start: 2025-02-27 | End: 2025-02-27

## 2025-02-27 ASSESSMENT — PAIN - FUNCTIONAL ASSESSMENT: PAIN_FUNCTIONAL_ASSESSMENT: 0-10

## 2025-02-27 ASSESSMENT — PAIN SCALES - GENERAL: PAINLEVEL_OUTOF10: 1

## 2025-02-27 NOTE — H&P
HISTORY AND PHYSICAL/PRE-SEDATION ASSESSMENT    Patient:  Heidi Rogel   :  1940  Medical Record No.:  [id number]   Date:  2025  Physician:  George Giles MD  Facility: [facility]    HISTORY OF PRESENT ILLNESS:                 The patient is a 84 y.o. female whom presents with si joint pain. Review of the imaging and physical exam of the patient confirmed the pre-procedure diagnosis.  After a thorough discussion of risks, benefits and alternatives informed consent was obtained.    Diagnosis:  Pre-Op Diagnosis Codes:      * Sacroiliac joint dysfunction [M53.3]    Past Medical History:   Past Medical History:   Diagnosis Date    Acquired spondylolisthesis of lumbosacral region     Bilateral primary osteoarthritis of hip     CAD (coronary artery disease)     one stent in heart    Chronic bilateral low back pain without sciatica 2018    CKD (chronic kidney disease), stage III (HCC)     COVID-19 virus infection 2024    DDD (degenerative disc disease), lumbar     Dr. Noel    Fibromyalgia     Dr. James    GERD (gastroesophageal reflux disease)     Headache(784.0)     Heel spur 2016    History of lumbar spinal fusion     Hyperlipidemia     Hypertension     IBS (irritable bowel syndrome)     Lyme disease     suspected    Medical history reviewed with no changes     MVP (mitral valve prolapse)     Neuroma nos     Osteoarthritis     Osteopenia     Post-laminectomy syndrome     Rotator cuff tear     Dr. Cortes    Squamous cell cancer of skin of right hand 2018    squamous cell carcinoma    Stress incontinence, female     Urinary incontinence         Past Surgical History:     Past Surgical History:   Procedure Laterality Date    BREAST SURGERY      cyst removal    COLONOSCOPY  2006    CORONARY ANGIOPLASTY WITH STENT PLACEMENT  2002    stent, LAD    HC INJECTION PROCEDURE FOR SACROILIAC JOINT Bilateral 2020    BILATERAL SACROILIAC JOINT

## 2025-02-27 NOTE — PROGRESS NOTES
VSS, tolerating po, ambulating with walker w/o difficulty. Rates pain 1/10.      Pt discharged home; verbalizes understanding of d/c instructions including medication orders for home and possible side effects associated with them.  Pt instructed to call the doctor listed if they should have any complications or worsening of symptoms.

## 2025-02-27 NOTE — OP NOTE
PATIENT:  Heidi Rogel  AGE:  84 yrs  MEDICAL RECORD #:  [id number]  YOB: 1940     DATE:  2/27/2025  PHYSICIAN: George Giles M.D.     PROCEDURE: Bilateral diagnostic sacroiliac joint (SIJ) injection (Lidocaine)     PRE-OP DIAGNOSIS:  SI joint pain     POST-OP DIAGNOSIS:  same     HISTORY OF PRESENT ILLNESS:  See office notes. Patient has failed previous less-invasive treatments.     ALLERGIES:  Aquaphor [aquaphilic], Other, Statins, and Adhesive tape     MEDICATIONS:    No current facility-administered medications for this encounter.        PHYSICAL EXAMINATION:              General:  Awake, alert              Heart:  No audible murmurs, extremities well perfused              Lungs:  No increased WOB or audible wheezing              Extremities:  Normal tone. Warm. No swelling.         Anesthesia: 0 mg Versed and 0 mcg fentanyl    Estimated blood loss: None  Complications: None  Specimen: None    Pre-procedure pain: 7/10  Post-procedure pain: 0.5/10    DESCRIPTION OF PROCEDURE:     Components of the procedure were again reviewed with the patient prior to the procedure.  She is aware of risks including infection, bleeding, allergic reaction, and nerve injury.  She had ample opportunity for additional questions.  She elected to proceed with treatment.     The patient was placed in the prone position.  Cardiovascular monitoring was initiated, and vital signs were stable prior to, during, and after the procedure.  Utilizing fluoroscopy, the  Bilateral SI joint(s) was/were identified.  The area was sterilely prepped and draped. Skin anesthesia was achieved at each entry site using 1-2 cc of Lidocaine 1%. A 22 g 3.5 inch spinal needle was slowly inserted into the Bilateral SI joint(s) using AP and oblique fluoroscopic imaging and tap and advance positioning.  0.5 cc of Isovue 300 was injected into the joint to confirm placement.  A total of 1 cc Lidocaine 1% was slowly injected at each site. The

## 2025-02-27 NOTE — PROGRESS NOTES
From procedure room following bilateral SI joint injection, moving all extremities equally. Bandaid intact

## 2025-03-05 NOTE — PROGRESS NOTES
PATIENT REACHED   YES____NO__X__    PREOP INSTRUCTIONS LEFT ON  RUEDHF_[phone]______________      DATE__3/13/25_______ TIME__1245_______ARRIVAL_1145_______PLACE_[address]___________  NOTHING TO EAT OR DRINK  AFTER MIDNIGHT THE EVENING PRIOR OR AS INSTRUCTED BY YOUR DR.  YOU NEED A RESPONSIBLE ADULT AGE 18 OR OLDER TO DRIVE YOU HOME  PLEASE BRING INSURANCE CARD.PICTURE ID AND COMPLETE LIST OF MEDS  WEAR LOOSE COMFORTABLE CLOTHING  FOLLOW ANY INSTRUCTIONS YOUR DRS OFFICE HAS GIVEN YOU,INCLUDING WHAT MEDICATIONS TO TAKE THE AM OF PROCEDURE AND WHEN AND IF YOU NEED TO STOP ANY BLOOD THINNERS. IF YOU HAVE QUESTIONS REGARDING THIS CALL THE OFFICE  THE GOAL BLOOD SUGAR THE AM OF PROCEDURE  OR LESS ABOVE THAT THE PROCEDURE MAY BE CANCELLED  ANY QUESTIONS CALL YOUR DOCTOR.ALSO,PLEASE READ THE INSTRUCTION PACKET FROM YOUR DR IF YOU RECEIVED ONE.  SPINE INTERVENTION NUMBER [phone]      OTHER___________________________________      VISITOR POLICY(subject to change)    Current policy is 2 visitors per patient. No children. Masks are required.

## 2025-03-06 ENCOUNTER — TELEPHONE (OUTPATIENT)
Dept: INTERNAL MEDICINE CLINIC | Age: 85
End: 2025-03-06

## 2025-03-06 NOTE — TELEPHONE ENCOUNTER
Called the pt to over her available times for an ov with Dr. James, the pt wants to be seen either thursday 3/20 or Friday 3/21 for her f/u visit with him. Did offer the open spots but wants to be seen sooner. Does have an appt with the neurologist on 3/13 and then f/u with Dr. James. Please advise if we squeeze the at the EOD on 3/21, EOD is fine with her.     Pt callback# [phone]

## 2025-03-06 NOTE — TELEPHONE ENCOUNTER
ALEKSANDER    Called the pt back and told her per Dr. James:    Only thing I can do is end of day on that Friday.    What is the urgency?     The patient stated that there wasn't any urgency and that she just wanted Dr. James to be informed with what's going on with her during her many appts with different providers. She also will be sending a message through [employer] to Dr. James, a status report either today or tomorrow and let him decide what she should do in regards to needing an appt or not.     Pt callback# [phone]

## 2025-03-12 ENCOUNTER — OFFICE VISIT (OUTPATIENT)
Dept: PAIN MANAGEMENT | Age: 85
End: 2025-03-12

## 2025-03-12 VITALS
OXYGEN SATURATION: 94 % | BODY MASS INDEX: 33.63 KG/M2 | TEMPERATURE: 96.8 F | SYSTOLIC BLOOD PRESSURE: 172 MMHG | WEIGHT: 197 LBS | DIASTOLIC BLOOD PRESSURE: 87 MMHG | HEART RATE: 75 BPM | HEIGHT: 64 IN

## 2025-03-12 DIAGNOSIS — M47.816 FACET HYPERTROPHY OF LUMBAR REGION: ICD-10-CM

## 2025-03-12 DIAGNOSIS — G89.29 CHRONIC MIDLINE LOW BACK PAIN WITH SCIATICA, SCIATICA LATERALITY UNSPECIFIED: ICD-10-CM

## 2025-03-12 DIAGNOSIS — E66.1 CLASS 1 DRUG-INDUCED OBESITY WITH BODY MASS INDEX (BMI) OF 33.0 TO 33.9 IN ADULT, UNSPECIFIED WHETHER SERIOUS COMORBIDITY PRESENT: ICD-10-CM

## 2025-03-12 DIAGNOSIS — M25.552 BILATERAL HIP PAIN: ICD-10-CM

## 2025-03-12 DIAGNOSIS — G89.4 CHRONIC PAIN SYNDROME: ICD-10-CM

## 2025-03-12 DIAGNOSIS — E66.811 CLASS 1 DRUG-INDUCED OBESITY WITH BODY MASS INDEX (BMI) OF 33.0 TO 33.9 IN ADULT, UNSPECIFIED WHETHER SERIOUS COMORBIDITY PRESENT: ICD-10-CM

## 2025-03-12 DIAGNOSIS — M54.40 CHRONIC MIDLINE LOW BACK PAIN WITH SCIATICA, SCIATICA LATERALITY UNSPECIFIED: ICD-10-CM

## 2025-03-12 DIAGNOSIS — Z51.81 ENCOUNTER FOR THERAPEUTIC DRUG MONITORING: ICD-10-CM

## 2025-03-12 DIAGNOSIS — M51.361 DEGENERATION OF INTERVERTEBRAL DISC OF LUMBAR REGION WITH LOWER EXTREMITY PAIN: ICD-10-CM

## 2025-03-12 DIAGNOSIS — M43.17 SPONDYLOLISTHESIS OF LUMBOSACRAL REGION: ICD-10-CM

## 2025-03-12 DIAGNOSIS — M47.814 THORACIC SPONDYLOSIS: ICD-10-CM

## 2025-03-12 DIAGNOSIS — M25.551 BILATERAL HIP PAIN: ICD-10-CM

## 2025-03-12 DIAGNOSIS — M96.1 FAILED BACK SYNDROME, LUMBAR: ICD-10-CM

## 2025-03-12 DIAGNOSIS — M19.019 ARTHRITIS, SHOULDER REGION: ICD-10-CM

## 2025-03-12 RX ORDER — LIDOCAINE 36 MG/1
PATCH TOPICAL
Qty: 4 PATCH | Refills: 0 | COMMUNITY
Start: 2025-03-12

## 2025-03-12 RX ORDER — LIDOCAINE 36 MG/1
PATCH TOPICAL
Qty: 30 PATCH | Refills: 0 | Status: SHIPPED | OUTPATIENT
Start: 2025-03-12

## 2025-03-12 RX ORDER — DULOXETIN HYDROCHLORIDE 20 MG/1
20 CAPSULE, DELAYED RELEASE ORAL NIGHTLY
Qty: 30 CAPSULE | Refills: 0 | Status: SHIPPED | OUTPATIENT
Start: 2025-03-12 | End: 2025-04-11

## 2025-03-12 RX ORDER — HYDROCODONE BITARTRATE AND ACETAMINOPHEN 7.5; 325 MG/1; MG/1
1 TABLET ORAL EVERY 8 HOURS PRN
Qty: 84 TABLET | Refills: 0 | Status: SHIPPED | OUTPATIENT
Start: 2025-03-12 | End: 2025-04-09

## 2025-03-12 NOTE — PROGRESS NOTES
----- Message from Abdirashid Farias MD sent at 3/23/2018  1:16 PM CDT -----  Regarding: Echo results  Please call Andrew Scruggs and let her know I discussed her recent visit with Dr. Luis Nieto this morning. Please convey results of recent echocardiogram. The report findings note the study was limited by technical factors. There was normal blood flow and systolic function. There were some mild findings of diastolic dysfunction (abnormal relaxation filling pattern) and possible mild tricuspid valve regurgitation. The report states the evaluation of these latter findings were incomplete, presumably due to limited visualization of the tricuspid valve. I do not think these findings are cause for concern at this time. We could repeat the echo in 3-6 months to try for a better evaluation. Alternatively she could see a cardiologist to discuss the findings and whether a repeat study is necessary. If she would like a copy of the Echo report please send to patient.   10. 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 Encounter for therapeutic drug monitoring       PLAN:  -Chronic opiate treatment protocol was discussed withthe patient, informed consent was obtained.  -Treatment guidelines were discussed and established  -Risks and benefits of narcotics were addressedwith the patient  - Obtainable long term and short term goals of opioid therapy were reviewed, including pain relief, sleep, psychosocial and physical functioning   -Obtain urine Toxicology today  -Norco 7.5-325 mg tabs orally q8h prn, Cymbalta 20 mg tabs orally daily, ZTlido 1.8% topically daily, Narcan 4mg/0.1mL Liqd nasal spray prn  -Maintains Neurontin 300 mg tabs through her PCP  -PT  -Reviewed previous imaging studies/blood work  -Education was provided on Norco, side effects reviewed. Patient verbalized understanding  -CBT techniques- relaxation therapies such as biofeedback, mindfulness based stress reduction, imagery, cognitive restructuring, problem solving discussed with patient   -She was advised weight reduction, diet changes- 800-1200 derick diet, diet diary, exercising, nutritional  consult increased physical activity as tolerated   -SOAPP score 0  -ORT score 0 low risk  -PHQ-9 score 7 mild depression  -Return in about 4 weeks (around 4/9/2025).     Controlled Substances Monitoring: Periodic Controlled Substance Monitoring: No signs of potential drug abuse or diversion identified. (Rebecca Monteiro, APRN - CNP)

## 2025-03-13 ENCOUNTER — HOSPITAL ENCOUNTER (OUTPATIENT)
Age: 85
Setting detail: OUTPATIENT SURGERY
Discharge: HOME | End: 2025-03-13
Attending: PHYSICAL MEDICINE & REHABILITATION | Admitting: PHYSICAL MEDICINE & REHABILITATION
Payer: MEDICARE

## 2025-03-13 ENCOUNTER — APPOINTMENT (OUTPATIENT)
Dept: GENERAL RADIOLOGY | Age: 85
End: 2025-03-13
Attending: PHYSICAL MEDICINE & REHABILITATION
Payer: MEDICARE

## 2025-03-13 VITALS
RESPIRATION RATE: 16 BRPM | TEMPERATURE: 97.2 F | WEIGHT: 197 LBS | HEART RATE: 64 BPM | SYSTOLIC BLOOD PRESSURE: 159 MMHG | BODY MASS INDEX: 33.63 KG/M2 | DIASTOLIC BLOOD PRESSURE: 80 MMHG | OXYGEN SATURATION: 99 % | HEIGHT: 64 IN

## 2025-03-13 PROCEDURE — 6360000002 HC RX W HCPCS: Performed by: PHYSICAL MEDICINE & REHABILITATION

## 2025-03-13 PROCEDURE — 3610000056 HC PAIN LEVEL 4 BASE (NON-OR): Performed by: PHYSICAL MEDICINE & REHABILITATION

## 2025-03-13 PROCEDURE — 77003 FLUOROGUIDE FOR SPINE INJECT: CPT

## 2025-03-13 PROCEDURE — 2709999900 HC NON-CHARGEABLE SUPPLY: Performed by: PHYSICAL MEDICINE & REHABILITATION

## 2025-03-13 RX ORDER — METHYLPREDNISOLONE ACETATE 40 MG/ML
INJECTION, SUSPENSION INTRA-ARTICULAR; INTRALESIONAL; INTRAMUSCULAR; SOFT TISSUE
Status: COMPLETED | OUTPATIENT
Start: 2025-03-13 | End: 2025-03-13

## 2025-03-13 RX ORDER — MIDAZOLAM HYDROCHLORIDE 1 MG/ML
INJECTION, SOLUTION INTRAMUSCULAR; INTRAVENOUS
Status: COMPLETED | OUTPATIENT
Start: 2025-03-13 | End: 2025-03-13

## 2025-03-13 RX ORDER — LIDOCAINE HYDROCHLORIDE 10 MG/ML
INJECTION, SOLUTION EPIDURAL; INFILTRATION; INTRACAUDAL; PERINEURAL
Status: COMPLETED | OUTPATIENT
Start: 2025-03-13 | End: 2025-03-13

## 2025-03-13 RX ORDER — BUPIVACAINE HYDROCHLORIDE 5 MG/ML
INJECTION, SOLUTION EPIDURAL; INTRACAUDAL; PERINEURAL
Status: COMPLETED | OUTPATIENT
Start: 2025-03-13 | End: 2025-03-13

## 2025-03-13 ASSESSMENT — PAIN - FUNCTIONAL ASSESSMENT
PAIN_FUNCTIONAL_ASSESSMENT: 0-10
PAIN_FUNCTIONAL_ASSESSMENT: PREVENTS OR INTERFERES SOME ACTIVE ACTIVITIES AND ADLS
PAIN_FUNCTIONAL_ASSESSMENT: 0-10

## 2025-03-13 ASSESSMENT — PAIN DESCRIPTION - DESCRIPTORS: DESCRIPTORS: ACHING

## 2025-03-13 NOTE — PROGRESS NOTES
___BILATERAL SACROILIAC JOINT INJECTION WITH FLUOROSCOPY WITH STEROID - LOPEZ - Bilateral _____________ site (marking) observed and reported at handoff

## 2025-03-13 NOTE — H&P
Allergies:  Aquaphor [aquaphilic], Other, Statins, and Adhesive tape    Social History:    reports that she has never smoked. She has never used smokeless tobacco. She reports that she does not drink alcohol and does not use drugs.    Family History:   Family History   Problem Relation Age of Onset    Diabetes Brother     Diabetes Mother     Heart Disease Mother         diabetic complication    Arthritis Mother     Heart Disease Father         not until age 82    Cancer Father         skin        Vitals: Blood pressure (!) 154/75, pulse 68, temperature 97.2 °F (36.2 °C), temperature source Temporal, resp. rate 16, height 1.626 m (5' 4\"), weight 89.4 kg (197 lb), SpO2 97%.    PHYSICAL EXAM:  HENT: Airway patent and reviewed  Cardiovascular: Normal rate, regular rhythm, normal heart sounds.   Pulmonary/Chest: No wheezes. No rhonchi. No rales.   Abdominal: Soft. Bowel sounds are normal. No distension.  Extremities: Moves all extremities equally  Lumbar Spine: Painful range of motion, no midline tenderness     ASA CLASS:         []   I. Normal, healthy adult           [x]   II.  Mild systemic disease            []   III.  Severe systemic disease    Mallampati: Mallampati Class II - (soft palate, fauces & uvula are visible)      Sedation plan:   []  Local              [x]  Minimal                  []  General anesthesia    Treatment plan:  Patient's condition acceptable for planned procedure/sedation.  Proceed with planned procedure   Post Procedure Plan   Return to same level of care   ______________________     The risks and benefits as well as alternatives to the procedure have been discussed with the patient and or family.  The patient and/or next of kin understands and agrees to proceed.    George Giles MD

## 2025-03-13 NOTE — OP NOTE
PATIENT:  Heidi Rogel  AGE:  84 yrs  MEDICAL RECORD #:  [id number]  YOB: 1940     DATE:  3/13/2025  PHYSICIAN: George Giles M.D.     PROCEDURE: Bilateral sacroiliac joint (SI) injection      PRE-OP DIAGNOSIS:  SI joint pain     POST-OP DIAGNOSIS:  same     HISTORY OF PRESENT ILLNESS:  See office notes. Patient has failed previous less-invasive treatments.     ALLERGIES:  Aquaphor [aquaphilic], Other, Statins, and Adhesive tape     MEDICATIONS:    No current facility-administered medications for this encounter.        PHYSICAL EXAMINATION:              General:  Awake, alert              Heart:  No audible murmurs, extremities well perfused              Lungs:  No increased WOB or audible wheezing              Extremities:  Normal tone. Warm. No swelling.      Anesthesia: 0.5 mg Versed and 0 mcg fentanyl    Estimated blood loss: None  Complications: None  Specimen: None    DESCRIPTION OF PROCEDURE:     Components of the procedure were again reviewed with the patient prior to the procedure.  She is aware of risks including infection, bleeding, allergic reaction, and nerve injury.  She had ample opportunity for additional questions.  She elected to proceed with treatment.     The patient was placed in the prone position.  Cardiovascular monitoring was initiated, and vital signs were stable prior to, during, and after the procedure.  Utilizing fluoroscopy, the  Bilateral SI joint(s) was/were identified.  The area was sterilely prepped and draped. Skin anesthesia was achieved at each entry site using 1-2 cc of Lidocaine 1%. A 22 g 3.5 inch spinal needle was slowly inserted into the Bilateral SI joint(s) using AP, lateral and oblique fluoroscopic imaging and tap and advance positioning.  0.5 cc of Isovue 300 was injected into the joint to confirm placement.  A combination of 1 cc Lidocaine 1% and 40 mg depomedrol were slowly injected at each site. The needle(s) was/were removed after the stylets  were repositioned. A sterile bandage was applied.  The patient was brought to recovery in stable condition.The patient tolerated the procedure well.     DISPOSITION:  The patient was transported to recovery.  The patient was monitored for 15 to 20 minutes post-procedure.  Precautions were discussed and written instructions provided.    Comment: None

## 2025-03-18 LAB
1,3 CHLOROPHENYL PIPERAZINE, URINE: NOT DETECTED
6-MONOACETYLMORPHINE: NEGATIVE NG/ML
7-AMINOCLONAZEPAM/CREATININE URINE: NOT DETECTED NG/MG CREAT
8-HYDROXYAMOXAPINE, URINE: NOT DETECTED
8-HYDROXYLOXAPINE, URINE: NOT DETECTED
ACETAMINOPHEN, URINE: NORMAL UG/ML
ACETAMINOPHEN, URINE: PRESENT
ALPHA HYDROXYALPRAZOLAM/CREATININE URINE: NOT DETECTED NG/MG CREAT
ALPHA HYDROXYTRIAZOLAM/CREAT UR CFM: NOT DETECTED NG/MG CREAT
ALPHA-HYDROXYMIDAZOLAM, URINE: NOT DETECTED NG/MG CREAT
ALPRAZOLAM/CREATININE URINE: NOT DETECTED NG/MG CREAT
AMINO CHLOROPYRIDINE, URINE: NOT DETECTED
AMITRIPTYLINE: NOT DETECTED
AMOXAPINE, URINE: NOT DETECTED
AMPHETAMINE SCREEN URINE: NEGATIVE NG/ML
ANALGESICS SCREEN URINE: NORMAL
ANTIDEPRESSANTS SCREEN URINE: NEGATIVE
ANTIPSYCHOTICS SCREEN URINE: NEGATIVE
ARIPIPRAZOLE, URINE: NOT DETECTED
ASENAPINE, URINE: NOT DETECTED
BACLOFEN, URINE: NOT DETECTED
BARBITURATE SCREEN URINE: NEGATIVE NG/ML
BENZODIAZEPINE SCREEN, URINE: NEGATIVE
BUPRENORPHINE URINE: NEGATIVE
BUPRENORPHINE/CREATININE URINE: NOT DETECTED NG/MG CREAT
BUPROPION, URINE: NOT DETECTED
CANNABINOID SCREEN URINE: NEGATIVE NG/ML
CARBAMAZEPINE, URINE: NOT DETECTED
CARISOPRODOL, UR: NOT DETECTED
CHLORPROMAZINE, URINE: NOT DETECTED
CITALOPRAM, URINE: NOT DETECTED
CLOBAZAM, URINE: NOT DETECTED
CLOMIPRAMINE, URINE: NOT DETECTED
CLONAZEPAM/CREATININE URINE: NOT DETECTED NG/MG CREAT
CLOZAPINE, URINE: NOT DETECTED
COCAINE METABOLITE SCREEN URINE: NEGATIVE NG/ML
CREATININE URINE: 78 MG/DL
CYCLOBENZAPRINE, URINE: NOT DETECTED
DESALKYLFLURAZEPAM/CREATININE URINE: NOT DETECTED NG/MG CREAT
DESIPRAMINE: NOT DETECTED
DESMETHYLCITALOPRAM, URINE: NOT DETECTED
DESMETHYLCLOMIPRAMINE, URINE: NOT DETECTED
DESMETHYLCLOZAPINE, URINE: NOT DETECTED
DESMETHYLCYCLOBENZAPRINE, URINE: NOT DETECTED
DESMETHYLDOXEPIN, URINE: NOT DETECTED
DESMETHYLFLUNITRAZEPAM, URINE: NOT DETECTED NG/MG CREAT
DESMETHYLSERTRALINE, URINE: NOT DETECTED
DESMETHYLVENLAFAXINE, URINE: NOT DETECTED
DIAZEPAM/CREATININE URINE: NOT DETECTED NG/MG CREAT
DOXEPIN, URINE: NOT DETECTED
DULOXETINE, URINE: NOT DETECTED
EZOGABINE, URINE: NOT DETECTED
FENTANYL / ANALOGUES SCREEN URINE: NEGATIVE
FENTANYL/CREATININE URINE: NOT DETECTED NG/MG CREAT
FLUNITRAZEPAM, URINE: NOT DETECTED NG/MG CREAT
FLUOXETINE, URINE: NOT DETECTED
FLUPHENAZINE, URINE: NOT DETECTED
FLUVOXAMINE, URINE: NOT DETECTED
GABAPENTIN: PRESENT
HALOPERIDOL, URINE: NOT DETECTED
HYDROXYBUPROPION, URINE: NOT DETECTED
ILOPERIDONE, URINE: NOT DETECTED
IMIPRAMINE, URINE: NOT DETECTED
KETAMINE, URINE: NOT DETECTED
LABORATORY REPORT: NORMAL
LAMOTRIGINE, URINE: NOT DETECTED
LEVETIRACETAM, URINE: NOT DETECTED
LORAZEPAM/CREATININE URINE: NOT DETECTED NG/MG CREAT
LOXAPINE, URINE: NOT DETECTED
LURASIDONE, URINE: NOT DETECTED
MAPROTILINE, URINE: NOT DETECTED
MEPERIDINE: NEGATIVE NG/ML
MEPROBAMATE, URINE: NOT DETECTED
MESORIDAZINE, URINE: NOT DETECTED
METAXALONE, URINE: NOT DETECTED
METHADONE AND METABOLITES, URINE: NEGATIVE NG/ML
METHADONE SCREEN, URINE: NEGATIVE NG/ML
METHOCARBAMOL, URINE: NOT DETECTED
METHYLPHENIDATE, URINE: NOT DETECTED
MIDAZOLAM/CREATININE URINE: NOT DETECTED NG/MG CREAT
MIRTAZAPINE, URINE: NOT DETECTED
MOLINDONE, URINE: NOT DETECTED
MUSCLE RELAXANTS SCREEN URINE: NEGATIVE
N-NORTRAMADOL/CREAT UR CFM: 4681 NG/MG CREAT
NEFAZODONE, URINE: NOT DETECTED
NORBUPRENORPHINE/CREATININE URINE: NOT DETECTED NG/MG CREAT
NORDIAZEPAM/CREATININE URINE: NOT DETECTED NG/MG CREAT
NORFENTANYL/CREATININE URINE: NOT DETECTED NG/MG CREAT
NORFLUOXETINE, URINE: NOT DETECTED
NORKETAMINE, URINE: NOT DETECTED
NORTRIPTYLINE: NOT DETECTED
O-NORTRAMADOL, URINE: >6410 NG/MG CREAT
OLANZAPINE, URINE: NOT DETECTED
OPIATE SCREEN URINE: NEGATIVE NG/ML
ORPHENADRINE, URINE: NOT DETECTED
OTHER HALLUCINOGENS SCREEN URINE: NEGATIVE
OXAZEPAM/CREATININE URINE: NOT DETECTED NG/MG CREAT
OXCARBAZEPINE MHD, URINE: NOT DETECTED
OXYCODONE SCREEN URINE: NEGATIVE NG/ML
PAROXETINE, URINE: NOT DETECTED
PERPHENAZINE, URINE: NOT DETECTED
PHENCYCLIDINE SCREEN URINE: NEGATIVE NG/ML
PHENYTOIN, URINE: NOT DETECTED
PIMOZIDE, URINE: NOT DETECTED
PREGABALIN, URINE: NOT DETECTED
PREGABALIN: NORMAL
PRESCRIBED MEDICATIONS: NORMAL
PRIMIDONE, URINE: NOT DETECTED
PROCHLORPERAZINE, URINE: NOT DETECTED
PROPOXYPHENE: NEGATIVE NG/ML
PROTRIPTYLINE, URINE: NOT DETECTED
QUETIAPINE, URINE: NOT DETECTED
RISPERIDONE, URINE: NOT DETECTED
RITALINIC ACID, UR: NOT DETECTED
RUFINAMIDE, URINE: NOT DETECTED
SEDATIVES/HYPNOTICS SCREEN URINE: NEGATIVE
SERTRALINE, URINE: NOT DETECTED
SYMPATHOMIMETICS SCREEN URINE: NEGATIVE
TAPENTADOL SCREEN URINE: NEGATIVE NG/ML
TEMAZEPAM/CREATININE URINE: NOT DETECTED NG/MG CREAT
THIORIDAZINE, URINE: NOT DETECTED
THIOTHIXENE, URINE: NOT DETECTED
TIAGABINE, URINE: NOT DETECTED
TIZANIDINE, URINE: NOT DETECTED
TOPIRAMATE, URINE: NOT DETECTED
TRAMADOL SCREEN URINE: NORMAL NG/ML
TRAMADOL, URINE: NORMAL
TRAMADOL: >6410 NG/MG CREAT
TRAZODONE, URINE: NOT DETECTED
TRIFLUOPERAZINE, URINE: NOT DETECTED
TRIMIPRAMINE, URINE: NOT DETECTED
VENLAFAXINE, URINE: NOT DETECTED
VILAZODONE, URINE: NOT DETECTED
ZALEPLON, URINE: NOT DETECTED
ZIPRASIDONE, URINE: NOT DETECTED
ZOLPIDEM ACID, URINE: NOT DETECTED
ZOLPIDEM, URINE: NOT DETECTED
ZONISAMIDE, URINE: NOT DETECTED
ZOPICLONE/ESZOPICLONE, URINE: NOT DETECTED

## 2025-03-28 ENCOUNTER — HOSPITAL ENCOUNTER (OUTPATIENT)
Age: 85
Discharge: HOME | End: 2025-03-28
Payer: MEDICARE

## 2025-03-28 ENCOUNTER — HOSPITAL ENCOUNTER (OUTPATIENT)
Dept: GENERAL RADIOLOGY | Age: 85
Discharge: HOME | End: 2025-03-28
Payer: MEDICARE

## 2025-03-28 DIAGNOSIS — G89.4 CHRONIC PAIN SYNDROME: ICD-10-CM

## 2025-03-28 DIAGNOSIS — M19.019 ARTHRITIS, SHOULDER REGION: ICD-10-CM

## 2025-03-28 DIAGNOSIS — M25.552 BILATERAL HIP PAIN: ICD-10-CM

## 2025-03-28 DIAGNOSIS — M25.551 BILATERAL HIP PAIN: ICD-10-CM

## 2025-03-28 PROCEDURE — 73521 X-RAY EXAM HIPS BI 2 VIEWS: CPT

## 2025-03-28 PROCEDURE — 73030 X-RAY EXAM OF SHOULDER: CPT

## 2025-04-01 SDOH — ECONOMIC STABILITY: FOOD INSECURITY: WITHIN THE PAST 12 MONTHS, THE FOOD YOU BOUGHT JUST DIDN'T LAST AND YOU DIDN'T HAVE MONEY TO GET MORE.: NEVER TRUE

## 2025-04-01 SDOH — ECONOMIC STABILITY: FOOD INSECURITY: WITHIN THE PAST 12 MONTHS, YOU WORRIED THAT YOUR FOOD WOULD RUN OUT BEFORE YOU GOT MONEY TO BUY MORE.: NEVER TRUE

## 2025-04-01 SDOH — ECONOMIC STABILITY: INCOME INSECURITY: IN THE LAST 12 MONTHS, WAS THERE A TIME WHEN YOU WERE NOT ABLE TO PAY THE MORTGAGE OR RENT ON TIME?: NO

## 2025-04-01 SDOH — ECONOMIC STABILITY: TRANSPORTATION INSECURITY
IN THE PAST 12 MONTHS, HAS THE LACK OF TRANSPORTATION KEPT YOU FROM MEDICAL APPOINTMENTS OR FROM GETTING MEDICATIONS?: NO

## 2025-04-01 ASSESSMENT — PATIENT HEALTH QUESTIONNAIRE - PHQ9
SUM OF ALL RESPONSES TO PHQ9 QUESTIONS 1 & 2: 1
2. FEELING DOWN, DEPRESSED OR HOPELESS: SEVERAL DAYS
SUM OF ALL RESPONSES TO PHQ QUESTIONS 1-9: 1
SUM OF ALL RESPONSES TO PHQ QUESTIONS 1-9: 1
1. LITTLE INTEREST OR PLEASURE IN DOING THINGS: NOT AT ALL
SUM OF ALL RESPONSES TO PHQ QUESTIONS 1-9: 1
SUM OF ALL RESPONSES TO PHQ QUESTIONS 1-9: 1
2. FEELING DOWN, DEPRESSED OR HOPELESS: SEVERAL DAYS
1. LITTLE INTEREST OR PLEASURE IN DOING THINGS: NOT AT ALL

## 2025-04-04 ENCOUNTER — OFFICE VISIT (OUTPATIENT)
Dept: INTERNAL MEDICINE CLINIC | Age: 85
End: 2025-04-04
Payer: MEDICARE

## 2025-04-04 VITALS
SYSTOLIC BLOOD PRESSURE: 122 MMHG | OXYGEN SATURATION: 92 % | TEMPERATURE: 97.2 F | HEART RATE: 65 BPM | DIASTOLIC BLOOD PRESSURE: 58 MMHG | BODY MASS INDEX: 34.16 KG/M2 | WEIGHT: 199 LBS

## 2025-04-04 DIAGNOSIS — M54.50 CHRONIC BILATERAL LOW BACK PAIN WITHOUT SCIATICA: ICD-10-CM

## 2025-04-04 DIAGNOSIS — T46.6X5A MYALGIA DUE TO STATIN: ICD-10-CM

## 2025-04-04 DIAGNOSIS — M79.10 MYALGIA DUE TO STATIN: ICD-10-CM

## 2025-04-04 DIAGNOSIS — I10 BENIGN ESSENTIAL HTN: Primary | ICD-10-CM

## 2025-04-04 DIAGNOSIS — N18.31 STAGE 3A CHRONIC KIDNEY DISEASE (HCC): ICD-10-CM

## 2025-04-04 DIAGNOSIS — E78.2 HYPERLIPIDEMIA, MIXED: ICD-10-CM

## 2025-04-04 DIAGNOSIS — M96.1 POST LAMINECTOMY SYNDROME: ICD-10-CM

## 2025-04-04 DIAGNOSIS — R73.01 IMPAIRED FASTING GLUCOSE: ICD-10-CM

## 2025-04-04 DIAGNOSIS — I25.10 CORONARY ARTERY DISEASE INVOLVING NATIVE CORONARY ARTERY OF NATIVE HEART WITHOUT ANGINA PECTORIS: ICD-10-CM

## 2025-04-04 DIAGNOSIS — I50.32 CHRONIC DIASTOLIC CHF (CONGESTIVE HEART FAILURE) (HCC): ICD-10-CM

## 2025-04-04 DIAGNOSIS — G89.29 CHRONIC BILATERAL LOW BACK PAIN WITHOUT SCIATICA: ICD-10-CM

## 2025-04-04 DIAGNOSIS — M16.0 BILATERAL PRIMARY OSTEOARTHRITIS OF HIP: ICD-10-CM

## 2025-04-04 PROBLEM — M85.80 OSTEOPENIA: Status: RESOLVED | Noted: 2017-03-02 | Resolved: 2025-04-04

## 2025-04-04 PROBLEM — I50.9 CONGESTIVE HEART FAILURE, UNSPECIFIED HF CHRONICITY, UNSPECIFIED HEART FAILURE TYPE (HCC): Status: RESOLVED | Noted: 2024-06-06 | Resolved: 2025-04-04

## 2025-04-04 PROCEDURE — G8417 CALC BMI ABV UP PARAM F/U: HCPCS | Performed by: INTERNAL MEDICINE

## 2025-04-04 PROCEDURE — 1123F ACP DISCUSS/DSCN MKR DOCD: CPT | Performed by: INTERNAL MEDICINE

## 2025-04-04 PROCEDURE — G2211 COMPLEX E/M VISIT ADD ON: HCPCS | Performed by: INTERNAL MEDICINE

## 2025-04-04 PROCEDURE — 1159F MED LIST DOCD IN RCRD: CPT | Performed by: INTERNAL MEDICINE

## 2025-04-04 PROCEDURE — 1090F PRES/ABSN URINE INCON ASSESS: CPT | Performed by: INTERNAL MEDICINE

## 2025-04-04 PROCEDURE — 1036F TOBACCO NON-USER: CPT | Performed by: INTERNAL MEDICINE

## 2025-04-04 PROCEDURE — 3078F DIAST BP <80 MM HG: CPT | Performed by: INTERNAL MEDICINE

## 2025-04-04 PROCEDURE — G8427 DOCREV CUR MEDS BY ELIG CLIN: HCPCS | Performed by: INTERNAL MEDICINE

## 2025-04-04 PROCEDURE — 99214 OFFICE O/P EST MOD 30 MIN: CPT | Performed by: INTERNAL MEDICINE

## 2025-04-04 PROCEDURE — 3074F SYST BP LT 130 MM HG: CPT | Performed by: INTERNAL MEDICINE

## 2025-04-04 PROCEDURE — G8399 PT W/DXA RESULTS DOCUMENT: HCPCS | Performed by: INTERNAL MEDICINE

## 2025-04-04 NOTE — PROGRESS NOTES
[facility]  Internal Medicine  Patient Encounter  Hill James D.O., [facility]        Chief Complaint   Patient presents with    Follow-up     3 week  Patient saw Rebecca Monteiro NP @ pain clinic 03/12/2025 - see note Initiated Cymbalta 20mg QD & Hydrocodone 7.5/325mg TID [currently taking BID]    Check-Up    Medication Check       HPI: 85 y.o. female seen today for for routine checkup regarding the status of current issues listed below along with a medication review reconciliation.  She is also following up from her visit with [facility], pain management.    Patient has multiple medical problems including but not limited to hypertension, hyperlipidemia, coronary artery disease, PCI with stent, GERD, osteoarthritis multiple joints, chronic low back pain, chronic kidney disease stage IIIa, chronic diastolic congestive heart failure.      With regards to her chronic low back pain, she has several pain generators including lumbar spinal stenosis, lumbar spondylolisthesis, lumbar facet arthropathy, fibromyalgia, lumbar degenerative joint disease.  Patient has had several lumbar interventions including a lumbar fusion in 2014, repeat laminectomy in 2019, spinal cord stimulator placement.  She has had multiple injection therapies.  Patient has been seen by the [facility] pain management group initially on 3/12/2025.  She was started on hydrocodone/acetaminophen 7.5/325 every 8 hours as needed along with Cymbalta 20 mg daily.  She was also given a topical 1.8% lidocaine therapy.  She remains on gabapentin through our office.  Patient had been on tramadol but this has since been discontinued by pain management. She did have some SI joint injections by Dr. Giles.  She had releief for about 2 weeks.  Patient continues to have left groin pain more than the right.  X-rays on 3/20/2025 did confirm again the presence of moderate osteoarthritis.  Overall, she is doing better.  She is using

## 2025-04-10 ENCOUNTER — OFFICE VISIT (OUTPATIENT)
Dept: PAIN MANAGEMENT | Age: 85
End: 2025-04-10
Payer: MEDICARE

## 2025-04-10 VITALS
OXYGEN SATURATION: 98 % | HEART RATE: 93 BPM | SYSTOLIC BLOOD PRESSURE: 135 MMHG | TEMPERATURE: 96.8 F | DIASTOLIC BLOOD PRESSURE: 87 MMHG

## 2025-04-10 DIAGNOSIS — M16.0 PRIMARY OSTEOARTHRITIS OF BOTH HIPS: ICD-10-CM

## 2025-04-10 DIAGNOSIS — M47.816 FACET HYPERTROPHY OF LUMBAR REGION: ICD-10-CM

## 2025-04-10 DIAGNOSIS — G89.29 CHRONIC MIDLINE LOW BACK PAIN WITH SCIATICA, SCIATICA LATERALITY UNSPECIFIED: ICD-10-CM

## 2025-04-10 DIAGNOSIS — M54.40 CHRONIC MIDLINE LOW BACK PAIN WITH SCIATICA, SCIATICA LATERALITY UNSPECIFIED: ICD-10-CM

## 2025-04-10 DIAGNOSIS — M43.17 SPONDYLOLISTHESIS OF LUMBOSACRAL REGION: ICD-10-CM

## 2025-04-10 DIAGNOSIS — G89.4 CHRONIC PAIN SYNDROME: ICD-10-CM

## 2025-04-10 DIAGNOSIS — M96.1 FAILED BACK SYNDROME, LUMBAR: ICD-10-CM

## 2025-04-10 DIAGNOSIS — M51.361 DEGENERATION OF INTERVERTEBRAL DISC OF LUMBAR REGION WITH LOWER EXTREMITY PAIN: ICD-10-CM

## 2025-04-10 DIAGNOSIS — M47.814 THORACIC SPONDYLOSIS: ICD-10-CM

## 2025-04-10 DIAGNOSIS — E66.1 CLASS 1 DRUG-INDUCED OBESITY WITH BODY MASS INDEX (BMI) OF 33.0 TO 33.9 IN ADULT, UNSPECIFIED WHETHER SERIOUS COMORBIDITY PRESENT: ICD-10-CM

## 2025-04-10 DIAGNOSIS — E66.811 CLASS 1 DRUG-INDUCED OBESITY WITH BODY MASS INDEX (BMI) OF 33.0 TO 33.9 IN ADULT, UNSPECIFIED WHETHER SERIOUS COMORBIDITY PRESENT: ICD-10-CM

## 2025-04-10 DIAGNOSIS — M19.019 ARTHRITIS OF SHOULDER, UNSPECIFIED LATERALITY: ICD-10-CM

## 2025-04-10 PROCEDURE — G8427 DOCREV CUR MEDS BY ELIG CLIN: HCPCS | Performed by: NURSE PRACTITIONER

## 2025-04-10 PROCEDURE — G8417 CALC BMI ABV UP PARAM F/U: HCPCS | Performed by: NURSE PRACTITIONER

## 2025-04-10 PROCEDURE — 1159F MED LIST DOCD IN RCRD: CPT | Performed by: NURSE PRACTITIONER

## 2025-04-10 PROCEDURE — 1036F TOBACCO NON-USER: CPT | Performed by: NURSE PRACTITIONER

## 2025-04-10 PROCEDURE — 1123F ACP DISCUSS/DSCN MKR DOCD: CPT | Performed by: NURSE PRACTITIONER

## 2025-04-10 PROCEDURE — 3075F SYST BP GE 130 - 139MM HG: CPT | Performed by: NURSE PRACTITIONER

## 2025-04-10 PROCEDURE — 1090F PRES/ABSN URINE INCON ASSESS: CPT | Performed by: NURSE PRACTITIONER

## 2025-04-10 PROCEDURE — 3079F DIAST BP 80-89 MM HG: CPT | Performed by: NURSE PRACTITIONER

## 2025-04-10 PROCEDURE — 99214 OFFICE O/P EST MOD 30 MIN: CPT | Performed by: NURSE PRACTITIONER

## 2025-04-10 PROCEDURE — G8399 PT W/DXA RESULTS DOCUMENT: HCPCS | Performed by: NURSE PRACTITIONER

## 2025-04-10 RX ORDER — LIDOCAINE 36 MG/1
PATCH TOPICAL
Qty: 4 PATCH | Refills: 0 | Status: SHIPPED | OUTPATIENT
Start: 2025-04-10

## 2025-04-10 RX ORDER — HYDROCODONE BITARTRATE AND ACETAMINOPHEN 7.5; 325 MG/1; MG/1
1 TABLET ORAL 2 TIMES DAILY PRN
Qty: 60 TABLET | Refills: 0 | Status: SHIPPED | OUTPATIENT
Start: 2025-04-10 | End: 2025-05-10

## 2025-04-10 NOTE — PROGRESS NOTES
Heidi Rogel  1940  [id number]    HISTORY OF PRESENT ILLNESS: Ms. Rogel is a 85 y.o. female returns for a follow up visit for pain management  She has a diagnosis of   1. Chronic pain syndrome    2. Degeneration of intervertebral disc of lumbar region with lower extremity pain    3. Facet hypertrophy of lumbar region    4. Spondylolisthesis of lumbosacral region    5. Chronic midline low back pain with sciatica, sciatica laterality unspecified    6. Failed back syndrome, lumbar    7. Thoracic spondylosis    8. Primary osteoarthritis of both hips    9. Arthritis of shoulder, unspecified laterality    10. 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 Current treatment regimen has helped relieve about 50% of the pain.  She has diarrhea, nausea, and buzzing in left ear, bloating pain around right side any side effects from the current pain regimen. Patient reports that since the last follow up visit the physical functioning is better, family/social relationships are better, mood is better sleep patterns are worse, and that the overall functioning is better.  Patient denies misusing/abusing her narcotic pain medications or using any illegal drugs.      Upon obtaining medical history from Ms. Rogel states that pain is manageable on current pain therapy. Takes the pain medications twice a day which works well. She stopped taking the Cymbalta as it was helping with the symptoms yet caused unwanted side effects. Mood/anxiety is stable. Sleep is fair with an average of 5-6 hours. Denies to having issues of constipation. Tolerating activities/house chores with moderate tenderness to the lower back.

## 2025-04-18 ENCOUNTER — HOSPITAL ENCOUNTER (OUTPATIENT)
Age: 85
Discharge: HOME | End: 2025-04-18
Payer: MEDICARE

## 2025-04-18 DIAGNOSIS — R73.01 IMPAIRED FASTING GLUCOSE: ICD-10-CM

## 2025-04-18 DIAGNOSIS — N18.31 STAGE 3A CHRONIC KIDNEY DISEASE (HCC): ICD-10-CM

## 2025-04-18 DIAGNOSIS — I25.10 CORONARY ARTERY DISEASE INVOLVING NATIVE CORONARY ARTERY OF NATIVE HEART WITHOUT ANGINA PECTORIS: ICD-10-CM

## 2025-04-18 DIAGNOSIS — I10 BENIGN ESSENTIAL HTN: ICD-10-CM

## 2025-04-18 DIAGNOSIS — E78.2 HYPERLIPIDEMIA, MIXED: ICD-10-CM

## 2025-04-18 LAB
ALBUMIN SERPL-MCNC: 3.9 G/DL (ref 3.4–5)
ALBUMIN/GLOB SERPL: 1.3 {RATIO} (ref 1.1–2.2)
ALP SERPL-CCNC: 95 U/L (ref 40–129)
ALT SERPL-CCNC: 22 U/L (ref 10–40)
ANION GAP SERPL CALCULATED.3IONS-SCNC: 12 MMOL/L (ref 3–16)
AST SERPL-CCNC: 25 U/L (ref 15–37)
BASOPHILS # BLD: 0.1 K/UL (ref 0–0.2)
BASOPHILS NFR BLD: 1.2 %
BILIRUB SERPL-MCNC: 0.3 MG/DL (ref 0–1)
BUN SERPL-MCNC: 29 MG/DL (ref 7–20)
CALCIUM SERPL-MCNC: 9.3 MG/DL (ref 8.3–10.6)
CHLORIDE SERPL-SCNC: 103 MMOL/L (ref 99–110)
CHOLEST SERPL-MCNC: 226 MG/DL (ref 0–199)
CO2 SERPL-SCNC: 27 MMOL/L (ref 21–32)
CREAT SERPL-MCNC: 1.2 MG/DL (ref 0.6–1.2)
DEPRECATED RDW RBC AUTO: 14.5 % (ref 12.4–15.4)
EOSINOPHIL # BLD: 0.2 K/UL (ref 0–0.6)
EOSINOPHIL NFR BLD: 4 %
EST. AVERAGE GLUCOSE BLD GHB EST-MCNC: 119.8 MG/DL
GFR SERPLBLD CREATININE-BSD FMLA CKD-EPI: 45 ML/MIN/{1.73_M2}
GLUCOSE SERPL-MCNC: 95 MG/DL (ref 70–99)
HBA1C MFR BLD: 5.8 %
HCT VFR BLD AUTO: 41.7 % (ref 36–48)
HDLC SERPL-MCNC: 36 MG/DL (ref 40–60)
HGB BLD-MCNC: 14 G/DL (ref 12–16)
LDLC SERPL CALC-MCNC: ABNORMAL MG/DL
LDLC SERPL-MCNC: 115 MG/DL
LYMPHOCYTES # BLD: 1.5 K/UL (ref 1–5.1)
LYMPHOCYTES NFR BLD: 29 %
MCH RBC QN AUTO: 32.8 PG (ref 26–34)
MCHC RBC AUTO-ENTMCNC: 33.6 G/DL (ref 31–36)
MCV RBC AUTO: 97.7 FL (ref 80–100)
MONOCYTES # BLD: 0.4 K/UL (ref 0–1.3)
MONOCYTES NFR BLD: 8 %
NEUTROPHILS # BLD: 2.9 K/UL (ref 1.7–7.7)
NEUTROPHILS NFR BLD: 57.8 %
PLATELET # BLD AUTO: 177 K/UL (ref 135–450)
PMV BLD AUTO: 10.5 FL (ref 5–10.5)
POTASSIUM SERPL-SCNC: 4.1 MMOL/L (ref 3.5–5.1)
PROT SERPL-MCNC: 7 G/DL (ref 6.4–8.2)
RBC # BLD AUTO: 4.27 M/UL (ref 4–5.2)
SODIUM SERPL-SCNC: 142 MMOL/L (ref 136–145)
TRIGL SERPL-MCNC: 356 MG/DL (ref 0–150)
VLDLC SERPL CALC-MCNC: ABNORMAL MG/DL
WBC # BLD AUTO: 5 K/UL (ref 4–11)

## 2025-04-18 PROCEDURE — 36415 COLL VENOUS BLD VENIPUNCTURE: CPT

## 2025-04-18 PROCEDURE — 85025 COMPLETE CBC W/AUTO DIFF WBC: CPT

## 2025-04-18 PROCEDURE — 80061 LIPID PANEL: CPT

## 2025-04-18 PROCEDURE — 83036 HEMOGLOBIN GLYCOSYLATED A1C: CPT

## 2025-04-18 PROCEDURE — 80053 COMPREHEN METABOLIC PANEL: CPT

## 2025-04-20 ENCOUNTER — RESULTS FOLLOW-UP (OUTPATIENT)
Dept: INTERNAL MEDICINE CLINIC | Age: 85
End: 2025-04-20

## 2025-05-08 ENCOUNTER — OFFICE VISIT (OUTPATIENT)
Dept: PAIN MANAGEMENT | Age: 85
End: 2025-05-08

## 2025-05-08 VITALS
TEMPERATURE: 96.9 F | OXYGEN SATURATION: 91 % | HEART RATE: 76 BPM | SYSTOLIC BLOOD PRESSURE: 141 MMHG | DIASTOLIC BLOOD PRESSURE: 75 MMHG

## 2025-05-08 DIAGNOSIS — E66.811 CLASS 1 DRUG-INDUCED OBESITY WITH BODY MASS INDEX (BMI) OF 33.0 TO 33.9 IN ADULT, UNSPECIFIED WHETHER SERIOUS COMORBIDITY PRESENT: ICD-10-CM

## 2025-05-08 DIAGNOSIS — M47.816 FACET HYPERTROPHY OF LUMBAR REGION: ICD-10-CM

## 2025-05-08 DIAGNOSIS — E66.1 CLASS 1 DRUG-INDUCED OBESITY WITH BODY MASS INDEX (BMI) OF 33.0 TO 33.9 IN ADULT, UNSPECIFIED WHETHER SERIOUS COMORBIDITY PRESENT: ICD-10-CM

## 2025-05-08 DIAGNOSIS — M51.361 DEGENERATION OF INTERVERTEBRAL DISC OF LUMBAR REGION WITH LOWER EXTREMITY PAIN: ICD-10-CM

## 2025-05-08 DIAGNOSIS — M54.40 CHRONIC MIDLINE LOW BACK PAIN WITH SCIATICA, SCIATICA LATERALITY UNSPECIFIED: ICD-10-CM

## 2025-05-08 DIAGNOSIS — M19.019 ARTHRITIS OF SHOULDER, UNSPECIFIED LATERALITY: ICD-10-CM

## 2025-05-08 DIAGNOSIS — M96.1 FAILED BACK SYNDROME, LUMBAR: ICD-10-CM

## 2025-05-08 DIAGNOSIS — M43.17 SPONDYLOLISTHESIS OF LUMBOSACRAL REGION: ICD-10-CM

## 2025-05-08 DIAGNOSIS — M47.814 THORACIC SPONDYLOSIS: ICD-10-CM

## 2025-05-08 DIAGNOSIS — G89.4 CHRONIC PAIN SYNDROME: ICD-10-CM

## 2025-05-08 DIAGNOSIS — M16.0 PRIMARY OSTEOARTHRITIS OF BOTH HIPS: ICD-10-CM

## 2025-05-08 DIAGNOSIS — G89.29 CHRONIC MIDLINE LOW BACK PAIN WITH SCIATICA, SCIATICA LATERALITY UNSPECIFIED: ICD-10-CM

## 2025-05-08 RX ORDER — LIDOCAINE 36 MG/1
PATCH TOPICAL
Qty: 4 PATCH | Refills: 0 | Status: SHIPPED | OUTPATIENT
Start: 2025-05-08

## 2025-05-08 RX ORDER — HYDROCODONE BITARTRATE AND ACETAMINOPHEN 7.5; 325 MG/1; MG/1
1 TABLET ORAL 2 TIMES DAILY PRN
Qty: 60 TABLET | Refills: 0 | Status: SHIPPED | OUTPATIENT
Start: 2025-05-08 | End: 2025-06-07

## 2025-05-08 NOTE — PROGRESS NOTES
Heidi Rogel  1940  [id number]    HISTORY OF PRESENT ILLNESS: Ms. Rogel is a 85 y.o. female returns for a follow up visit for pain management  She has a diagnosis of   1. Chronic pain syndrome    2. Degeneration of intervertebral disc of lumbar region with lower extremity pain    3. Facet hypertrophy of lumbar region    4. Spondylolisthesis of lumbosacral region    5. Chronic midline low back pain with sciatica, sciatica laterality unspecified    6. Failed back syndrome, lumbar    7. Thoracic spondylosis    8. Primary osteoarthritis of both hips    9. Arthritis of shoulder, unspecified laterality    10. 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 Current treatment regimen has helped relieve about 0% of the pain.  She has fatigue/weakness any side effects from the current pain regimen. Patient reports that since the last follow up visit the physical functioning is unchanged, family/social relationships are unchanged, mood is better sleep patterns are worse, and that the overall functioning is better.  Patient denies misusing/abusing her narcotic pain medications or using any illegal drugs.      Upon obtaining medical history from Ms. Rogel states that pain is manageable on current pain therapy. Takes the pain medications as prescribed. Mood/anxiety is stable. Sleep is fair with an average of 5-6 hours. Denies to having issues of constipation. Tolerating activities/house chores with moderate tenderness to the lower back.     ALLERGIES: Patients list of allergies were reviewed     MEDICATIONS: Ms. Rogel list of medications were reviewed.Her current medications are   Outpatient

## 2025-05-24 ENCOUNTER — OFFICE VISIT (OUTPATIENT)
Age: 85
End: 2025-05-24

## 2025-05-24 VITALS
HEART RATE: 71 BPM | SYSTOLIC BLOOD PRESSURE: 150 MMHG | DIASTOLIC BLOOD PRESSURE: 68 MMHG | RESPIRATION RATE: 18 BRPM | OXYGEN SATURATION: 93 % | TEMPERATURE: 98.1 F

## 2025-05-24 DIAGNOSIS — M10.9 ACUTE GOUT INVOLVING TOE OF LEFT FOOT, UNSPECIFIED CAUSE: Primary | ICD-10-CM

## 2025-05-24 DIAGNOSIS — I10 PRIMARY HYPERTENSION: ICD-10-CM

## 2025-05-24 RX ORDER — COLCHICINE 0.6 MG/1
TABLET ORAL
Qty: 3 TABLET | Refills: 0 | Status: SHIPPED | OUTPATIENT
Start: 2025-05-24

## 2025-05-24 NOTE — PROGRESS NOTES
Heidi Rogel (:  1940) is a 85 y.o. female,New patient, here for evaluation of the following chief complaint(s):  Foot Pain (Pt c/o left foot pain and swelling x 1 day)      Assessment & Plan :  Visit Diagnoses and Associated Orders         Acute gout involving toe of left foot, unspecified cause    -  Primary    colchicine (COLCRYS) 0.6 MG tablet [1821]             Primary hypertension        Amb Referral to Primary Care [ACZ876 Custom]                 Patient was seen and evaluated today for possible gout infection.  She denies having any history of this but she has classic symptoms of gout being out of that left first toe joint.  I will go ahead and try her on colchicine and discussed with patient that if she does not feel better by tomorrow to give me a call and I will place her on some antibiotics for cellulitis.  Advised to continue taking her prescribed narcotics along with an extra dose of Tylenol to help with pain.  RICE instructions given.  Return to office and ER precautions given.   Follow up in 3-5 days if symptoms persist or if symptoms worsen.       Subjective :  HPI  HPI:   85 y.o. female presents with symptoms of left foot pain since yesterday.  States having pain, redness and swelling noted to the big toe and around it.  States she did try some different foods at a buffet 2 nights ago and then started with the pain and swelling and tenderness the next day.  States the only thing that she has been taking is her prescribed narcotics which do not help much with the pain.  She denies having any history of gout.  She denies fever, chills, weakness, dizziness, chest pain, shortness of breath or abdominal pain.         Vitals:    25 1216 25 1227   BP: (!) 153/83 (!) 150/68   Pulse: 71    Resp: 18    Temp: 98.1 °F (36.7 °C)    SpO2: 93%           Objective   Physical Exam  Constitutional:       Appearance: Normal appearance.   HENT:      Head: Normocephalic.   Eyes:

## 2025-05-30 ENCOUNTER — SCHEDULED TELEPHONE ENCOUNTER (OUTPATIENT)
Dept: INTERNAL MEDICINE CLINIC | Age: 85
End: 2025-05-30

## 2025-05-30 DIAGNOSIS — M10.9 PODAGRA: Primary | ICD-10-CM

## 2025-05-30 RX ORDER — PREDNISONE 10 MG/1
TABLET ORAL
Qty: 35 TABLET | Refills: 0 | Status: SHIPPED | OUTPATIENT
Start: 2025-05-30 | End: 2025-06-09

## 2025-05-30 NOTE — PROGRESS NOTES
[facility]  Internal Medicine  Patient Encounter  Hill James D.O., BASIM Suazoyeny Rogel is a 85 y.o. female evaluated via audio-only technology on 5/30/2025 for Foot Pain  .      Assessment & Plan:   Podagra  -     predniSONE (DELTASONE) 10 MG tablet; Take 4 pills daily for 5 days then 2 pills daily for 5 days then 1 pill daily for 3 days then 1/2 pill daily for 3 days then stop., Disp-35 tablet, R-0Normal    Advised patient to call should symptoms persist, worsen or if new symptoms develop  Will need to obtain uric acid level when symptoms are resolved  Will need to consider changing hydrochlorothiazide to another medication for her blood pressure      No follow-ups on file.         Chief Complaint   Patient presents with    Foot Pain         Subjective:     Trina is an 85-year-old  female multiple medical problems including but not limited to hypertension, chronic kidney disease stage IIIa, chronic diastolic congestive heart failure, coronary artery disease who is being evaluated today via telemedicine audio only visit with complaint of left toe and foot pain.  Symptoms started 1 week ago.  She states the first joint of her foot had become acutely red, swollen, and very painful.  She states she was at an event and ate a lot of different foods that she is not used to eating.  She cannot recall exactly what she ate.  She went to the urgent care the next day and was given colchicine for what sounds like gout.  She states she was only given 3 capsules.  The medicine did help initially but symptoms quickly escalated again.  Pain is severe, increases with weightbearing and walking.  Pain is also increased with simply the covers laying on top of her foot    Patient has submitted 2 pictures that are viewed in the media section of her electronic health record.  These pictures show findings consistent with erythema, edema of the first MTP joint on the left.      Prior to Admission  No

## 2025-06-05 ENCOUNTER — OFFICE VISIT (OUTPATIENT)
Dept: PAIN MANAGEMENT | Age: 85
End: 2025-06-05
Payer: MEDICARE

## 2025-06-05 VITALS
OXYGEN SATURATION: 94 % | HEART RATE: 68 BPM | DIASTOLIC BLOOD PRESSURE: 79 MMHG | TEMPERATURE: 92.5 F | SYSTOLIC BLOOD PRESSURE: 179 MMHG

## 2025-06-05 DIAGNOSIS — M47.816 FACET HYPERTROPHY OF LUMBAR REGION: ICD-10-CM

## 2025-06-05 DIAGNOSIS — M47.814 THORACIC SPONDYLOSIS: ICD-10-CM

## 2025-06-05 DIAGNOSIS — M96.1 FAILED BACK SYNDROME, LUMBAR: ICD-10-CM

## 2025-06-05 DIAGNOSIS — G89.4 CHRONIC PAIN SYNDROME: ICD-10-CM

## 2025-06-05 DIAGNOSIS — M54.40 CHRONIC MIDLINE LOW BACK PAIN WITH SCIATICA, SCIATICA LATERALITY UNSPECIFIED: ICD-10-CM

## 2025-06-05 DIAGNOSIS — M43.17 SPONDYLOLISTHESIS OF LUMBOSACRAL REGION: ICD-10-CM

## 2025-06-05 DIAGNOSIS — M16.0 PRIMARY OSTEOARTHRITIS OF BOTH HIPS: ICD-10-CM

## 2025-06-05 DIAGNOSIS — E66.1 CLASS 1 DRUG-INDUCED OBESITY WITH BODY MASS INDEX (BMI) OF 33.0 TO 33.9 IN ADULT, UNSPECIFIED WHETHER SERIOUS COMORBIDITY PRESENT: ICD-10-CM

## 2025-06-05 DIAGNOSIS — G89.29 CHRONIC MIDLINE LOW BACK PAIN WITH SCIATICA, SCIATICA LATERALITY UNSPECIFIED: ICD-10-CM

## 2025-06-05 DIAGNOSIS — M51.361 DEGENERATION OF INTERVERTEBRAL DISC OF LUMBAR REGION WITH LOWER EXTREMITY PAIN: ICD-10-CM

## 2025-06-05 DIAGNOSIS — M19.019 ARTHRITIS OF SHOULDER, UNSPECIFIED LATERALITY: ICD-10-CM

## 2025-06-05 DIAGNOSIS — E66.811 CLASS 1 DRUG-INDUCED OBESITY WITH BODY MASS INDEX (BMI) OF 33.0 TO 33.9 IN ADULT, UNSPECIFIED WHETHER SERIOUS COMORBIDITY PRESENT: ICD-10-CM

## 2025-06-05 PROCEDURE — 1123F ACP DISCUSS/DSCN MKR DOCD: CPT | Performed by: NURSE PRACTITIONER

## 2025-06-05 PROCEDURE — 3077F SYST BP >= 140 MM HG: CPT | Performed by: NURSE PRACTITIONER

## 2025-06-05 PROCEDURE — G8417 CALC BMI ABV UP PARAM F/U: HCPCS | Performed by: NURSE PRACTITIONER

## 2025-06-05 PROCEDURE — G8427 DOCREV CUR MEDS BY ELIG CLIN: HCPCS | Performed by: NURSE PRACTITIONER

## 2025-06-05 PROCEDURE — 1090F PRES/ABSN URINE INCON ASSESS: CPT | Performed by: NURSE PRACTITIONER

## 2025-06-05 PROCEDURE — 99214 OFFICE O/P EST MOD 30 MIN: CPT | Performed by: NURSE PRACTITIONER

## 2025-06-05 PROCEDURE — 3078F DIAST BP <80 MM HG: CPT | Performed by: NURSE PRACTITIONER

## 2025-06-05 PROCEDURE — G8399 PT W/DXA RESULTS DOCUMENT: HCPCS | Performed by: NURSE PRACTITIONER

## 2025-06-05 PROCEDURE — 1036F TOBACCO NON-USER: CPT | Performed by: NURSE PRACTITIONER

## 2025-06-05 PROCEDURE — 1159F MED LIST DOCD IN RCRD: CPT | Performed by: NURSE PRACTITIONER

## 2025-06-05 RX ORDER — HYDROCODONE BITARTRATE AND ACETAMINOPHEN 7.5; 325 MG/1; MG/1
1 TABLET ORAL 2 TIMES DAILY PRN
Qty: 60 TABLET | Refills: 0 | Status: SHIPPED | OUTPATIENT
Start: 2025-06-05 | End: 2025-07-05

## 2025-06-05 RX ORDER — LIDOCAINE 36 MG/1
PATCH TOPICAL
Qty: 4 PATCH | Refills: 0 | Status: SHIPPED | OUTPATIENT
Start: 2025-06-05

## 2025-06-05 NOTE — PROGRESS NOTES
Heidipatti Rogel  1940  [id number]    HISTORY OF PRESENT ILLNESS: Ms. Rogel is a 85 y.o. female returns for a follow up visit for pain management  She has a diagnosis of   1. Chronic pain syndrome    2. Degeneration of intervertebral disc of lumbar region with lower extremity pain    3. Facet hypertrophy of lumbar region    4. Spondylolisthesis of lumbosacral region    5. Chronic midline low back pain with sciatica, sciatica laterality unspecified    6. Failed back syndrome, lumbar    7. Thoracic spondylosis    8. Arthritis of shoulder, unspecified laterality    9. Primary osteoarthritis of both hips    10. Class 1 drug-induced obesity with body mass index (BMI) of 33.0 to 33.9 in adult, unspecified whether serious comorbidity present      As per Information Obtained from the PADT (Patient Assessment and Documentation Tool)    She complains of pain in the  Lower back  She rates the pain 5/10 and describes it as aching. Current treatment regimen has helped relieve about 50% of the pain.  She denies any side effects from the current pain regimen. Patient reports that since the last follow up visit the physical functioning is better, family/social relationships are better, mood is unchanged sleep patterns are unchanged, and that the overall functioning is unchanged.  Patient denies misusing/abusing her narcotic pain medications or using any illegal drugs.      Upon obtaining medical history from Ms. Rogel states that pain is manageable on current pain therapy. Takes the pain medications as prescribed. She was diagnosed with gout at an urgent care, was started on Colchicine, then prednisone through her PCP. She is currently fairing much better to the left foot. Mood/anxiety is stable. Sleep is fair with an average of 5-6 hours. Denies to having issues of constipation. Tolerating activities/house chores with moderate tenderness to the lower back.     ALLERGIES: Patients list of allergies were reviewed

## 2025-06-09 ENCOUNTER — TELEPHONE (OUTPATIENT)
Dept: PAIN MANAGEMENT | Age: 85
End: 2025-06-09

## 2025-06-13 ENCOUNTER — RESULTS FOLLOW-UP (OUTPATIENT)
Dept: INTERNAL MEDICINE CLINIC | Age: 85
End: 2025-06-13

## 2025-06-13 ENCOUNTER — TELEPHONE (OUTPATIENT)
Dept: INTERNAL MEDICINE CLINIC | Age: 85
End: 2025-06-13

## 2025-06-13 ENCOUNTER — OFFICE VISIT (OUTPATIENT)
Dept: INTERNAL MEDICINE CLINIC | Age: 85
End: 2025-06-13

## 2025-06-13 VITALS
WEIGHT: 200 LBS | DIASTOLIC BLOOD PRESSURE: 84 MMHG | HEART RATE: 64 BPM | OXYGEN SATURATION: 98 % | BODY MASS INDEX: 34.33 KG/M2 | SYSTOLIC BLOOD PRESSURE: 168 MMHG | TEMPERATURE: 97 F

## 2025-06-13 DIAGNOSIS — R21 SKIN ERUPTION: ICD-10-CM

## 2025-06-13 DIAGNOSIS — R21 SKIN ERUPTION: Primary | ICD-10-CM

## 2025-06-13 LAB
ALBUMIN SERPL-MCNC: 4.1 G/DL (ref 3.4–5)
ALP SERPL-CCNC: 91 U/L (ref 40–129)
ALT SERPL-CCNC: 25 U/L (ref 10–40)
ANION GAP SERPL CALCULATED.3IONS-SCNC: 13 MMOL/L (ref 3–16)
AST SERPL-CCNC: 23 U/L (ref 15–37)
BASOPHILS # BLD: 0.1 K/UL (ref 0–0.2)
BASOPHILS NFR BLD: 0.8 %
BILIRUB DIRECT SERPL-MCNC: 0.2 MG/DL (ref 0–0.3)
BILIRUB INDIRECT SERPL-MCNC: 0.3 MG/DL (ref 0–1)
BILIRUB SERPL-MCNC: 0.5 MG/DL (ref 0–1)
BUN SERPL-MCNC: 42 MG/DL (ref 7–20)
CALCIUM SERPL-MCNC: 9.5 MG/DL (ref 8.3–10.6)
CHLORIDE SERPL-SCNC: 102 MMOL/L (ref 99–110)
CO2 SERPL-SCNC: 26 MMOL/L (ref 21–32)
CREAT SERPL-MCNC: 1.3 MG/DL (ref 0.6–1.2)
CRP SERPL-MCNC: 9.4 MG/L (ref 0–5.1)
DEPRECATED RDW RBC AUTO: 14.5 % (ref 12.4–15.4)
EOSINOPHIL # BLD: 0.5 K/UL (ref 0–0.6)
EOSINOPHIL NFR BLD: 4.9 %
ERYTHROCYTE [SEDIMENTATION RATE] IN BLOOD BY WESTERGREN METHOD: 50 MM/HR (ref 0–30)
GFR SERPLBLD CREATININE-BSD FMLA CKD-EPI: 40 ML/MIN/{1.73_M2}
GLUCOSE SERPL-MCNC: 102 MG/DL (ref 70–99)
HCT VFR BLD AUTO: 45.7 % (ref 36–48)
HGB BLD-MCNC: 15.2 G/DL (ref 12–16)
LYMPHOCYTES # BLD: 1 K/UL (ref 1–5.1)
LYMPHOCYTES NFR BLD: 10 %
MCH RBC QN AUTO: 32.6 PG (ref 26–34)
MCHC RBC AUTO-ENTMCNC: 33.2 G/DL (ref 31–36)
MCV RBC AUTO: 98.1 FL (ref 80–100)
MONOCYTES # BLD: 0.5 K/UL (ref 0–1.3)
MONOCYTES NFR BLD: 5.4 %
NEUTROPHILS # BLD: 7.7 K/UL (ref 1.7–7.7)
NEUTROPHILS NFR BLD: 78.9 %
PLATELET # BLD AUTO: 152 K/UL (ref 135–450)
PMV BLD AUTO: 10.2 FL (ref 5–10.5)
POTASSIUM SERPL-SCNC: 4.8 MMOL/L (ref 3.5–5.1)
PROT SERPL-MCNC: 7.1 G/DL (ref 6.4–8.2)
RBC # BLD AUTO: 4.66 M/UL (ref 4–5.2)
SODIUM SERPL-SCNC: 141 MMOL/L (ref 136–145)
WBC # BLD AUTO: 9.7 K/UL (ref 4–11)

## 2025-06-13 RX ORDER — HYDROXYZINE HYDROCHLORIDE 25 MG/1
25 TABLET, FILM COATED ORAL EVERY 6 HOURS PRN
Qty: 30 TABLET | Refills: 1 | Status: SHIPPED | OUTPATIENT
Start: 2025-06-13

## 2025-06-13 RX ORDER — METHYLPREDNISOLONE ACETATE 80 MG/ML
80 INJECTION, SUSPENSION INTRA-ARTICULAR; INTRALESIONAL; INTRAMUSCULAR; SOFT TISSUE ONCE
Status: COMPLETED | OUTPATIENT
Start: 2025-06-13 | End: 2025-06-13

## 2025-06-13 RX ORDER — PREDNISONE 10 MG/1
TABLET ORAL
Qty: 47 TABLET | Refills: 0 | Status: SHIPPED | OUTPATIENT
Start: 2025-06-13 | End: 2025-06-23

## 2025-06-13 RX ADMIN — METHYLPREDNISOLONE ACETATE 80 MG: 80 INJECTION, SUSPENSION INTRA-ARTICULAR; INTRALESIONAL; INTRAMUSCULAR; SOFT TISSUE at 09:06

## 2025-06-13 NOTE — TELEPHONE ENCOUNTER
The patient called in for Dr. James in regards to one of her medications. When the patient  Al went to  her Rx's he was only able to  the prednisone. The patient states Dr. James had to contact her insurance for the hydrOXYzine HCl (ATARAX) 25 MG tablet. Patient wasn't sure if her insurance is needing a Prior Auth in order for it to be approved. Please advise.    Pt callback# [phone]

## 2025-06-16 ENCOUNTER — TELEPHONE (OUTPATIENT)
Dept: ADMINISTRATIVE | Age: 85
End: 2025-06-16

## 2025-06-16 NOTE — TELEPHONE ENCOUNTER
Submitted PA for hydrOXYzine HCl 25MG tablets   Via CMM Key: [id number] STATUS: Approval      CaseId:[id number];Status:Approved;Review Type:Prior Auth;Coverage Start Date:05/17/2025;Coverage End Date:06/16/2026;  Effective Date: 5/17/2025  Authorization Expiration Date: 6/16/2026    If this requires a response please respond to the pool. (P [facility] MEDICINE Pre-Auth).    Please advise patient thank you.

## 2025-06-20 ENCOUNTER — OFFICE VISIT (OUTPATIENT)
Dept: INTERNAL MEDICINE CLINIC | Age: 85
End: 2025-06-20
Payer: MEDICARE

## 2025-06-20 VITALS
BODY MASS INDEX: 33.47 KG/M2 | OXYGEN SATURATION: 96 % | TEMPERATURE: 97 F | DIASTOLIC BLOOD PRESSURE: 84 MMHG | HEART RATE: 66 BPM | WEIGHT: 195 LBS | SYSTOLIC BLOOD PRESSURE: 138 MMHG

## 2025-06-20 DIAGNOSIS — R21 SKIN ERUPTION: Primary | ICD-10-CM

## 2025-06-20 PROCEDURE — 3079F DIAST BP 80-89 MM HG: CPT | Performed by: INTERNAL MEDICINE

## 2025-06-20 PROCEDURE — 1159F MED LIST DOCD IN RCRD: CPT | Performed by: INTERNAL MEDICINE

## 2025-06-20 PROCEDURE — G8399 PT W/DXA RESULTS DOCUMENT: HCPCS | Performed by: INTERNAL MEDICINE

## 2025-06-20 PROCEDURE — 1090F PRES/ABSN URINE INCON ASSESS: CPT | Performed by: INTERNAL MEDICINE

## 2025-06-20 PROCEDURE — G8417 CALC BMI ABV UP PARAM F/U: HCPCS | Performed by: INTERNAL MEDICINE

## 2025-06-20 PROCEDURE — 3075F SYST BP GE 130 - 139MM HG: CPT | Performed by: INTERNAL MEDICINE

## 2025-06-20 PROCEDURE — G8427 DOCREV CUR MEDS BY ELIG CLIN: HCPCS | Performed by: INTERNAL MEDICINE

## 2025-06-20 PROCEDURE — 1036F TOBACCO NON-USER: CPT | Performed by: INTERNAL MEDICINE

## 2025-06-20 PROCEDURE — G2211 COMPLEX E/M VISIT ADD ON: HCPCS | Performed by: INTERNAL MEDICINE

## 2025-06-20 PROCEDURE — 99212 OFFICE O/P EST SF 10 MIN: CPT | Performed by: INTERNAL MEDICINE

## 2025-06-20 PROCEDURE — 1123F ACP DISCUSS/DSCN MKR DOCD: CPT | Performed by: INTERNAL MEDICINE

## 2025-06-20 RX ORDER — AMOXICILLIN 500 MG/1
500 CAPSULE ORAL 3 TIMES DAILY
COMMUNITY

## 2025-06-20 NOTE — PROGRESS NOTES
[facility]  Internal Medicine  Patient Encounter  Hill James D.O., [facility]        Chief Complaint   Patient presents with    Rash       HPI: 85 y.o. female seen today for a short interval follow-up regarding her skin rash.  Patient was seen on 6/13/2025.  See note for details.  Patient was found to have a extensive generalized, erythematous, maculopapular eruption.  There was no evidence of purpura, petechiae, bullae, vesicles, blisters.  Patient was given a Depo-Medrol injection and placed on a tapering schedule of prednisone.  She was also given hydroxyzine but her insurance did not cover this.  She was advised to take Benadryl.  She had no signs of anaphylaxis.  We were not able to uncover what may have been the trigger.  The rash was similar and suggestive of a drug eruption.    Patient states she feels so much better.  She has no itching and the rash is fading significantly.  She still has some faint areas but she feels the rash is going to disappear entirely.      Past Medical History:   Diagnosis Date    Acquired spondylolisthesis of lumbosacral region     Bilateral primary osteoarthritis of hip     CAD (coronary artery disease)     one stent in heart    Chronic bilateral low back pain without sciatica 02/08/2018    CKD (chronic kidney disease), stage III (HCC)     COVID-19 virus infection 09/12/2024    DDD (degenerative disc disease), lumbar     Dr. Noel    Fibromyalgia     Dr. James    GERD (gastroesophageal reflux disease)     Headache(784.0)     Heel spur 08/2016    History of lumbar spinal fusion     Hyperlipidemia     Hypertension     IBS (irritable bowel syndrome)     Lyme disease 2009    suspected    Medical history reviewed with no changes     MVP (mitral valve prolapse)     Neuroma nos     Osteoarthritis     Osteopenia     Post-laminectomy syndrome     Rotator cuff tear     Dr. Cortes    Squamous cell cancer of skin of right hand 05/2018    squamous cell carcinoma    Stress

## 2025-06-30 RX ORDER — BISOPROLOL FUMARATE AND HYDROCHLOROTHIAZIDE 10; 6.25 MG/1; MG/1
2 TABLET ORAL DAILY
Qty: 180 TABLET | Refills: 3 | Status: SHIPPED | OUTPATIENT
Start: 2025-06-30

## 2025-07-01 NOTE — PROGRESS NOTES
[facility]     Outpatient Cardiology         Chief Complaint   Patient presents with    Coronary Artery Disease    Shortness of Breath       HPI      Heidi Montoya a 85 y.o. female here for follow up for CAD, HTN, and HLD.     Hx of CAD-PCI to LAD to 2002,HTN,HLD, LVH, CKD.     CAD, on aspirin 81 mg, no angina  Hypertension, on ziac 20-12.50 mg daily, HCTZ 12.5 mg daily, Avapro 300 mg daily BP at goal, no side effects on current med regimen  Hyperlipidemia, last  (6/6/24) , in the past he has tried multiple medications with side effects.  She is very hesitant to try any new medications today.  Feeling good from a cardiac perspective      PMH  Past Medical History:   Diagnosis Date    Acquired spondylolisthesis of lumbosacral region     Bilateral primary osteoarthritis of hip     CAD (coronary artery disease)     one stent in heart    Chronic bilateral low back pain without sciatica 02/08/2018    CKD (chronic kidney disease), stage III (HCC)     COVID-19 virus infection 09/12/2024    DDD (degenerative disc disease), lumbar     Dr. Noel    Fibromyalgia     Dr. James    GERD (gastroesophageal reflux disease)     Headache(784.0)     Heel spur 08/2016    History of lumbar spinal fusion     Hyperlipidemia     Hypertension     IBS (irritable bowel syndrome)     Lyme disease 2009    suspected    Medical history reviewed with no changes     MVP (mitral valve prolapse)     Neuroma nos     Osteoarthritis     Osteopenia     Post-laminectomy syndrome     Rotator cuff tear     Dr. Cortes    Squamous cell cancer of skin of right hand 05/2018    squamous cell carcinoma    Stress incontinence, female     Urinary incontinence        PSH  Past Surgical History:   Procedure Laterality Date    BACK INJECTION Bilateral 2/27/2025    BILATERAL DIAGNOSTIC SACROILIAC JOINT INJECTION LIDOCAINE ONLY WITH FLUOROSCOPY-LOPEZ performed by George Giles MD at [facility]    BACK INJECTION

## 2025-07-03 ENCOUNTER — OFFICE VISIT (OUTPATIENT)
Dept: PAIN MANAGEMENT | Age: 85
End: 2025-07-03

## 2025-07-03 VITALS — DIASTOLIC BLOOD PRESSURE: 61 MMHG | OXYGEN SATURATION: 96 % | SYSTOLIC BLOOD PRESSURE: 134 MMHG | HEART RATE: 64 BPM

## 2025-07-03 DIAGNOSIS — M16.0 PRIMARY OSTEOARTHRITIS OF BOTH HIPS: ICD-10-CM

## 2025-07-03 DIAGNOSIS — M54.40 CHRONIC MIDLINE LOW BACK PAIN WITH SCIATICA, SCIATICA LATERALITY UNSPECIFIED: ICD-10-CM

## 2025-07-03 DIAGNOSIS — M62.830 SPASM OF MUSCLE, BACK: ICD-10-CM

## 2025-07-03 DIAGNOSIS — E66.811 CLASS 1 DRUG-INDUCED OBESITY WITH BODY MASS INDEX (BMI) OF 33.0 TO 33.9 IN ADULT, UNSPECIFIED WHETHER SERIOUS COMORBIDITY PRESENT: ICD-10-CM

## 2025-07-03 DIAGNOSIS — M47.816 FACET HYPERTROPHY OF LUMBAR REGION: ICD-10-CM

## 2025-07-03 DIAGNOSIS — M47.814 THORACIC SPONDYLOSIS: ICD-10-CM

## 2025-07-03 DIAGNOSIS — M19.019 ARTHRITIS OF SHOULDER, UNSPECIFIED LATERALITY: ICD-10-CM

## 2025-07-03 DIAGNOSIS — M96.1 FAILED BACK SYNDROME, LUMBAR: ICD-10-CM

## 2025-07-03 DIAGNOSIS — E66.1 CLASS 1 DRUG-INDUCED OBESITY WITH BODY MASS INDEX (BMI) OF 33.0 TO 33.9 IN ADULT, UNSPECIFIED WHETHER SERIOUS COMORBIDITY PRESENT: ICD-10-CM

## 2025-07-03 DIAGNOSIS — G89.29 CHRONIC MIDLINE LOW BACK PAIN WITH SCIATICA, SCIATICA LATERALITY UNSPECIFIED: ICD-10-CM

## 2025-07-03 DIAGNOSIS — M51.361 DEGENERATION OF INTERVERTEBRAL DISC OF LUMBAR REGION WITH LOWER EXTREMITY PAIN: ICD-10-CM

## 2025-07-03 DIAGNOSIS — G89.4 CHRONIC PAIN SYNDROME: ICD-10-CM

## 2025-07-03 DIAGNOSIS — M43.17 SPONDYLOLISTHESIS OF LUMBOSACRAL REGION: ICD-10-CM

## 2025-07-03 RX ORDER — LIDOCAINE 36 MG/1
PATCH TOPICAL
Qty: 4 PATCH | Refills: 0 | Status: SHIPPED | OUTPATIENT
Start: 2025-07-03

## 2025-07-03 RX ORDER — HYDROCODONE BITARTRATE AND ACETAMINOPHEN 7.5; 325 MG/1; MG/1
1 TABLET ORAL EVERY 12 HOURS PRN
Qty: 60 TABLET | Refills: 0 | Status: SHIPPED | OUTPATIENT
Start: 2025-07-03 | End: 2025-08-02

## 2025-07-03 NOTE — PROGRESS NOTES
Heidi Rogel  1940  [id number]    HISTORY OF PRESENT ILLNESS: Ms. Rogel is a 85 y.o. female returns for a follow up visit for pain management  She has a diagnosis of   1. Chronic pain syndrome    2. Spasm of muscle, back    3. Degeneration of intervertebral disc of lumbar region with lower extremity pain    4. Spondylolisthesis of lumbosacral region    5. Facet hypertrophy of lumbar region    6. Chronic midline low back pain with sciatica, sciatica laterality unspecified    7. Failed back syndrome, lumbar    8. Thoracic spondylosis    9. Primary osteoarthritis of both hips    10. Arthritis of shoulder, unspecified laterality    11. Class 1 drug-induced obesity with body mass index (BMI) of 33.0 to 33.9 in adult, unspecified whether serious comorbidity present      As per Information Obtained from the PADT (Patient Assessment and Documentation Tool)    She complains of pain in the Lower back She rates the pain 6/10 and describes it as Stabbing. Current treatment regimen has helped relieve about 60% of the pain.  She denies any side effects from the current pain regimen. Patient reports that since the last follow up visit the physical functioning is better, family/social relationships are better, mood is better sleep patterns are unchanged, and that the overall functioning is better.  Patient denies misusing/abusing her narcotic pain medications or using any illegal drugs.      Upon obtaining medical history from Ms. Rogel states that pain is manageable on current pain therapy. Takes the pain medications as prescribed. Reports having experienced pain in the lower back in the morning, unsure what caused the pain. She was not sure she would make the appointment. Lidocaine patch with an extra Tylenol helped improve the pain some. This has only been going on for 2 days. No recent trauma. Mood/anxiety is stable. Sleep is fair with an average of 5-6 hours. Denies to having issues of constipation.

## 2025-07-07 ENCOUNTER — OFFICE VISIT (OUTPATIENT)
Dept: CARDIOLOGY CLINIC | Age: 85
End: 2025-07-07
Payer: MEDICARE

## 2025-07-07 VITALS
HEART RATE: 69 BPM | SYSTOLIC BLOOD PRESSURE: 160 MMHG | DIASTOLIC BLOOD PRESSURE: 70 MMHG | BODY MASS INDEX: 34.67 KG/M2 | WEIGHT: 202 LBS

## 2025-07-07 DIAGNOSIS — I25.10 CORONARY ARTERY DISEASE INVOLVING NATIVE CORONARY ARTERY OF NATIVE HEART WITHOUT ANGINA PECTORIS: ICD-10-CM

## 2025-07-07 DIAGNOSIS — I50.32 CHRONIC DIASTOLIC CHF (CONGESTIVE HEART FAILURE) (HCC): ICD-10-CM

## 2025-07-07 DIAGNOSIS — E78.2 HYPERLIPIDEMIA, MIXED: ICD-10-CM

## 2025-07-07 DIAGNOSIS — I10 BENIGN ESSENTIAL HTN: Primary | ICD-10-CM

## 2025-07-07 PROCEDURE — 3078F DIAST BP <80 MM HG: CPT | Performed by: INTERNAL MEDICINE

## 2025-07-07 PROCEDURE — 1036F TOBACCO NON-USER: CPT | Performed by: INTERNAL MEDICINE

## 2025-07-07 PROCEDURE — G8417 CALC BMI ABV UP PARAM F/U: HCPCS | Performed by: INTERNAL MEDICINE

## 2025-07-07 PROCEDURE — 3077F SYST BP >= 140 MM HG: CPT | Performed by: INTERNAL MEDICINE

## 2025-07-07 PROCEDURE — 1123F ACP DISCUSS/DSCN MKR DOCD: CPT | Performed by: INTERNAL MEDICINE

## 2025-07-07 PROCEDURE — 93000 ELECTROCARDIOGRAM COMPLETE: CPT | Performed by: INTERNAL MEDICINE

## 2025-07-07 PROCEDURE — G8428 CUR MEDS NOT DOCUMENT: HCPCS | Performed by: INTERNAL MEDICINE

## 2025-07-07 PROCEDURE — G8399 PT W/DXA RESULTS DOCUMENT: HCPCS | Performed by: INTERNAL MEDICINE

## 2025-07-07 PROCEDURE — G2211 COMPLEX E/M VISIT ADD ON: HCPCS | Performed by: INTERNAL MEDICINE

## 2025-07-07 PROCEDURE — 99214 OFFICE O/P EST MOD 30 MIN: CPT | Performed by: INTERNAL MEDICINE

## 2025-07-07 PROCEDURE — 1090F PRES/ABSN URINE INCON ASSESS: CPT | Performed by: INTERNAL MEDICINE

## 2025-07-07 RX ORDER — INCLISIRAN 284 MG/1.5ML
284 INJECTION, SOLUTION SUBCUTANEOUS ONCE
Qty: 1.5 ML | Refills: 0
Start: 2025-07-07 | End: 2025-07-07

## 2025-07-07 NOTE — ASSESSMENT & PLAN NOTE
In the past multiple side effects with the statins, injectables.  Patient would like to continue to remain off medications.

## 2025-07-11 RX ORDER — IRBESARTAN 300 MG/1
300 TABLET ORAL DAILY
Qty: 90 TABLET | Refills: 3 | Status: SHIPPED | OUTPATIENT
Start: 2025-07-11

## 2025-08-06 ENCOUNTER — OFFICE VISIT (OUTPATIENT)
Dept: PAIN MANAGEMENT | Age: 85
End: 2025-08-06
Payer: MEDICARE

## 2025-08-06 VITALS
HEART RATE: 74 BPM | WEIGHT: 202 LBS | BODY MASS INDEX: 34.67 KG/M2 | DIASTOLIC BLOOD PRESSURE: 90 MMHG | OXYGEN SATURATION: 97 % | SYSTOLIC BLOOD PRESSURE: 150 MMHG

## 2025-08-06 DIAGNOSIS — E66.811 CLASS 1 DRUG-INDUCED OBESITY WITH BODY MASS INDEX (BMI) OF 33.0 TO 33.9 IN ADULT, UNSPECIFIED WHETHER SERIOUS COMORBIDITY PRESENT: ICD-10-CM

## 2025-08-06 DIAGNOSIS — M51.361 DEGENERATION OF INTERVERTEBRAL DISC OF LUMBAR REGION WITH LOWER EXTREMITY PAIN: ICD-10-CM

## 2025-08-06 DIAGNOSIS — G89.4 CHRONIC PAIN SYNDROME: ICD-10-CM

## 2025-08-06 DIAGNOSIS — M43.17 SPONDYLOLISTHESIS OF LUMBOSACRAL REGION: ICD-10-CM

## 2025-08-06 DIAGNOSIS — M16.0 PRIMARY OSTEOARTHRITIS OF BOTH HIPS: ICD-10-CM

## 2025-08-06 DIAGNOSIS — M62.830 SPASM OF MUSCLE, BACK: ICD-10-CM

## 2025-08-06 DIAGNOSIS — M96.1 FAILED BACK SYNDROME, LUMBAR: ICD-10-CM

## 2025-08-06 DIAGNOSIS — G89.29 CHRONIC MIDLINE LOW BACK PAIN WITH SCIATICA, SCIATICA LATERALITY UNSPECIFIED: ICD-10-CM

## 2025-08-06 DIAGNOSIS — M47.814 THORACIC SPONDYLOSIS: ICD-10-CM

## 2025-08-06 DIAGNOSIS — E66.1 CLASS 1 DRUG-INDUCED OBESITY WITH BODY MASS INDEX (BMI) OF 33.0 TO 33.9 IN ADULT, UNSPECIFIED WHETHER SERIOUS COMORBIDITY PRESENT: ICD-10-CM

## 2025-08-06 DIAGNOSIS — M47.816 FACET HYPERTROPHY OF LUMBAR REGION: ICD-10-CM

## 2025-08-06 DIAGNOSIS — M19.019 ARTHRITIS OF SHOULDER, UNSPECIFIED LATERALITY: ICD-10-CM

## 2025-08-06 DIAGNOSIS — M54.40 CHRONIC MIDLINE LOW BACK PAIN WITH SCIATICA, SCIATICA LATERALITY UNSPECIFIED: ICD-10-CM

## 2025-08-06 PROCEDURE — 1090F PRES/ABSN URINE INCON ASSESS: CPT | Performed by: NURSE PRACTITIONER

## 2025-08-06 PROCEDURE — 1036F TOBACCO NON-USER: CPT | Performed by: NURSE PRACTITIONER

## 2025-08-06 PROCEDURE — G8399 PT W/DXA RESULTS DOCUMENT: HCPCS | Performed by: NURSE PRACTITIONER

## 2025-08-06 PROCEDURE — G8417 CALC BMI ABV UP PARAM F/U: HCPCS | Performed by: NURSE PRACTITIONER

## 2025-08-06 PROCEDURE — 3077F SYST BP >= 140 MM HG: CPT | Performed by: NURSE PRACTITIONER

## 2025-08-06 PROCEDURE — 3079F DIAST BP 80-89 MM HG: CPT | Performed by: NURSE PRACTITIONER

## 2025-08-06 PROCEDURE — 99214 OFFICE O/P EST MOD 30 MIN: CPT | Performed by: NURSE PRACTITIONER

## 2025-08-06 PROCEDURE — 1159F MED LIST DOCD IN RCRD: CPT | Performed by: NURSE PRACTITIONER

## 2025-08-06 PROCEDURE — 1123F ACP DISCUSS/DSCN MKR DOCD: CPT | Performed by: NURSE PRACTITIONER

## 2025-08-06 PROCEDURE — G8427 DOCREV CUR MEDS BY ELIG CLIN: HCPCS | Performed by: NURSE PRACTITIONER

## 2025-08-06 RX ORDER — LIDOCAINE 36 MG/1
PATCH TOPICAL
Qty: 4 PATCH | Refills: 0 | Status: SHIPPED | OUTPATIENT
Start: 2025-08-06

## 2025-08-06 RX ORDER — HYDROCODONE BITARTRATE AND ACETAMINOPHEN 7.5; 325 MG/1; MG/1
1 TABLET ORAL EVERY 12 HOURS PRN
Qty: 60 TABLET | Refills: 0 | Status: SHIPPED | OUTPATIENT
Start: 2025-08-06 | End: 2025-09-05

## 2025-09-05 RX ORDER — HYDROCHLOROTHIAZIDE 12.5 MG/1
12.5 CAPSULE ORAL DAILY
Qty: 90 CAPSULE | Refills: 3 | Status: SHIPPED | OUTPATIENT
Start: 2025-09-05

## (undated) DEVICE — STERILE POLYISOPRENE POWDER-FREE SURGICAL GLOVES: Brand: PROTEXIS

## (undated) DEVICE — SUTURE VCRL SZ 2-0 L18IN ABSRB UD CT-1 L36MM 1/2 CIR J839D

## (undated) DEVICE — PORT VLV 2 W NDL FREE SMRTSITE

## (undated) DEVICE — LAMINECTOMY PK

## (undated) DEVICE — CHLORAPREP 26ML ORANGE

## (undated) DEVICE — 3M™ STERI-STRIP™ REINFORCED ADHESIVE SKIN CLOSURES, R1547, 1/2 IN X 4 IN (12 MM X 100 MM), 6 STRIPS/ENVELOPE: Brand: 3M™ STERI-STRIP™

## (undated) DEVICE — SYRINGE MED 10ML LUERLOCK TIP W/O SFTY DISP

## (undated) DEVICE — SET ADMIN PRIMING 7ML L30IN 7.35LB 20 GTT 2ND RLER CLMP

## (undated) DEVICE — SUTURE VCRL SZ 0 L18IN ABSRB UD L36MM CT-1 1/2 CIR J840D

## (undated) DEVICE — 3M™ TEGADERM™ TRANSPARENT FILM DRESSING FRAME STYLE, 1628, 6 IN X 8 IN (15 CM X 20 CM), 10/CT 8CT/CASE: Brand: 3M™ TEGADERM™

## (undated) DEVICE — NEEDLE EPI 14GA L15CM FOR SPNL CRD STIM

## (undated) DEVICE — PROBE NERVE STIM MONOPOLAR 10096] MEDTRONIC USA INC]

## (undated) DEVICE — STANDARD HYPODERMIC NEEDLE,POLYPROPYLENE HUB: Brand: MONOJECT

## (undated) DEVICE — UNIVERSAL BLOCK TRAY: Brand: AVANOS*

## (undated) DEVICE — SKIN AFFIX SURG ADHESIVE 72/CS 0.55ML: Brand: MEDLINE

## (undated) DEVICE — MEDIA CONTRAST RX ISOVUE-300 61% 30ML VIALS

## (undated) DEVICE — GLOVE SURG SZ 8 L12IN FNGR THK13MIL BRN LTX SYN POLYMER W

## (undated) DEVICE — SYRINGE MED 3ML CLR PLAS STD N CTRL LUERLOCK TIP DISP

## (undated) DEVICE — SENZA®  CHARGER KIT: Brand: SENZA®

## (undated) DEVICE — Device: Brand: JELCO

## (undated) DEVICE — CATHETER IV 14GA L5.25IN PERIPH ORNG FEP POLYMER 3 BVL

## (undated) DEVICE — INTENDED FOR TISSUE SEPARATION, AND OTHER PROCEDURES THAT REQUIRE A SHARP SURGICAL BLADE TO PUNCTURE OR CUT.: Brand: BARD-PARKER ® DISPOSABLE SCALPELS

## (undated) DEVICE — STERILE LATEX POWDER-FREE SURGICAL GLOVESWITH NITRILE COATING: Brand: PROTEXIS

## (undated) DEVICE — PEN: MARKING STD 100/CS: Brand: MEDICAL ACTION INDUSTRIES

## (undated) DEVICE — 3M™ TEGADERM™ TRANSPARENT FILM DRESSING FRAME STYLE, 1626W, 4 IN X 4-3/4 IN (10 CM X 12 CM), 50/CT 4CT/CASE: Brand: 3M™ TEGADERM™

## (undated) DEVICE — IV START KIT: Brand: MEDLINE INDUSTRIES, INC.

## (undated) DEVICE — IPG TEMPLATE KIT

## (undated) DEVICE — TURNOVER KIT RM INF CTRL TECH

## (undated) DEVICE — GARMENT,MEDLINE,DVT,INT,CALF,MED, GEN2: Brand: MEDLINE

## (undated) DEVICE — SUTURE VCRL SZ 3-0 L18IN ABSRB UD L26MM SH 1/2 CIR J864D

## (undated) DEVICE — Z CONVERTED USE 2275871 SPONGE GZ W4XL4IN WHT 8 PLY CURITY

## (undated) DEVICE — SYRINGE IRRIG 60ML SFT PLIABLE BLB EZ TO GRP 1 HND USE W/

## (undated) DEVICE — NEEDLE SPNL 22GA L3.5IN BLK HUB S STL REG WALL FIT STYL

## (undated) DEVICE — SENZA®  PATIENT TRIAL KIT: Brand: SENZA®

## (undated) DEVICE — TOWEL,OR,DSP,ST,BLUE,STD,4/PK,20PK/CS: Brand: MEDLINE

## (undated) DEVICE — APPLICATOR MEDICATED 26 CC SOLUTION HI LT ORNG CHLORAPREP

## (undated) DEVICE — PATIENT REMOTE KIT: Brand: SENZA®

## (undated) DEVICE — SURE SET-DOUBLE BASIN-LF: Brand: MEDLINE INDUSTRIES, INC.

## (undated) DEVICE — CABLE BPLR L12FT FLYING LD DISPOSABLE

## (undated) DEVICE — SET GRAV VENT NVENT CK VLV 3 NDL FREE PRT 10 GTT

## (undated) DEVICE — KIT NDL INSRT 14GA L4IN CRV TIP

## (undated) DEVICE — KIT POS PT CARE KT W/ GENTLE TCH WILSON +

## (undated) DEVICE — HYPODERMIC SAFETY NEEDLE: Brand: MAGELLAN

## (undated) DEVICE — PLATE ES AD W 9FT CRD 2

## (undated) DEVICE — KIT NEUROSTIM PASS ELEV ACC FOR INTERSTIM SPNL CRD STIM

## (undated) DEVICE — NEEDLE SPNL 22GA L3.5IN BLK HUB S STL REG WALL FIT STYL W/

## (undated) DEVICE — TRAY ANES SUPP NO DRUG CUST

## (undated) DEVICE — NEEDLE SPNL 22GA L5IN BLK HUB S STL W/ QNCKE PNT W/OUT

## (undated) DEVICE — SHEET, ORTHO, SPLIT, STERILE: Brand: MEDLINE

## (undated) DEVICE — GAUZE,SPONGE,4"X4",8PLY,STRL,LF,10/TRAY: Brand: MEDLINE

## (undated) DEVICE — SHEET,DRAPE,53X77,STERILE: Brand: MEDLINE

## (undated) DEVICE — 6 ML SYRINGE LUER-LOCK TIP: Brand: MONOJECT

## (undated) DEVICE — 3M™ WARMING BLANKET, UPPER BODY, 10 PER CASE, 42268: Brand: BAIR HUGGER™

## (undated) DEVICE — DRAPE ADOLESCENT  LAPAROTOMY

## (undated) DEVICE — SYRINGE MED 30ML STD CLR PLAS LUERLOCK TIP N CTRL DISP

## (undated) DEVICE — COVER LT HNDL CAM BLU DISP W/ SURG CTRL

## (undated) DEVICE — SOLUTION IV 1000ML 0.9% SOD CHL

## (undated) DEVICE — Device